# Patient Record
Sex: FEMALE | Race: WHITE | NOT HISPANIC OR LATINO | Employment: OTHER | URBAN - NONMETROPOLITAN AREA
[De-identification: names, ages, dates, MRNs, and addresses within clinical notes are randomized per-mention and may not be internally consistent; named-entity substitution may affect disease eponyms.]

---

## 2017-05-19 RX ORDER — WARFARIN SODIUM 5 MG/1
5 TABLET ORAL
COMMUNITY
End: 2019-02-07

## 2017-05-19 RX ORDER — DIGOXIN 125 MCG
125 TABLET ORAL
COMMUNITY

## 2017-05-19 RX ORDER — POTASSIUM CHLORIDE 1.5 G/1.77G
20 POWDER, FOR SOLUTION ORAL DAILY
COMMUNITY

## 2017-05-19 RX ORDER — METOPROLOL TARTRATE 50 MG/1
50 TABLET, FILM COATED ORAL 2 TIMES DAILY
COMMUNITY
End: 2017-06-26

## 2017-05-19 RX ORDER — FUROSEMIDE 40 MG/1
40 TABLET ORAL DAILY
COMMUNITY

## 2017-05-19 RX ORDER — ESCITALOPRAM OXALATE 10 MG/1
5 TABLET ORAL DAILY
COMMUNITY

## 2017-05-19 RX ORDER — SIMVASTATIN 20 MG
20 TABLET ORAL NIGHTLY
COMMUNITY
End: 2017-06-26

## 2017-05-19 RX ORDER — PREDNISONE 10 MG/1
10 TABLET ORAL DAILY
COMMUNITY
End: 2017-06-26

## 2017-05-19 RX ORDER — OMEPRAZOLE 40 MG/1
40 CAPSULE, DELAYED RELEASE ORAL DAILY
COMMUNITY
End: 2017-06-26

## 2017-05-19 RX ORDER — LEVOTHYROXINE SODIUM 0.05 MG/1
75 TABLET ORAL DAILY
COMMUNITY

## 2017-06-07 ENCOUNTER — HOSPITAL ENCOUNTER (EMERGENCY)
Facility: HOSPITAL | Age: 82
Discharge: HOME | End: 2017-06-07
Attending: EMERGENCY MEDICINE | Admitting: EMERGENCY MEDICINE

## 2017-06-07 ENCOUNTER — APPOINTMENT (OUTPATIENT)
Dept: GENERAL RADIOLOGY | Facility: HOSPITAL | Age: 82
End: 2017-06-07

## 2017-06-07 VITALS
HEART RATE: 71 BPM | WEIGHT: 130 LBS | RESPIRATION RATE: 22 BRPM | SYSTOLIC BLOOD PRESSURE: 136 MMHG | OXYGEN SATURATION: 96 % | TEMPERATURE: 97.4 F | DIASTOLIC BLOOD PRESSURE: 92 MMHG | BODY MASS INDEX: 22.2 KG/M2 | HEIGHT: 64 IN

## 2017-06-07 DIAGNOSIS — N17.9 ACUTE KIDNEY INJURY (NONTRAUMATIC) (HCC): ICD-10-CM

## 2017-06-07 DIAGNOSIS — R06.02 SHORTNESS OF BREATH: ICD-10-CM

## 2017-06-07 DIAGNOSIS — R42 DIZZINESS: ICD-10-CM

## 2017-06-07 DIAGNOSIS — E86.9 VOLUME DEPLETION: Primary | ICD-10-CM

## 2017-06-07 LAB
ALBUMIN SERPL-MCNC: 4.3 G/DL (ref 3.5–5)
ALBUMIN/GLOB SERPL: 1.3 G/DL (ref 1.1–2.5)
ALP SERPL-CCNC: 67 U/L (ref 24–120)
ALT SERPL W P-5'-P-CCNC: 25 U/L (ref 0–54)
ANION GAP SERPL CALCULATED.3IONS-SCNC: 12 MMOL/L (ref 4–13)
AST SERPL-CCNC: 40 U/L (ref 7–45)
BASOPHILS # BLD AUTO: 0.01 10*3/MM3 (ref 0–0.2)
BASOPHILS NFR BLD AUTO: 0.2 % (ref 0–2)
BILIRUB SERPL-MCNC: 0.7 MG/DL (ref 0.1–1)
BILIRUB UR QL STRIP: NEGATIVE
BUN BLD-MCNC: 21 MG/DL (ref 5–21)
BUN/CREAT SERPL: 14.2 (ref 7–25)
CALCIUM SPEC-SCNC: 10 MG/DL (ref 8.4–10.4)
CHLORIDE SERPL-SCNC: 96 MMOL/L (ref 98–110)
CLARITY UR: CLEAR
CO2 SERPL-SCNC: 27 MMOL/L (ref 24–31)
COLOR UR: YELLOW
CREAT BLD-MCNC: 1.48 MG/DL (ref 0.5–1.4)
D-LACTATE SERPL-SCNC: 1.5 MMOL/L (ref 0.5–2)
DEPRECATED RDW RBC AUTO: 46.2 FL (ref 40–54)
DIGOXIN SERPL-MCNC: 1.4 NG/ML (ref 0.8–2)
EOSINOPHIL # BLD AUTO: 0.01 10*3/MM3 (ref 0–0.7)
EOSINOPHIL NFR BLD AUTO: 0.2 % (ref 0–4)
ERYTHROCYTE [DISTWIDTH] IN BLOOD BY AUTOMATED COUNT: 14.3 % (ref 12–15)
GFR SERPL CREATININE-BSD FRML MDRD: 33 ML/MIN/1.73
GLOBULIN UR ELPH-MCNC: 3.2 GM/DL
GLUCOSE BLD-MCNC: 120 MG/DL (ref 70–100)
GLUCOSE UR STRIP-MCNC: NEGATIVE MG/DL
HCT VFR BLD AUTO: 45.5 % (ref 37–47)
HGB BLD-MCNC: 15.3 G/DL (ref 12–16)
HGB UR QL STRIP.AUTO: NEGATIVE
IMM GRANULOCYTES # BLD: 0.03 10*3/MM3 (ref 0–0.03)
IMM GRANULOCYTES NFR BLD: 0.5 % (ref 0–5)
INR PPP: 1.9 (ref 0.91–1.09)
KETONES UR QL STRIP: NEGATIVE
LEUKOCYTE ESTERASE UR QL STRIP.AUTO: NEGATIVE
LYMPHOCYTES # BLD AUTO: 0.86 10*3/MM3 (ref 0.72–4.86)
LYMPHOCYTES NFR BLD AUTO: 15.2 % (ref 15–45)
MCH RBC QN AUTO: 29.6 PG (ref 28–32)
MCHC RBC AUTO-ENTMCNC: 33.6 G/DL (ref 33–36)
MCV RBC AUTO: 88 FL (ref 82–98)
MONOCYTES # BLD AUTO: 0.88 10*3/MM3 (ref 0.19–1.3)
MONOCYTES NFR BLD AUTO: 15.6 % (ref 4–12)
NEUTROPHILS # BLD AUTO: 3.85 10*3/MM3 (ref 1.87–8.4)
NEUTROPHILS NFR BLD AUTO: 68.3 % (ref 39–78)
NITRITE UR QL STRIP: NEGATIVE
NT-PROBNP SERPL-MCNC: 2280 PG/ML (ref 0–1800)
PH UR STRIP.AUTO: <=5 [PH] (ref 5–8)
PLATELET # BLD AUTO: 174 10*3/MM3 (ref 130–400)
PMV BLD AUTO: 10.6 FL (ref 6–12)
POTASSIUM BLD-SCNC: 4.6 MMOL/L (ref 3.5–5.3)
PROT SERPL-MCNC: 7.5 G/DL (ref 6.3–8.7)
PROT UR QL STRIP: NEGATIVE
PROTHROMBIN TIME: 22.5 SECONDS (ref 11.9–14.6)
RBC # BLD AUTO: 5.17 10*6/MM3 (ref 4.2–5.4)
SODIUM BLD-SCNC: 135 MMOL/L (ref 135–145)
SP GR UR STRIP: 1.01 (ref 1–1.03)
TROPONIN I SERPL-MCNC: 0.03 NG/ML (ref 0–0.07)
UROBILINOGEN UR QL STRIP: NORMAL
WBC NRBC COR # BLD: 5.64 10*3/MM3 (ref 4.8–10.8)

## 2017-06-07 PROCEDURE — 80053 COMPREHEN METABOLIC PANEL: CPT | Performed by: EMERGENCY MEDICINE

## 2017-06-07 PROCEDURE — 93010 ELECTROCARDIOGRAM REPORT: CPT | Performed by: INTERNAL MEDICINE

## 2017-06-07 PROCEDURE — 83605 ASSAY OF LACTIC ACID: CPT | Performed by: EMERGENCY MEDICINE

## 2017-06-07 PROCEDURE — 84484 ASSAY OF TROPONIN QUANT: CPT

## 2017-06-07 PROCEDURE — 93005 ELECTROCARDIOGRAM TRACING: CPT

## 2017-06-07 PROCEDURE — 83880 ASSAY OF NATRIURETIC PEPTIDE: CPT | Performed by: EMERGENCY MEDICINE

## 2017-06-07 PROCEDURE — 85025 COMPLETE CBC W/AUTO DIFF WBC: CPT | Performed by: EMERGENCY MEDICINE

## 2017-06-07 PROCEDURE — 87040 BLOOD CULTURE FOR BACTERIA: CPT | Performed by: EMERGENCY MEDICINE

## 2017-06-07 PROCEDURE — 36415 COLL VENOUS BLD VENIPUNCTURE: CPT

## 2017-06-07 PROCEDURE — 71010 HC CHEST PA OR AP: CPT

## 2017-06-07 PROCEDURE — 99284 EMERGENCY DEPT VISIT MOD MDM: CPT

## 2017-06-07 PROCEDURE — 85610 PROTHROMBIN TIME: CPT | Performed by: EMERGENCY MEDICINE

## 2017-06-07 PROCEDURE — 80162 ASSAY OF DIGOXIN TOTAL: CPT | Performed by: EMERGENCY MEDICINE

## 2017-06-07 PROCEDURE — 81003 URINALYSIS AUTO W/O SCOPE: CPT | Performed by: EMERGENCY MEDICINE

## 2017-06-07 RX ORDER — METHYLPREDNISOLONE 4 MG/1
TABLET ORAL
Qty: 21 TABLET | Refills: 0 | Status: SHIPPED | OUTPATIENT
Start: 2017-06-07 | End: 2017-06-26

## 2017-06-07 RX ORDER — ALBUTEROL SULFATE 90 UG/1
2 AEROSOL, METERED RESPIRATORY (INHALATION) EVERY 4 HOURS PRN
Qty: 1 INHALER | Refills: 0 | Status: SHIPPED | OUTPATIENT
Start: 2017-06-07 | End: 2017-06-26

## 2017-06-07 RX ORDER — AZITHROMYCIN 250 MG/1
250 TABLET, FILM COATED ORAL DAILY
Qty: 6 TABLET | Refills: 0 | Status: SHIPPED | OUTPATIENT
Start: 2017-06-07 | End: 2017-06-26 | Stop reason: ALTCHOICE

## 2017-06-07 NOTE — ED PROVIDER NOTES
"Subjective   HPI Comments: Patient complains of \"fatigue\" since yesterday.  She seems to be getting worse with a generalized weakness and some shortness of breath.  She just generally feels tired and no energy and weak.  She also has had some dizziness whenever she moves around very much and family wanted to get her checked out.    Patient is a 86 y.o. female presenting with weakness.   History provided by:  Patient and relative   used: No    Weakness - Generalized   Severity:  Moderate  Onset quality:  Gradual  Duration:  2 days  Timing:  Constant  Progression:  Worsening  Chronicity:  New  Context: not alcohol use, not allergies, not change in medication, not decreased sleep, not dehydration, not drug use, not increased activity, not pinched nerve, not recent infection, not stress and not urinary tract infection    Relieved by:  Nothing  Worsened by:  Nothing  Ineffective treatments:  None tried  Associated symptoms: difficulty walking, dizziness and shortness of breath    Associated symptoms: no abdominal pain, no anorexia, no aphasia, no arthralgias, no ataxia, no chest pain, no cough, no diarrhea, no drooling, no dysphagia, no dysuria, no numbness in extremities, no falls, no fever, no foul-smelling urine, no frequency, no headaches, no hematochezia, no lethargy, no loss of consciousness, no melena, no myalgias, no nausea, no near-syncope, no seizures, no sensory-motor deficit, no stroke symptoms, no syncope, no urgency, no vision change and no vomiting    Risk factors: congestive heart failure    Risk factors: no anemia, no coronary artery disease, no diabetes, no excessive menstruation, no family hx of stroke, no heart disease, no neurologic disease, no new medications and no recent stressors        Review of Systems   Constitutional: Negative.  Negative for fever.   HENT: Negative.  Negative for drooling.    Respiratory: Positive for shortness of breath. Negative for cough.  "   Cardiovascular: Negative.  Negative for chest pain, syncope and near-syncope.   Gastrointestinal: Negative.  Negative for abdominal pain, anorexia, diarrhea, dysphagia, hematochezia, melena, nausea and vomiting.   Genitourinary: Negative.  Negative for dysuria, frequency and urgency.   Musculoskeletal: Negative.  Negative for arthralgias, falls and myalgias.   Neurological: Positive for dizziness. Negative for seizures, loss of consciousness and headaches.   Hematological: Negative.    Psychiatric/Behavioral: Negative.    All other systems reviewed and are negative.      Past Medical History:   Diagnosis Date   • Atherosclerosis of native coronary artery of native heart without angina pectoris    • Atrial fibrillation by electrocardiogram     neg stress 4/10   • Atrial fibrillation by electrocardiogram    • Fatigue    • Hyperlipidemia, mixed    • Hypertension, benign    • Mitral valve disorder     moderate MR per 8/12 echo - worsening c/w 2010 - prob component of her dyspnea   • Sick sinus syndrome    • Status post placement of cardiac pacemaker        No Known Allergies    Past Surgical History:   Procedure Laterality Date   • CORONARY ANGIOPLASTY  09/2009    stent LAD       Family History   Problem Relation Age of Onset   • Coronary artery disease Mother        Social History     Social History   • Marital status:      Spouse name: N/A   • Number of children: N/A   • Years of education: N/A     Social History Main Topics   • Smoking status: Former Smoker     Quit date: 1992   • Smokeless tobacco: None   • Alcohol use No   • Drug use: None   • Sexual activity: Not Asked     Other Topics Concern   • None     Social History Narrative       Prior to Admission medications    Medication Sig Start Date End Date Taking? Authorizing Provider   Calcium Carbonate (CALCIUM 600 PO) Take  by mouth.    Historical Provider, MD   digoxin (LANOXIN) 125 MCG tablet Take 125 mcg by mouth Daily.    Historical Provider, MD    escitalopram (LEXAPRO) 10 MG tablet Take 10 mg by mouth Daily.    Historical Provider, MD   furosemide (LASIX) 40 MG tablet Take 40 mg by mouth 2 (Two) Times a Day.    Historical Provider, MD   levothyroxine (SYNTHROID, LEVOTHROID) 50 MCG tablet Take 50 mcg by mouth Daily.    Historical Provider, MD   metoprolol tartrate (LOPRESSOR) 50 MG tablet Take 50 mg by mouth 2 (Two) Times a Day.    Historical Provider, MD   omeprazole (priLOSEC) 40 MG capsule Take 40 mg by mouth Daily.    Historical Provider, MD   potassium chloride (KLOR-CON) 20 MEQ packet Take 20 mEq by mouth 2 (Two) Times a Day.    Historical Provider, MD   predniSONE (DELTASONE) 10 MG tablet Take 10 mg by mouth Daily.    Historical Provider, MD   simvastatin (ZOCOR) 20 MG tablet Take 20 mg by mouth Every Night.    Historical Provider, MD   warfarin (COUMADIN) 5 MG tablet Take 5 mg by mouth Daily.    Historical Provider, MD       Medications - No data to display    Vitals:    06/07/17 2120   BP: 136/92   Pulse: 71   Resp: 22   Temp:    SpO2: 96%         Objective   Physical Exam   Constitutional: She is oriented to person, place, and time. She appears well-developed and well-nourished.   HENT:   Head: Normocephalic and atraumatic.   Mouth/Throat: Oropharynx is clear and moist.   Eyes: EOM are normal. Pupils are equal, round, and reactive to light.   Neck: Normal range of motion. Neck supple.   Cardiovascular: Normal rate and regular rhythm.    Pulmonary/Chest: She has no wheezes.   Patient seems mildly tachypneic but nonlabored.   Abdominal: Soft. Bowel sounds are normal.   Musculoskeletal: Normal range of motion. She exhibits edema.   Patient has 1+ edema in her lower legs.   Neurological: She is alert and oriented to person, place, and time.   Skin: Skin is warm and dry.   Psychiatric: She has a normal mood and affect. Her behavior is normal.   Nursing note and vitals reviewed.      Procedures         Lab Results (last 24 hours)     Procedure Component  Value Units Date/Time    CBC & Differential [[id number]] Collected:  06/07/17 1811    Specimen:  Blood Updated:  06/07/17 1823    Narrative:       The following orders were created for panel order CBC & Differential.  Procedure                               Abnormality         Status                     ---------                               -----------         ------                     CBC Auto Differential[[id number]]        Abnormal            Final result                 Please view results for these tests on the individual orders.    Protime-INR [[id number]]  (Abnormal) Collected:  06/07/17 1811    Specimen:  Blood Updated:  06/07/17 1839     Protime 22.5 (H) Seconds      INR 1.90 (H)    BNP [[id number]]  (Abnormal) Collected:  06/07/17 1811    Specimen:  Blood Updated:  06/07/17 1840     proBNP 2280.0 (H) pg/mL     Digoxin Level [[id number]]  (Normal) Collected:  06/07/17 1811    Specimen:  Blood Updated:  06/07/17 1834     Digoxin 1.40 ng/mL     CBC Auto Differential [[id number]]  (Abnormal) Collected:  06/07/17 1811    Specimen:  Blood Updated:  06/07/17 1823     WBC 5.64 10*3/mm3      RBC 5.17 10*6/mm3      Hemoglobin 15.3 g/dL      Hematocrit 45.5 %      MCV 88.0 fL      MCH 29.6 pg      MCHC 33.6 g/dL      RDW 14.3 %      RDW-SD 46.2 fl      MPV 10.6 fL      Platelets 174 10*3/mm3      Neutrophil % 68.3 %      Lymphocyte % 15.2 %      Monocyte % 15.6 (H) %      Eosinophil % 0.2 %      Basophil % 0.2 %      Immature Grans % 0.5 %      Neutrophils, Absolute 3.85 10*3/mm3      Lymphocytes, Absolute 0.86 10*3/mm3      Monocytes, Absolute 0.88 10*3/mm3      Eosinophils, Absolute 0.01 10*3/mm3      Basophils, Absolute 0.01 10*3/mm3      Immature Grans, Absolute 0.03 10*3/mm3     Blood Culture [[id number]] Collected:  06/07/17 1815    Specimen:  Blood from Arm, Left Updated:  06/07/17 1834    Lactic Acid, Plasma [[id number]]  (Normal) Collected:  06/07/17 1815    Specimen:  Blood from Arm, Left Updated:  06/07/17 1846      Lactate 1.5 mmol/L     Blood Culture [[id number]] Collected:  06/07/17 1820    Specimen:  Blood from Arm, Left Updated:  06/07/17 1833    POC Troponin, Rapid [[id number]]  (Normal) Collected:  06/07/17 1823    Specimen:  Blood Updated:  06/07/17 1835     Troponin I 0.03 ng/mL       Serial Number: [id number]    : [id number]       Comprehensive Metabolic Panel [[id number]]  (Abnormal) Collected:  06/07/17 1952    Specimen:  Blood from Arm, Left Updated:  06/07/17 2010     Glucose 120 (H) mg/dL      BUN 21 mg/dL      Creatinine 1.48 (H) mg/dL      Sodium 135 mmol/L      Potassium 4.6 mmol/L      Chloride 96 (L) mmol/L      CO2 27.0 mmol/L      Calcium 10.0 mg/dL      Total Protein 7.5 g/dL      Albumin 4.30 g/dL      ALT (SGPT) 25 U/L      AST (SGOT) 40 U/L      Alkaline Phosphatase 67 U/L      Total Bilirubin 0.7 mg/dL      eGFR Non African Amer 33 (L) mL/min/1.73      Globulin 3.2 gm/dL      A/G Ratio 1.3 g/dL      BUN/Creatinine Ratio 14.2     Anion Gap 12.0 mmol/L     Narrative:       The MDRD GFR formula is only valid for adults with stable renal function between ages 18 and 70.    Urinalysis With / Culture If Indicated [[id number]]  (Normal) Collected:  06/07/17 2034    Specimen:  Urine from Urine, Catheter Updated:  06/07/17 2043     Color, UA Yellow     Appearance, UA Clear     pH, UA <=5.0     Specific Gravity, UA 1.011     Glucose, UA Negative     Ketones, UA Negative     Bilirubin, UA Negative     Blood, UA Negative     Protein, UA Negative     Leuk Esterase, UA Negative     Nitrite, UA Negative     Urobilinogen, UA 0.2 E.U./dL    Narrative:       Urine microscopic not indicated.          XR Chest 1 View   Final Result   1. Large hiatal hernia.   2. No acute disease.   This report was finalized on 06/07/2017 18:35 by Dr. Joby Maria MD.          ED Course  ED Course   Comment By Time   Left with Dr. Cody at shift change. Ike Barron Jr., MD 06/07 1814   Discussed all findings with patient and  patient's family.  Patient will be discharged home she is in improved condition at this time denies dizziness at this time.  I discussed with the family and the patient the importance of keeping up fluid intake given the fact that her creatinine has increased by an appreciable percentage.  Finally I have prescribed steroids Zithromax and albuterol inhaler for his likely an early bronchitis.  Patient will follow-up with her regular doctor tomorrow and return here if symptoms worsen or change. Christina Cody DO 06/07 5751          [facility]    Final diagnoses:   Volume depletion   Acute kidney injury (nontraumatic)   Dizziness   Shortness of breath          Ike Barron Jr., MD  06/08/17 1443

## 2017-06-08 NOTE — DISCHARGE INSTRUCTIONS

## 2017-06-09 ENCOUNTER — TELEPHONE (OUTPATIENT)
Dept: INTERNAL MEDICINE | Age: 82
End: 2017-06-09

## 2017-06-12 ENCOUNTER — OFFICE VISIT (OUTPATIENT)
Dept: INTERNAL MEDICINE | Age: 82
End: 2017-06-12
Payer: MEDICARE

## 2017-06-12 VITALS
DIASTOLIC BLOOD PRESSURE: 64 MMHG | SYSTOLIC BLOOD PRESSURE: 100 MMHG | BODY MASS INDEX: 19.63 KG/M2 | WEIGHT: 115 LBS | OXYGEN SATURATION: 97 % | HEIGHT: 64 IN | HEART RATE: 70 BPM

## 2017-06-12 DIAGNOSIS — I34.0 MITRAL VALVE INSUFFICIENCY, UNSPECIFIED ETIOLOGY: ICD-10-CM

## 2017-06-12 DIAGNOSIS — R53.1 WEAKNESS GENERALIZED: ICD-10-CM

## 2017-06-12 DIAGNOSIS — I50.22 SYSTOLIC CHF, CHRONIC (HCC): ICD-10-CM

## 2017-06-12 DIAGNOSIS — I10 ESSENTIAL HYPERTENSION: ICD-10-CM

## 2017-06-12 DIAGNOSIS — E86.9 VOLUME DEPLETION: Primary | ICD-10-CM

## 2017-06-12 DIAGNOSIS — N28.9 ACUTE RENAL INSUFFICIENCY: ICD-10-CM

## 2017-06-12 DIAGNOSIS — I48.0 PAROXYSMAL ATRIAL FIBRILLATION (HCC): ICD-10-CM

## 2017-06-12 LAB
BACTERIA SPEC AEROBE CULT: NORMAL
BACTERIA SPEC AEROBE CULT: NORMAL

## 2017-06-12 PROCEDURE — G8427 DOCREV CUR MEDS BY ELIG CLIN: HCPCS | Performed by: INTERNAL MEDICINE

## 2017-06-12 PROCEDURE — G8598 ASA/ANTIPLAT THER USED: HCPCS | Performed by: INTERNAL MEDICINE

## 2017-06-12 PROCEDURE — 1036F TOBACCO NON-USER: CPT | Performed by: INTERNAL MEDICINE

## 2017-06-12 PROCEDURE — 4040F PNEUMOC VAC/ADMIN/RCVD: CPT | Performed by: INTERNAL MEDICINE

## 2017-06-12 PROCEDURE — 99214 OFFICE O/P EST MOD 30 MIN: CPT | Performed by: INTERNAL MEDICINE

## 2017-06-12 PROCEDURE — 1090F PRES/ABSN URINE INCON ASSESS: CPT | Performed by: INTERNAL MEDICINE

## 2017-06-12 PROCEDURE — 1123F ACP DISCUSS/DSCN MKR DOCD: CPT | Performed by: INTERNAL MEDICINE

## 2017-06-12 PROCEDURE — G8420 CALC BMI NORM PARAMETERS: HCPCS | Performed by: INTERNAL MEDICINE

## 2017-06-12 RX ORDER — ESCITALOPRAM OXALATE 5 MG/1
5 TABLET ORAL DAILY
COMMUNITY
End: 2018-06-28 | Stop reason: SDUPTHER

## 2017-06-12 RX ORDER — OMEPRAZOLE 20 MG/1
40 CAPSULE, DELAYED RELEASE ORAL DAILY
COMMUNITY
End: 2017-11-09 | Stop reason: SDUPTHER

## 2017-06-12 RX ORDER — CALCIUM CARBONATE 500(1250)
500 TABLET ORAL 2 TIMES DAILY
COMMUNITY

## 2017-06-12 RX ORDER — DIGOXIN 125 MCG
125 TABLET ORAL EVERY OTHER DAY
COMMUNITY

## 2017-06-12 RX ORDER — LEVOTHYROXINE SODIUM 0.07 MG/1
75 TABLET ORAL DAILY
COMMUNITY
End: 2018-05-29 | Stop reason: SDUPTHER

## 2017-06-12 RX ORDER — SIMVASTATIN 10 MG
10 TABLET ORAL NIGHTLY
COMMUNITY
End: 2017-08-04 | Stop reason: SDUPTHER

## 2017-06-12 RX ORDER — CARVEDILOL 12.5 MG/1
12.5 TABLET ORAL 2 TIMES DAILY WITH MEALS
COMMUNITY
End: 2018-04-19 | Stop reason: SDUPTHER

## 2017-06-12 ASSESSMENT — ENCOUNTER SYMPTOMS
DIARRHEA: 0
COUGH: 0
SHORTNESS OF BREATH: 0
ABDOMINAL PAIN: 0
EYE REDNESS: 0
WHEEZING: 0
SINUS PRESSURE: 0
CHEST TIGHTNESS: 0
CONSTIPATION: 0
RHINORRHEA: 0
VOMITING: 0
NAUSEA: 0
SORE THROAT: 0

## 2017-06-13 ENCOUNTER — TELEPHONE (OUTPATIENT)
Dept: INTERNAL MEDICINE | Age: 82
End: 2017-06-13

## 2017-06-14 ENCOUNTER — TELEPHONE (OUTPATIENT)
Dept: INTERNAL MEDICINE | Age: 82
End: 2017-06-14

## 2017-06-15 ENCOUNTER — LAB REQUISITION (OUTPATIENT)
Dept: LAB | Facility: HOSPITAL | Age: 82
End: 2017-06-15

## 2017-06-15 DIAGNOSIS — Z79.01 LONG TERM (CURRENT) USE OF ANTICOAGULANTS: ICD-10-CM

## 2017-06-15 DIAGNOSIS — Z00.00 ENCOUNTER FOR GENERAL ADULT MEDICAL EXAMINATION WITHOUT ABNORMAL FINDINGS: ICD-10-CM

## 2017-06-15 DIAGNOSIS — I48.91 ATRIAL FIBRILLATION, UNSPECIFIED TYPE (HCC): Primary | ICD-10-CM

## 2017-06-15 LAB
INR PPP: 1.5 (ref 0.8–1.2)
PROTHROMBIN TIME: 17.8 SECONDS (ref 12.8–15.2)

## 2017-06-15 PROCEDURE — 85610 PROTHROMBIN TIME: CPT | Performed by: INTERNAL MEDICINE

## 2017-06-19 ENCOUNTER — TELEPHONE (OUTPATIENT)
Dept: INTERNAL MEDICINE | Age: 82
End: 2017-06-19

## 2017-06-19 RX ORDER — POTASSIUM CHLORIDE 20 MEQ/1
20 TABLET, EXTENDED RELEASE ORAL DAILY
Qty: 30 TABLET | Refills: 5 | Status: SHIPPED | OUTPATIENT
Start: 2017-06-19 | End: 2017-06-21 | Stop reason: SDUPTHER

## 2017-06-19 RX ORDER — FUROSEMIDE 40 MG/1
40 TABLET ORAL DAILY
COMMUNITY
End: 2017-08-07 | Stop reason: SDUPTHER

## 2017-06-19 RX ORDER — POTASSIUM CHLORIDE 20 MEQ/1
20 TABLET, EXTENDED RELEASE ORAL DAILY
COMMUNITY
End: 2017-06-19 | Stop reason: SDUPTHER

## 2017-06-20 ENCOUNTER — TELEPHONE (OUTPATIENT)
Dept: INTERNAL MEDICINE | Age: 82
End: 2017-06-20

## 2017-06-21 RX ORDER — POTASSIUM CHLORIDE 20 MEQ/1
20 TABLET, EXTENDED RELEASE ORAL DAILY
Qty: 30 TABLET | Refills: 5 | Status: SHIPPED | OUTPATIENT
Start: 2017-06-21 | End: 2019-01-11 | Stop reason: SDUPTHER

## 2017-06-22 ENCOUNTER — LAB REQUISITION (OUTPATIENT)
Dept: LAB | Facility: HOSPITAL | Age: 82
End: 2017-06-22

## 2017-06-22 DIAGNOSIS — Z00.00 ENCOUNTER FOR GENERAL ADULT MEDICAL EXAMINATION WITHOUT ABNORMAL FINDINGS: ICD-10-CM

## 2017-06-22 DIAGNOSIS — I48.91 ATRIAL FIBRILLATION, UNSPECIFIED TYPE (HCC): ICD-10-CM

## 2017-06-22 DIAGNOSIS — Z79.01 LONG TERM (CURRENT) USE OF ANTICOAGULANTS: Primary | ICD-10-CM

## 2017-06-22 LAB
ALBUMIN SERPL-MCNC: 3.4 G/DL (ref 3.5–5)
ALBUMIN/GLOB SERPL: 1.3 G/DL (ref 1.1–2.5)
ALP SERPL-CCNC: 59 U/L (ref 24–120)
ALT SERPL W P-5'-P-CCNC: 34 U/L (ref 0–54)
ANION GAP SERPL CALCULATED.3IONS-SCNC: 9 MMOL/L (ref 4–13)
AST SERPL-CCNC: 25 U/L (ref 7–45)
BASOPHILS # BLD AUTO: 0.02 10*3/MM3 (ref 0–0.2)
BASOPHILS NFR BLD AUTO: 0.2 % (ref 0–2)
BILIRUB SERPL-MCNC: 0.6 MG/DL (ref 0.1–1)
BUN BLD-MCNC: 17 MG/DL (ref 5–21)
BUN/CREAT SERPL: 13.7 (ref 7–25)
CALCIUM SPEC-SCNC: 9.1 MG/DL (ref 8.4–10.4)
CHLORIDE SERPL-SCNC: 105 MMOL/L (ref 98–110)
CO2 SERPL-SCNC: 27 MMOL/L (ref 24–31)
CREAT BLD-MCNC: 1.24 MG/DL (ref 0.5–1.4)
DEPRECATED RDW RBC AUTO: 48.2 FL (ref 40–54)
DIGOXIN SERPL-MCNC: 1.2 NG/ML (ref 0.8–2)
EOSINOPHIL # BLD AUTO: 0.1 10*3/MM3 (ref 0–0.7)
EOSINOPHIL NFR BLD AUTO: 1.2 % (ref 0–4)
ERYTHROCYTE [DISTWIDTH] IN BLOOD BY AUTOMATED COUNT: 14.8 % (ref 12–15)
GFR SERPL CREATININE-BSD FRML MDRD: 41 ML/MIN/1.73
GLOBULIN UR ELPH-MCNC: 2.7 GM/DL
GLUCOSE BLD-MCNC: 123 MG/DL (ref 70–100)
HCT VFR BLD AUTO: 36.9 % (ref 37–47)
HGB BLD-MCNC: 11.9 G/DL (ref 12–16)
IMM GRANULOCYTES # BLD: 0.02 10*3/MM3 (ref 0–0.03)
IMM GRANULOCYTES NFR BLD: 0.2 % (ref 0–5)
LYMPHOCYTES # BLD AUTO: 1.57 10*3/MM3 (ref 0.72–4.86)
LYMPHOCYTES NFR BLD AUTO: 19.1 % (ref 15–45)
MCH RBC QN AUTO: 29.1 PG (ref 28–32)
MCHC RBC AUTO-ENTMCNC: 32.2 G/DL (ref 33–36)
MCV RBC AUTO: 90.2 FL (ref 82–98)
MONOCYTES # BLD AUTO: 0.74 10*3/MM3 (ref 0.19–1.3)
MONOCYTES NFR BLD AUTO: 9 % (ref 4–12)
NEUTROPHILS # BLD AUTO: 5.79 10*3/MM3 (ref 1.87–8.4)
NEUTROPHILS NFR BLD AUTO: 70.3 % (ref 39–78)
PLATELET # BLD AUTO: 242 10*3/MM3 (ref 130–400)
PMV BLD AUTO: 10.1 FL (ref 6–12)
POTASSIUM BLD-SCNC: 3.8 MMOL/L (ref 3.5–5.3)
PROT SERPL-MCNC: 6.1 G/DL (ref 6.3–8.7)
RBC # BLD AUTO: 4.09 10*6/MM3 (ref 4.2–5.4)
SODIUM BLD-SCNC: 141 MMOL/L (ref 135–145)
WBC NRBC COR # BLD: 8.24 10*3/MM3 (ref 4.8–10.8)

## 2017-06-22 PROCEDURE — 85025 COMPLETE CBC W/AUTO DIFF WBC: CPT | Performed by: INTERNAL MEDICINE

## 2017-06-22 PROCEDURE — 80162 ASSAY OF DIGOXIN TOTAL: CPT | Performed by: INTERNAL MEDICINE

## 2017-06-22 PROCEDURE — 80053 COMPREHEN METABOLIC PANEL: CPT | Performed by: INTERNAL MEDICINE

## 2017-06-26 ENCOUNTER — CLINICAL SUPPORT (OUTPATIENT)
Dept: CARDIOLOGY | Facility: CLINIC | Age: 82
End: 2017-06-26

## 2017-06-26 ENCOUNTER — OFFICE VISIT (OUTPATIENT)
Dept: CARDIOLOGY | Facility: CLINIC | Age: 82
End: 2017-06-26

## 2017-06-26 VITALS
OXYGEN SATURATION: 99 % | DIASTOLIC BLOOD PRESSURE: 80 MMHG | WEIGHT: 127 LBS | SYSTOLIC BLOOD PRESSURE: 122 MMHG | HEART RATE: 73 BPM | BODY MASS INDEX: 23.37 KG/M2 | HEIGHT: 62 IN

## 2017-06-26 DIAGNOSIS — I48.19 PERSISTENT ATRIAL FIBRILLATION (HCC): ICD-10-CM

## 2017-06-26 DIAGNOSIS — I49.5 SICK SINUS SYNDROME (HCC): ICD-10-CM

## 2017-06-26 DIAGNOSIS — I48.19 PERSISTENT ATRIAL FIBRILLATION (HCC): Primary | ICD-10-CM

## 2017-06-26 DIAGNOSIS — I51.9 SYSTOLIC DYSFUNCTION, LEFT VENTRICLE: ICD-10-CM

## 2017-06-26 DIAGNOSIS — Z95.0 PACEMAKER: ICD-10-CM

## 2017-06-26 DIAGNOSIS — E78.2 MIXED HYPERLIPIDEMIA: ICD-10-CM

## 2017-06-26 DIAGNOSIS — Z95.0 CARDIAC PACEMAKER IN SITU: Primary | ICD-10-CM

## 2017-06-26 DIAGNOSIS — I34.0 MITRAL VALVE INSUFFICIENCY, UNSPECIFIED ETIOLOGY: ICD-10-CM

## 2017-06-26 PROBLEM — E03.9 HYPOTHYROID: Status: ACTIVE | Noted: 2017-06-26

## 2017-06-26 PROCEDURE — 93288 INTERROG EVL PM/LDLS PM IP: CPT | Performed by: INTERNAL MEDICINE

## 2017-06-26 PROCEDURE — 93000 ELECTROCARDIOGRAM COMPLETE: CPT | Performed by: INTERNAL MEDICINE

## 2017-06-26 PROCEDURE — 99204 OFFICE O/P NEW MOD 45 MIN: CPT | Performed by: INTERNAL MEDICINE

## 2017-06-26 RX ORDER — ASPIRIN 81 MG/1
81 TABLET ORAL DAILY
COMMUNITY

## 2017-06-26 RX ORDER — METOPROLOL SUCCINATE 100 MG/1
100 TABLET, EXTENDED RELEASE ORAL DAILY
Qty: 90 TABLET | Refills: 3 | Status: SHIPPED | OUTPATIENT
Start: 2017-06-26 | End: 2018-08-02 | Stop reason: SDUPTHER

## 2017-06-26 RX ORDER — METOPROLOL SUCCINATE 100 MG/1
100 TABLET, EXTENDED RELEASE ORAL DAILY
Qty: 90 TABLET | Refills: 3 | Status: SHIPPED | OUTPATIENT
Start: 2017-06-26 | End: 2017-06-26 | Stop reason: SDUPTHER

## 2017-06-26 RX ORDER — SIMVASTATIN 10 MG
10 TABLET ORAL NIGHTLY
COMMUNITY

## 2017-06-26 RX ORDER — OMEPRAZOLE 40 MG/1
40 CAPSULE, DELAYED RELEASE ORAL DAILY
COMMUNITY

## 2017-06-26 RX ORDER — CARVEDILOL 12.5 MG/1
12.5 TABLET ORAL 2 TIMES DAILY WITH MEALS
COMMUNITY
End: 2018-10-15 | Stop reason: ALTCHOICE

## 2017-06-26 NOTE — PROGRESS NOTES
I have reviewed the notes, assessments, and/or procedures performed by Daly Gaitan, I concur with her/his documentation of Tala Bethea.

## 2017-06-26 NOTE — PROGRESS NOTES
Dual Chamber Pacemaker Evaluation Report  In office    June 26, 2017    Primary Cardiologist: Benita  : Medtronic Model: EnRhythm  Implant date: May 3, 2010    Reason for evaluation: routine  Indication for pacemaker: sick sinus syndrome and a-fib    Measurements  Atrial sensing - P wave: 2.7 mV  Atrial threshold: n/r  Atrial lead impedance: 336 ohms  Ventricular sensing - R wave: 8.8 mV  Ventricular threshold: 1 V @ 0.4 ms  Ventricular lead impedance:   448 ohms     Diagnostic Data  Atrial paced: 25.2 %  Ventricular paced: 99.5 %  Other: 74.4% a-fib burden- longest 8 months, no ventricular high rates noted   meds include coumadin  Battery status: intensify follow up   2.83V      Final Parameters  Mode:  DDDR  Lower rate: 70 bpm   Upper rate: 130 bpm  AV Delay: paced- 180 ms  Sensed-150 ms  Atrial - Amplitude: 2 V   Pulse width: 0.4 ms   Sensitivity: 0.3 mV     Ventricular - Amplitude: 2 V  Pulse width: 0.4 ms  Sensitivity: 0.9 mV    Changes made: none  Conclusions: normal pacemaker function and stable pacing and sensing thresholds    Follow up: 1.5 months

## 2017-06-26 NOTE — PROGRESS NOTES
Reason for Visit: cardiovascular follow up.    HPI:  Tala Bethea is a 86 y.o. female is being seen for consultation today at the request of Dr Gaston.  She is a former patient of Dr. Mauro but has not been seen since 2013.  For the past two weeks her daughter notes she has not been doing well.  According to her daughter she has been sleeping a lot, tired, short of breath, and dizzy.  She has home health that comes intermittently.  When asked directly she initially denies these claims but then confirms them.  She is more dizzy in the morning.      Previous Cardiac Testing and Procedures:  - Lexiscan SPECT (05/26/2011) negative nuclear stress, EF 60%  - Echo (03/25/2015) mild LV dilatation, mild LVH, EF 40-45%, abnormal diastolic dysfunction, moderate left and right atrial dilatation, mild RV dilatation with normal systolic function, moderate to severe mitral regurgitation, moderate tricuspid regurgitation, RVSP 60-65 mmHg    Patient Active Problem List   Diagnosis   • Persistent atrial fibrillation   • Mixed hyperlipidemia   • Mitral valve insufficiency   • Pacemaker   • Sick sinus syndrome   • Hypothyroid       Social History   Substance Use Topics   • Smoking status: Former Smoker     Quit date: 1992   • Smokeless tobacco: Never Used   • Alcohol use No       Family History   Problem Relation Age of Onset   • Coronary artery disease Mother        The following portions of the patient's history were reviewed and updated as appropriate: allergies, current medications, past family history, past medical history, past social history, past surgical history and problem list.      Current Outpatient Prescriptions:   •  Calcium Carbonate (CALCIUM 600 PO), Take  by mouth., Disp: , Rfl:   •  digoxin (LANOXIN) 125 MCG tablet, Take 125 mcg by mouth Daily., Disp: , Rfl:   •  escitalopram (LEXAPRO) 10 MG tablet, Take 10 mg by mouth Daily., Disp: , Rfl:   •  furosemide (LASIX) 40 MG tablet, Take 40 mg by mouth 2 (Two) Times a  "Day., Disp: , Rfl:   •  levothyroxine (SYNTHROID, LEVOTHROID) 50 MCG tablet, Take 50 mcg by mouth Daily., Disp: , Rfl:   •  metoprolol tartrate (LOPRESSOR) 50 MG tablet, Take 50 mg by mouth 2 (Two) Times a Day., Disp: , Rfl:   •  potassium chloride (KLOR-CON) 20 MEQ packet, Take 20 mEq by mouth 2 (Two) Times a Day., Disp: , Rfl:   •  warfarin (COUMADIN) 5 MG tablet, Take 5 mg by mouth Daily., Disp: , Rfl:     Review of Systems   Constitution: Positive for weakness and malaise/fatigue. Negative for chills, fever and weight loss.   HENT: Negative for headaches and sore throat.    Eyes: Negative for blurred vision and visual disturbance.   Cardiovascular: Positive for dyspnea on exertion. Negative for chest pain, leg swelling, palpitations, paroxysmal nocturnal dyspnea and syncope.   Respiratory: Positive for shortness of breath. Negative for cough.    Endocrine: Negative for cold intolerance and polyuria.   Skin: Negative for itching and rash.   Musculoskeletal: Negative for joint swelling and myalgias.   Gastrointestinal: Negative for abdominal pain, diarrhea, heartburn and vomiting.   Genitourinary: Negative for dysuria and hematuria.   Neurological: Positive for dizziness. Negative for numbness.   Psychiatric/Behavioral: Negative for depression. The patient is not nervous/anxious.    Allergic/Immunologic: Negative for hives.       Objective   /80 (BP Location: Left arm, Patient Position: Sitting, Cuff Size: Adult)  Pulse 73  Ht 62\" (157.5 cm)  Wt 127 lb (57.6 kg)  SpO2 99%  BMI 23.23 kg/m2  Physical Exam   Constitutional: She is oriented to person, place, and time. She appears well-developed and well-nourished.   HENT:   Head: Normocephalic and atraumatic.   Eyes: Conjunctivae and EOM are normal. Pupils are equal, round, and reactive to light.   Neck: Normal range of motion. Neck supple. No JVD present. No thyromegaly present.   Cardiovascular: Normal rate, regular rhythm and normal heart sounds.    No " murmur heard.  Pulmonary/Chest: Effort normal and breath sounds normal. She has no wheezes. She has no rales.   Abdominal: Soft. Bowel sounds are normal. She exhibits no distension. There is no tenderness.   Musculoskeletal: Normal range of motion. She exhibits no edema.   Neurological: She is alert and oriented to person, place, and time. Coordination normal.   Skin: Skin is warm and dry. No rash noted.   Psychiatric: She has a normal mood and affect. Her behavior is normal.       ECG 12 Lead  Date/Time: 2017 1:57 PM  Performed by: LALO BABB  Authorized by: LALO BABB   Comparison: compared with previous ECG from 9/3/2013  Similar to previous ECG  Rhythm: paced  Pacin% capture                ICD-10-CM ICD-9-CM   1. Persistent atrial fibrillation I48.1 427.31   2. Sick sinus syndrome I49.5 427.81   3. Pacemaker Z95.0 V45.01   4. Mitral valve insufficiency, unspecified etiology I34.0 424.0   5. Mixed hyperlipidemia E78.2 272.2         Assessment/Plan:  1.  Persistent atrial fibrillation: Noted to be in atrial fibrillation approximately 70% of the time on pacemaker interrogation today.  Rate controlled with metoprolol and digoxin, on anticoagulation with warfarin.    2.  Sick sinus syndrome: Pacemaker in place.    3.  Pacemaker: Device nearing BRITTNEY.  Begin close monitoring.    4.  Mitral regurgitation: Moderate to severe based on last echocardiogram from .  Will repeat echo.    5.  LV systolic dysfunction: EF 40-45% on last echo from .  Will change to metoprolol succinate.  We will consider starting low-dose lisinopril in the future depending on repeat echo; however, will avoid currently given her dizziness.    6.  Mixed hyperlipidemia: Previously was managed on simvastatin.  Recently discontinued by Dr Gaston due to well-controlled lipid panel.

## 2017-06-27 ENCOUNTER — TELEPHONE (OUTPATIENT)
Dept: INTERNAL MEDICINE | Age: 82
End: 2017-06-27

## 2017-06-27 ENCOUNTER — LAB REQUISITION (OUTPATIENT)
Dept: LAB | Facility: HOSPITAL | Age: 82
End: 2017-06-27

## 2017-06-27 DIAGNOSIS — Z00.00 ENCOUNTER FOR GENERAL ADULT MEDICAL EXAMINATION WITHOUT ABNORMAL FINDINGS: ICD-10-CM

## 2017-06-27 LAB
INR PPP: 2.6 (ref 0.8–1.2)
PROTHROMBIN TIME: 30.7 SECONDS (ref 12.8–15.2)

## 2017-06-27 PROCEDURE — 85610 PROTHROMBIN TIME: CPT | Performed by: INTERNAL MEDICINE

## 2017-06-27 NOTE — LETTER
[address] [address]  Phone: [phone]  Fax: [phone]    Yoko Rai MD        June 27, 2017     [address]      Dear Tramaine Golden: Your lab results were stable. If you have any questions or concerns, please don't hesitate to call.     Sincerely,        Yoko Rai MD

## 2017-06-29 ENCOUNTER — TELEPHONE (OUTPATIENT)
Dept: INTERNAL MEDICINE | Age: 82
End: 2017-06-29

## 2017-06-29 ENCOUNTER — LAB REQUISITION (OUTPATIENT)
Dept: LAB | Facility: HOSPITAL | Age: 82
End: 2017-06-29

## 2017-06-29 DIAGNOSIS — Z00.00 ENCOUNTER FOR GENERAL ADULT MEDICAL EXAMINATION WITHOUT ABNORMAL FINDINGS: ICD-10-CM

## 2017-06-29 LAB
ALBUMIN SERPL-MCNC: 3.8 G/DL (ref 3.5–5)
ALBUMIN/GLOB SERPL: 1.4 G/DL (ref 1.1–2.5)
ALP SERPL-CCNC: 67 U/L (ref 24–120)
ALT SERPL W P-5'-P-CCNC: 38 U/L (ref 0–54)
ANION GAP SERPL CALCULATED.3IONS-SCNC: 10 MMOL/L (ref 4–13)
AST SERPL-CCNC: 27 U/L (ref 7–45)
BASOPHILS # BLD AUTO: 0.02 10*3/MM3 (ref 0–0.2)
BASOPHILS NFR BLD AUTO: 0.3 % (ref 0–2)
BILIRUB SERPL-MCNC: 0.7 MG/DL (ref 0.1–1)
BUN BLD-MCNC: 17 MG/DL (ref 5–21)
BUN/CREAT SERPL: 14.3 (ref 7–25)
CALCIUM SPEC-SCNC: 9.4 MG/DL (ref 8.4–10.4)
CHLORIDE SERPL-SCNC: 105 MMOL/L (ref 98–110)
CO2 SERPL-SCNC: 24 MMOL/L (ref 24–31)
CREAT BLD-MCNC: 1.19 MG/DL (ref 0.5–1.4)
DEPRECATED RDW RBC AUTO: 49.6 FL (ref 40–54)
DIGOXIN SERPL-MCNC: 0.5 NG/ML (ref 0.8–2)
EOSINOPHIL # BLD AUTO: 0.11 10*3/MM3 (ref 0–0.7)
EOSINOPHIL NFR BLD AUTO: 1.6 % (ref 0–4)
ERYTHROCYTE [DISTWIDTH] IN BLOOD BY AUTOMATED COUNT: 14.8 % (ref 12–15)
GFR SERPL CREATININE-BSD FRML MDRD: 43 ML/MIN/1.73
GLOBULIN UR ELPH-MCNC: 2.8 GM/DL
GLUCOSE BLD-MCNC: 131 MG/DL (ref 70–100)
HCT VFR BLD AUTO: 37.3 % (ref 37–47)
HGB BLD-MCNC: 11.8 G/DL (ref 12–16)
IMM GRANULOCYTES # BLD: 0.01 10*3/MM3 (ref 0–0.03)
IMM GRANULOCYTES NFR BLD: 0.1 % (ref 0–5)
INR PPP: 2.18 (ref 0.91–1.09)
LYMPHOCYTES # BLD AUTO: 1.48 10*3/MM3 (ref 0.72–4.86)
LYMPHOCYTES NFR BLD AUTO: 22.1 % (ref 15–45)
MCH RBC QN AUTO: 28.9 PG (ref 28–32)
MCHC RBC AUTO-ENTMCNC: 31.6 G/DL (ref 33–36)
MCV RBC AUTO: 91.2 FL (ref 82–98)
MONOCYTES # BLD AUTO: 0.54 10*3/MM3 (ref 0.19–1.3)
MONOCYTES NFR BLD AUTO: 8.1 % (ref 4–12)
NEUTROPHILS # BLD AUTO: 4.53 10*3/MM3 (ref 1.87–8.4)
NEUTROPHILS NFR BLD AUTO: 67.8 % (ref 39–78)
PLATELET # BLD AUTO: 241 10*3/MM3 (ref 130–400)
PMV BLD AUTO: 10.2 FL (ref 6–12)
POTASSIUM BLD-SCNC: 4.4 MMOL/L (ref 3.5–5.3)
PROT SERPL-MCNC: 6.6 G/DL (ref 6.3–8.7)
PROTHROMBIN TIME: 25.1 SECONDS (ref 11.9–14.6)
RBC # BLD AUTO: 4.09 10*6/MM3 (ref 4.2–5.4)
SODIUM BLD-SCNC: 139 MMOL/L (ref 135–145)
WBC NRBC COR # BLD: 6.69 10*3/MM3 (ref 4.8–10.8)

## 2017-06-29 PROCEDURE — 85025 COMPLETE CBC W/AUTO DIFF WBC: CPT | Performed by: INTERNAL MEDICINE

## 2017-06-29 PROCEDURE — 80053 COMPREHEN METABOLIC PANEL: CPT | Performed by: INTERNAL MEDICINE

## 2017-06-29 PROCEDURE — 80162 ASSAY OF DIGOXIN TOTAL: CPT | Performed by: INTERNAL MEDICINE

## 2017-06-29 PROCEDURE — 85610 PROTHROMBIN TIME: CPT | Performed by: INTERNAL MEDICINE

## 2017-06-30 ENCOUNTER — HOSPITAL ENCOUNTER (OUTPATIENT)
Dept: CARDIOLOGY | Facility: HOSPITAL | Age: 82
Discharge: HOME | End: 2017-06-30
Attending: INTERNAL MEDICINE | Admitting: INTERNAL MEDICINE

## 2017-06-30 VITALS — DIASTOLIC BLOOD PRESSURE: 78 MMHG | SYSTOLIC BLOOD PRESSURE: 137 MMHG

## 2017-06-30 PROCEDURE — 93306 TTE W/DOPPLER COMPLETE: CPT | Performed by: INTERNAL MEDICINE

## 2017-06-30 PROCEDURE — 93306 TTE W/DOPPLER COMPLETE: CPT

## 2017-07-03 ENCOUNTER — LAB REQUISITION (OUTPATIENT)
Dept: LAB | Facility: HOSPITAL | Age: 82
End: 2017-07-03

## 2017-07-03 DIAGNOSIS — Z00.00 ENCOUNTER FOR GENERAL ADULT MEDICAL EXAMINATION WITHOUT ABNORMAL FINDINGS: ICD-10-CM

## 2017-07-03 LAB
ANION GAP SERPL CALCULATED.3IONS-SCNC: 10 MMOL/L (ref 4–13)
BASOPHILS # BLD AUTO: 0.02 10*3/MM3 (ref 0–0.2)
BASOPHILS NFR BLD AUTO: 0.3 % (ref 0–2)
BH CV ECHO MEAS - AO MAX PG (FULL): 8.2 MMHG
BH CV ECHO MEAS - AO MAX PG: 9.7 MMHG
BH CV ECHO MEAS - AO MEAN PG (FULL): 6 MMHG
BH CV ECHO MEAS - AO MEAN PG: 7 MMHG
BH CV ECHO MEAS - AO ROOT AREA (BSA CORRECTED): 2.1
BH CV ECHO MEAS - AO ROOT AREA: 8 CM^2
BH CV ECHO MEAS - AO ROOT DIAM: 3.2 CM
BH CV ECHO MEAS - AO V2 MAX: 156 CM/SEC
BH CV ECHO MEAS - AO V2 MEAN: 128 CM/SEC
BH CV ECHO MEAS - AO V2 VTI: 37.2 CM
BH CV ECHO MEAS - AVA(I,A): 0.93 CM^2
BH CV ECHO MEAS - AVA(I,D): 0.93 CM^2
BH CV ECHO MEAS - AVA(V,A): 1.1 CM^2
BH CV ECHO MEAS - AVA(V,D): 1.1 CM^2
BH CV ECHO MEAS - BSA(HAYCOCK): 1.6 M^2
BH CV ECHO MEAS - BSA: 1.6 M^2
BH CV ECHO MEAS - BZI_BMI: 24 KILOGRAMS/M^2
BH CV ECHO MEAS - BZI_METRIC_HEIGHT: 154.9 CM
BH CV ECHO MEAS - BZI_METRIC_WEIGHT: 57.6 KG
BH CV ECHO MEAS - CONTRAST EF 4CH: 60.8 ML/M^2
BH CV ECHO MEAS - EDV(CUBED): 79.5 ML
BH CV ECHO MEAS - EDV(MOD-SP4): 40.3 ML
BH CV ECHO MEAS - EDV(TEICH): 83.1 ML
BH CV ECHO MEAS - EF(CUBED): 69.3 %
BH CV ECHO MEAS - EF(MOD-SP4): 60.8 %
BH CV ECHO MEAS - EF(TEICH): 61.2 %
BH CV ECHO MEAS - ESV(CUBED): 24.4 ML
BH CV ECHO MEAS - ESV(MOD-SP4): 15.8 ML
BH CV ECHO MEAS - ESV(TEICH): 32.2 ML
BH CV ECHO MEAS - FS: 32.6 %
BH CV ECHO MEAS - IVS/LVPW: 0.87
BH CV ECHO MEAS - IVSD: 1.3 CM
BH CV ECHO MEAS - LA DIMENSION: 5 CM
BH CV ECHO MEAS - LA/AO: 1.6
BH CV ECHO MEAS - LV DIASTOLIC VOL/BSA (35-75): 25.9 ML/M^2
BH CV ECHO MEAS - LV MASS(C)D: 232.2 GRAMS
BH CV ECHO MEAS - LV MASS(C)DI: 149.1 GRAMS/M^2
BH CV ECHO MEAS - LV MAX PG: 1.5 MMHG
BH CV ECHO MEAS - LV MEAN PG: 1 MMHG
BH CV ECHO MEAS - LV SYSTOLIC VOL/BSA (12-30): 10.1 ML/M^2
BH CV ECHO MEAS - LV V1 MAX: 62.1 CM/SEC
BH CV ECHO MEAS - LV V1 MEAN: 41.9 CM/SEC
BH CV ECHO MEAS - LV V1 VTI: 12.2 CM
BH CV ECHO MEAS - LVIDD: 4.3 CM
BH CV ECHO MEAS - LVIDS: 2.9 CM
BH CV ECHO MEAS - LVLD AP4: 4.4 CM
BH CV ECHO MEAS - LVLS AP4: 3.9 CM
BH CV ECHO MEAS - LVOT AREA (M): 2.8 CM^2
BH CV ECHO MEAS - LVOT AREA: 2.8 CM^2
BH CV ECHO MEAS - LVOT DIAM: 1.9 CM
BH CV ECHO MEAS - LVPWD: 1.5 CM
BH CV ECHO MEAS - MR ALIAS VEL: 30.8 CM/SEC
BH CV ECHO MEAS - MR ERO: 0.19 CM^2
BH CV ECHO MEAS - MR FLOW RATE: 94.8 CM^3/SEC
BH CV ECHO MEAS - MR MAX PG: 100.8 MMHG
BH CV ECHO MEAS - MR MAX VEL: 502 CM/SEC
BH CV ECHO MEAS - MR MEAN PG: 63 MMHG
BH CV ECHO MEAS - MR MEAN VEL: 371 CM/SEC
BH CV ECHO MEAS - MR PISA RADIUS: 0.7 CM
BH CV ECHO MEAS - MR PISA: 3.1 CM^2
BH CV ECHO MEAS - MR VOLUME: 38.9 ML
BH CV ECHO MEAS - MR VTI: 206 CM
BH CV ECHO MEAS - MV A MAX VEL: 35.9 CM/SEC
BH CV ECHO MEAS - MV DEC TIME: 0.15 SEC
BH CV ECHO MEAS - MV E MAX VEL: 96 CM/SEC
BH CV ECHO MEAS - MV E/A: 2.7
BH CV ECHO MEAS - RAP SYSTOLE: 10 MMHG
BH CV ECHO MEAS - RVSP: 47.9 MMHG
BH CV ECHO MEAS - SI(AO): 192.1 ML/M^2
BH CV ECHO MEAS - SI(CUBED): 35.4 ML/M^2
BH CV ECHO MEAS - SI(LVOT): 22.2 ML/M^2
BH CV ECHO MEAS - SI(MOD-SP4): 15.7 ML/M^2
BH CV ECHO MEAS - SI(TEICH): 32.7 ML/M^2
BH CV ECHO MEAS - SV(AO): 299.2 ML
BH CV ECHO MEAS - SV(CUBED): 55.1 ML
BH CV ECHO MEAS - SV(LVOT): 34.6 ML
BH CV ECHO MEAS - SV(MOD-SP4): 24.5 ML
BH CV ECHO MEAS - SV(TEICH): 50.9 ML
BH CV ECHO MEAS - TR MAX VEL: 308 CM/SEC
BUN BLD-MCNC: 18 MG/DL (ref 5–21)
BUN/CREAT SERPL: 13.6 (ref 7–25)
CALCIUM SPEC-SCNC: 9.3 MG/DL (ref 8.4–10.4)
CHLORIDE SERPL-SCNC: 99 MMOL/L (ref 98–110)
CO2 SERPL-SCNC: 32 MMOL/L (ref 24–31)
CREAT BLD-MCNC: 1.32 MG/DL (ref 0.5–1.4)
DEPRECATED RDW RBC AUTO: 48.5 FL (ref 40–54)
E/E' RATIO: 11.2
EOSINOPHIL # BLD AUTO: 0.12 10*3/MM3 (ref 0–0.7)
EOSINOPHIL NFR BLD AUTO: 1.5 % (ref 0–4)
ERYTHROCYTE [DISTWIDTH] IN BLOOD BY AUTOMATED COUNT: 14.6 % (ref 12–15)
GFR SERPL CREATININE-BSD FRML MDRD: 38 ML/MIN/1.73
GLUCOSE BLD-MCNC: 110 MG/DL (ref 70–100)
HCT VFR BLD AUTO: 38.5 % (ref 37–47)
HGB BLD-MCNC: 12.2 G/DL (ref 12–16)
IMM GRANULOCYTES # BLD: 0.02 10*3/MM3 (ref 0–0.03)
IMM GRANULOCYTES NFR BLD: 0.3 % (ref 0–5)
LEFT ATRIUM VOLUME INDEX: 67.9 ML/M2
LEFT ATRIUM VOLUME: 106 CM3
LYMPHOCYTES # BLD AUTO: 1.82 10*3/MM3 (ref 0.72–4.86)
LYMPHOCYTES NFR BLD AUTO: 23.4 % (ref 15–45)
MCH RBC QN AUTO: 28.8 PG (ref 28–32)
MCHC RBC AUTO-ENTMCNC: 31.7 G/DL (ref 33–36)
MCV RBC AUTO: 90.8 FL (ref 82–98)
MONOCYTES # BLD AUTO: 0.74 10*3/MM3 (ref 0.19–1.3)
MONOCYTES NFR BLD AUTO: 9.5 % (ref 4–12)
NEUTROPHILS # BLD AUTO: 5.07 10*3/MM3 (ref 1.87–8.4)
NEUTROPHILS NFR BLD AUTO: 65 % (ref 39–78)
PLATELET # BLD AUTO: 243 10*3/MM3 (ref 130–400)
PMV BLD AUTO: 10.1 FL (ref 6–12)
POTASSIUM BLD-SCNC: 3.9 MMOL/L (ref 3.5–5.3)
RBC # BLD AUTO: 4.24 10*6/MM3 (ref 4.2–5.4)
SODIUM BLD-SCNC: 141 MMOL/L (ref 135–145)
WBC NRBC COR # BLD: 7.79 10*3/MM3 (ref 4.8–10.8)

## 2017-07-03 PROCEDURE — 85025 COMPLETE CBC W/AUTO DIFF WBC: CPT | Performed by: INTERNAL MEDICINE

## 2017-07-03 PROCEDURE — 80048 BASIC METABOLIC PNL TOTAL CA: CPT | Performed by: INTERNAL MEDICINE

## 2017-07-06 ENCOUNTER — LAB REQUISITION (OUTPATIENT)
Dept: LAB | Facility: HOSPITAL | Age: 82
End: 2017-07-06

## 2017-07-06 DIAGNOSIS — Z00.00 ENCOUNTER FOR GENERAL ADULT MEDICAL EXAMINATION WITHOUT ABNORMAL FINDINGS: ICD-10-CM

## 2017-07-06 LAB
ALBUMIN SERPL-MCNC: 3.9 G/DL (ref 3.5–5)
ALBUMIN/GLOB SERPL: 1.3 G/DL (ref 1.1–2.5)
ALP SERPL-CCNC: 72 U/L (ref 24–120)
ALT SERPL W P-5'-P-CCNC: 35 U/L (ref 0–54)
ANION GAP SERPL CALCULATED.3IONS-SCNC: 10 MMOL/L (ref 4–13)
AST SERPL-CCNC: 28 U/L (ref 7–45)
BASOPHILS # BLD AUTO: 0.02 10*3/MM3 (ref 0–0.2)
BASOPHILS NFR BLD AUTO: 0.2 % (ref 0–2)
BILIRUB SERPL-MCNC: 0.6 MG/DL (ref 0.1–1)
BUN BLD-MCNC: 23 MG/DL (ref 5–21)
BUN/CREAT SERPL: 17.4 (ref 7–25)
CALCIUM SPEC-SCNC: 9.6 MG/DL (ref 8.4–10.4)
CHLORIDE SERPL-SCNC: 100 MMOL/L (ref 98–110)
CO2 SERPL-SCNC: 30 MMOL/L (ref 24–31)
CREAT BLD-MCNC: 1.32 MG/DL (ref 0.5–1.4)
DEPRECATED RDW RBC AUTO: 47.3 FL (ref 40–54)
DIGOXIN SERPL-MCNC: 0.5 NG/ML (ref 0.8–2)
EOSINOPHIL # BLD AUTO: 0.18 10*3/MM3 (ref 0–0.7)
EOSINOPHIL NFR BLD AUTO: 2.1 % (ref 0–4)
ERYTHROCYTE [DISTWIDTH] IN BLOOD BY AUTOMATED COUNT: 14.3 % (ref 12–15)
GFR SERPL CREATININE-BSD FRML MDRD: 38 ML/MIN/1.73
GLOBULIN UR ELPH-MCNC: 2.9 GM/DL
GLUCOSE BLD-MCNC: 106 MG/DL (ref 70–100)
HCT VFR BLD AUTO: 39.8 % (ref 37–47)
HGB BLD-MCNC: 12.7 G/DL (ref 12–16)
IMM GRANULOCYTES # BLD: 0.02 10*3/MM3 (ref 0–0.03)
IMM GRANULOCYTES NFR BLD: 0.2 % (ref 0–5)
INR PPP: 1.48 (ref 0.91–1.09)
LYMPHOCYTES # BLD AUTO: 2.04 10*3/MM3 (ref 0.72–4.86)
LYMPHOCYTES NFR BLD AUTO: 23.8 % (ref 15–45)
MCH RBC QN AUTO: 28.9 PG (ref 28–32)
MCHC RBC AUTO-ENTMCNC: 31.9 G/DL (ref 33–36)
MCV RBC AUTO: 90.5 FL (ref 82–98)
MONOCYTES # BLD AUTO: 0.79 10*3/MM3 (ref 0.19–1.3)
MONOCYTES NFR BLD AUTO: 9.2 % (ref 4–12)
NEUTROPHILS # BLD AUTO: 5.52 10*3/MM3 (ref 1.87–8.4)
NEUTROPHILS NFR BLD AUTO: 64.5 % (ref 39–78)
PLATELET # BLD AUTO: 220 10*3/MM3 (ref 130–400)
PMV BLD AUTO: 10 FL (ref 6–12)
POTASSIUM BLD-SCNC: 3.6 MMOL/L (ref 3.5–5.3)
PROT SERPL-MCNC: 6.8 G/DL (ref 6.3–8.7)
PROTHROMBIN TIME: 18.4 SECONDS (ref 11.9–14.6)
RBC # BLD AUTO: 4.4 10*6/MM3 (ref 4.2–5.4)
SODIUM BLD-SCNC: 140 MMOL/L (ref 135–145)
WBC NRBC COR # BLD: 8.57 10*3/MM3 (ref 4.8–10.8)

## 2017-07-06 PROCEDURE — 85610 PROTHROMBIN TIME: CPT | Performed by: INTERNAL MEDICINE

## 2017-07-06 PROCEDURE — 80162 ASSAY OF DIGOXIN TOTAL: CPT | Performed by: INTERNAL MEDICINE

## 2017-07-06 PROCEDURE — 80053 COMPREHEN METABOLIC PANEL: CPT | Performed by: INTERNAL MEDICINE

## 2017-07-06 PROCEDURE — 85025 COMPLETE CBC W/AUTO DIFF WBC: CPT | Performed by: INTERNAL MEDICINE

## 2017-07-13 ENCOUNTER — TELEPHONE (OUTPATIENT)
Dept: INTERNAL MEDICINE | Age: 82
End: 2017-07-13

## 2017-07-14 ENCOUNTER — LAB REQUISITION (OUTPATIENT)
Dept: LAB | Facility: HOSPITAL | Age: 82
End: 2017-07-14

## 2017-07-14 DIAGNOSIS — Z00.00 ENCOUNTER FOR GENERAL ADULT MEDICAL EXAMINATION WITHOUT ABNORMAL FINDINGS: ICD-10-CM

## 2017-07-14 LAB
ALBUMIN SERPL-MCNC: 3.7 G/DL (ref 3.5–5)
ALBUMIN/GLOB SERPL: 1.4 G/DL (ref 1.1–2.5)
ALP SERPL-CCNC: 58 U/L (ref 24–120)
ALT SERPL W P-5'-P-CCNC: 38 U/L (ref 0–54)
ANION GAP SERPL CALCULATED.3IONS-SCNC: 10 MMOL/L (ref 4–13)
AST SERPL-CCNC: 24 U/L (ref 7–45)
BASOPHILS # BLD AUTO: 0.02 10*3/MM3 (ref 0–0.2)
BASOPHILS NFR BLD AUTO: 0.2 % (ref 0–2)
BILIRUB SERPL-MCNC: 1.5 MG/DL (ref 0.1–1)
BUN BLD-MCNC: 18 MG/DL (ref 5–21)
BUN/CREAT SERPL: 15.8 (ref 7–25)
CALCIUM SPEC-SCNC: 9.2 MG/DL (ref 8.4–10.4)
CHLORIDE SERPL-SCNC: 101 MMOL/L (ref 98–110)
CO2 SERPL-SCNC: 29 MMOL/L (ref 24–31)
CREAT BLD-MCNC: 1.14 MG/DL (ref 0.5–1.4)
DEPRECATED RDW RBC AUTO: 46.3 FL (ref 40–54)
DIGOXIN SERPL-MCNC: 0.6 NG/ML (ref 0.8–2)
EOSINOPHIL # BLD AUTO: 0.1 10*3/MM3 (ref 0–0.7)
EOSINOPHIL NFR BLD AUTO: 0.9 % (ref 0–4)
ERYTHROCYTE [DISTWIDTH] IN BLOOD BY AUTOMATED COUNT: 14.3 % (ref 12–15)
GFR SERPL CREATININE-BSD FRML MDRD: 45 ML/MIN/1.73
GLOBULIN UR ELPH-MCNC: 2.6 GM/DL
GLUCOSE BLD-MCNC: 95 MG/DL (ref 70–100)
HCT VFR BLD AUTO: 36.8 % (ref 37–47)
HGB BLD-MCNC: 11.9 G/DL (ref 12–16)
IMM GRANULOCYTES # BLD: 0.04 10*3/MM3 (ref 0–0.03)
IMM GRANULOCYTES NFR BLD: 0.4 % (ref 0–5)
INR PPP: 1.6 (ref 0.91–1.09)
LYMPHOCYTES # BLD AUTO: 1.92 10*3/MM3 (ref 0.72–4.86)
LYMPHOCYTES NFR BLD AUTO: 17 % (ref 15–45)
MCH RBC QN AUTO: 28.8 PG (ref 28–32)
MCHC RBC AUTO-ENTMCNC: 32.3 G/DL (ref 33–36)
MCV RBC AUTO: 89.1 FL (ref 82–98)
MONOCYTES # BLD AUTO: 1.05 10*3/MM3 (ref 0.19–1.3)
MONOCYTES NFR BLD AUTO: 9.3 % (ref 4–12)
NEUTROPHILS # BLD AUTO: 8.15 10*3/MM3 (ref 1.87–8.4)
NEUTROPHILS NFR BLD AUTO: 72.2 % (ref 39–78)
PLATELET # BLD AUTO: 202 10*3/MM3 (ref 130–400)
PMV BLD AUTO: 10.7 FL (ref 6–12)
POTASSIUM BLD-SCNC: 3.5 MMOL/L (ref 3.5–5.3)
PROT SERPL-MCNC: 6.3 G/DL (ref 6.3–8.7)
PROTHROMBIN TIME: 19.6 SECONDS (ref 11.9–14.6)
RBC # BLD AUTO: 4.13 10*6/MM3 (ref 4.2–5.4)
SODIUM BLD-SCNC: 140 MMOL/L (ref 135–145)
WBC NRBC COR # BLD: 11.28 10*3/MM3 (ref 4.8–10.8)

## 2017-07-14 PROCEDURE — 85025 COMPLETE CBC W/AUTO DIFF WBC: CPT | Performed by: INTERNAL MEDICINE

## 2017-07-14 PROCEDURE — 80162 ASSAY OF DIGOXIN TOTAL: CPT | Performed by: INTERNAL MEDICINE

## 2017-07-14 PROCEDURE — 85610 PROTHROMBIN TIME: CPT | Performed by: INTERNAL MEDICINE

## 2017-07-14 PROCEDURE — 80053 COMPREHEN METABOLIC PANEL: CPT | Performed by: INTERNAL MEDICINE

## 2017-07-21 ENCOUNTER — TELEPHONE (OUTPATIENT)
Dept: INTERNAL MEDICINE | Age: 82
End: 2017-07-21

## 2017-07-21 ENCOUNTER — LAB REQUISITION (OUTPATIENT)
Dept: LAB | Facility: HOSPITAL | Age: 82
End: 2017-07-21

## 2017-07-21 DIAGNOSIS — Z00.00 ENCOUNTER FOR GENERAL ADULT MEDICAL EXAMINATION WITHOUT ABNORMAL FINDINGS: ICD-10-CM

## 2017-07-21 LAB
INR PPP: 2.2 (ref 0.8–1.2)
PROTHROMBIN TIME: 26.6 SECONDS (ref 12.8–15.2)

## 2017-07-21 PROCEDURE — 85610 PROTHROMBIN TIME: CPT | Performed by: INTERNAL MEDICINE

## 2017-07-25 RX ORDER — FUROSEMIDE 20 MG/1
20 TABLET ORAL 2 TIMES DAILY
Qty: 180 TABLET | Refills: 3 | Status: SHIPPED | OUTPATIENT
Start: 2017-07-25 | End: 2017-08-07 | Stop reason: SDUPTHER

## 2017-07-28 ENCOUNTER — TELEPHONE (OUTPATIENT)
Dept: INTERNAL MEDICINE | Age: 82
End: 2017-07-28

## 2017-07-28 PROCEDURE — 85610 PROTHROMBIN TIME: CPT | Performed by: INTERNAL MEDICINE

## 2017-07-31 ENCOUNTER — LAB REQUISITION (OUTPATIENT)
Dept: LAB | Facility: HOSPITAL | Age: 82
End: 2017-07-31

## 2017-07-31 DIAGNOSIS — Z00.00 ENCOUNTER FOR GENERAL ADULT MEDICAL EXAMINATION WITHOUT ABNORMAL FINDINGS: ICD-10-CM

## 2017-07-31 LAB
INR PPP: 2.7 (ref 0.8–1.2)
PROTHROMBIN TIME: 32.8 SECONDS (ref 12.8–15.2)

## 2017-08-02 RX ORDER — SPIRONOLACTONE 25 MG/1
25 TABLET ORAL DAILY
COMMUNITY
End: 2018-06-28 | Stop reason: ALTCHOICE

## 2017-08-03 ENCOUNTER — LAB REQUISITION (OUTPATIENT)
Dept: LAB | Facility: HOSPITAL | Age: 82
End: 2017-08-03

## 2017-08-03 DIAGNOSIS — Z00.00 ENCOUNTER FOR GENERAL ADULT MEDICAL EXAMINATION WITHOUT ABNORMAL FINDINGS: ICD-10-CM

## 2017-08-03 LAB
INR PPP: 1.89 (ref 0.91–1.09)
PROTHROMBIN TIME: 22.4 SECONDS (ref 11.9–14.6)

## 2017-08-03 PROCEDURE — 85610 PROTHROMBIN TIME: CPT | Performed by: INTERNAL MEDICINE

## 2017-08-07 ENCOUNTER — OFFICE VISIT (OUTPATIENT)
Dept: INTERNAL MEDICINE | Age: 82
End: 2017-08-07
Payer: MEDICARE

## 2017-08-07 VITALS
HEIGHT: 63 IN | DIASTOLIC BLOOD PRESSURE: 60 MMHG | WEIGHT: 130 LBS | HEART RATE: 62 BPM | SYSTOLIC BLOOD PRESSURE: 90 MMHG | BODY MASS INDEX: 23.04 KG/M2

## 2017-08-07 DIAGNOSIS — I50.42 CHRONIC COMBINED SYSTOLIC AND DIASTOLIC CONGESTIVE HEART FAILURE (HCC): ICD-10-CM

## 2017-08-07 DIAGNOSIS — I50.42 CHRONIC COMBINED SYSTOLIC AND DIASTOLIC CONGESTIVE HEART FAILURE (HCC): Primary | ICD-10-CM

## 2017-08-07 DIAGNOSIS — I48.91 ATRIAL FIBRILLATION, UNSPECIFIED TYPE (HCC): ICD-10-CM

## 2017-08-07 DIAGNOSIS — I10 ESSENTIAL HYPERTENSION: ICD-10-CM

## 2017-08-07 DIAGNOSIS — R60.0 LOCALIZED EDEMA: ICD-10-CM

## 2017-08-07 DIAGNOSIS — Z79.01 LONG TERM (CURRENT) USE OF ANTICOAGULANTS: ICD-10-CM

## 2017-08-07 DIAGNOSIS — I48.91 ATRIAL FIBRILLATION, UNSPECIFIED TYPE (HCC): Primary | ICD-10-CM

## 2017-08-07 LAB
ALBUMIN SERPL-MCNC: 3.9 G/DL (ref 3.5–5.2)
ALP BLD-CCNC: 61 U/L (ref 35–104)
ALT SERPL-CCNC: 8 U/L (ref 5–33)
ANION GAP SERPL CALCULATED.3IONS-SCNC: 13 MMOL/L (ref 7–19)
AST SERPL-CCNC: 17 U/L (ref 5–32)
BILIRUB SERPL-MCNC: 0.7 MG/DL (ref 0.2–1.2)
BUN BLDV-MCNC: 18 MG/DL (ref 8–23)
CALCIUM SERPL-MCNC: 9.4 MG/DL (ref 8.8–10.2)
CHLORIDE BLD-SCNC: 102 MMOL/L (ref 98–111)
CO2: 28 MMOL/L (ref 22–29)
CREAT SERPL-MCNC: 1.2 MG/DL (ref 0.5–0.9)
GFR NON-AFRICAN AMERICAN: 43
GLUCOSE BLD-MCNC: 138 MG/DL (ref 74–109)
HCT VFR BLD CALC: 36.9 % (ref 37–47)
HEMOGLOBIN: 11.5 G/DL (ref 12–16)
INR BLD: 1.89 (ref 0.88–1.18)
MCH RBC QN AUTO: 29 PG (ref 27–31)
MCHC RBC AUTO-ENTMCNC: 31.2 G/DL (ref 33–37)
MCV RBC AUTO: 92.9 FL (ref 81–99)
PDW BLD-RTO: 14.2 % (ref 11.5–14.5)
PLATELET # BLD: 197 K/UL (ref 130–400)
PMV BLD AUTO: 9.8 FL (ref 9.4–12.3)
POTASSIUM SERPL-SCNC: 3.5 MMOL/L (ref 3.5–5)
PRO-BNP: 2489 PG/ML (ref 0–1800)
PROTHROMBIN TIME: 21.8 SEC (ref 12–14.6)
RBC # BLD: 3.97 M/UL (ref 4.2–5.4)
SODIUM BLD-SCNC: 143 MMOL/L (ref 136–145)
TOTAL PROTEIN: 6.7 G/DL (ref 6.6–8.7)
TSH SERPL DL<=0.05 MIU/L-ACNC: 0.26 UIU/ML (ref 0.27–4.2)
WBC # BLD: 7.6 K/UL (ref 4.8–10.8)

## 2017-08-07 PROCEDURE — 1090F PRES/ABSN URINE INCON ASSESS: CPT | Performed by: INTERNAL MEDICINE

## 2017-08-07 PROCEDURE — 1036F TOBACCO NON-USER: CPT | Performed by: INTERNAL MEDICINE

## 2017-08-07 PROCEDURE — 1123F ACP DISCUSS/DSCN MKR DOCD: CPT | Performed by: INTERNAL MEDICINE

## 2017-08-07 PROCEDURE — 4040F PNEUMOC VAC/ADMIN/RCVD: CPT | Performed by: INTERNAL MEDICINE

## 2017-08-07 PROCEDURE — G8598 ASA/ANTIPLAT THER USED: HCPCS | Performed by: INTERNAL MEDICINE

## 2017-08-07 PROCEDURE — 99214 OFFICE O/P EST MOD 30 MIN: CPT | Performed by: INTERNAL MEDICINE

## 2017-08-07 PROCEDURE — G8427 DOCREV CUR MEDS BY ELIG CLIN: HCPCS | Performed by: INTERNAL MEDICINE

## 2017-08-07 PROCEDURE — G8420 CALC BMI NORM PARAMETERS: HCPCS | Performed by: INTERNAL MEDICINE

## 2017-08-07 RX ORDER — FUROSEMIDE 40 MG/1
60 TABLET ORAL DAILY
Qty: 135 TABLET | Refills: 3 | Status: SHIPPED | OUTPATIENT
Start: 2017-08-07 | End: 2017-10-27 | Stop reason: SDUPTHER

## 2017-08-07 RX ORDER — FUROSEMIDE 40 MG/1
TABLET ORAL
Qty: 60 TABLET | Refills: 5
Start: 2017-08-07 | End: 2017-11-30

## 2017-08-10 ASSESSMENT — ENCOUNTER SYMPTOMS
SHORTNESS OF BREATH: 0
ABDOMINAL PAIN: 0
EYE REDNESS: 0
COUGH: 0

## 2017-08-14 ENCOUNTER — TELEPHONE (OUTPATIENT)
Dept: INTERNAL MEDICINE | Age: 82
End: 2017-08-14

## 2017-08-14 ENCOUNTER — LAB REQUISITION (OUTPATIENT)
Dept: LAB | Facility: HOSPITAL | Age: 82
End: 2017-08-14

## 2017-08-14 DIAGNOSIS — Z00.00 ENCOUNTER FOR GENERAL ADULT MEDICAL EXAMINATION WITHOUT ABNORMAL FINDINGS: ICD-10-CM

## 2017-08-14 LAB
INR PPP: 1.9 (ref 0.8–1.2)
PROTHROMBIN TIME: 22.8 SECONDS (ref 12.8–15.2)

## 2017-08-14 PROCEDURE — 85610 PROTHROMBIN TIME: CPT | Performed by: INTERNAL MEDICINE

## 2017-08-24 RX ORDER — ESCITALOPRAM OXALATE 10 MG/1
TABLET ORAL
Qty: 135 TABLET | Refills: 3 | Status: SHIPPED | OUTPATIENT
Start: 2017-08-24 | End: 2018-11-17 | Stop reason: SDUPTHER

## 2017-09-05 ENCOUNTER — TELEPHONE (OUTPATIENT)
Dept: INTERNAL MEDICINE | Age: 82
End: 2017-09-05

## 2017-09-05 ENCOUNTER — LAB REQUISITION (OUTPATIENT)
Dept: LAB | Facility: HOSPITAL | Age: 82
End: 2017-09-05

## 2017-09-05 DIAGNOSIS — Z00.00 ENCOUNTER FOR GENERAL ADULT MEDICAL EXAMINATION WITHOUT ABNORMAL FINDINGS: ICD-10-CM

## 2017-09-05 LAB
ALBUMIN SERPL-MCNC: 4 G/DL (ref 3.5–5)
ALBUMIN/GLOB SERPL: 1.5 G/DL (ref 1.1–2.5)
ALP SERPL-CCNC: 61 U/L (ref 24–120)
ALT SERPL W P-5'-P-CCNC: 25 U/L (ref 0–54)
ANION GAP SERPL CALCULATED.3IONS-SCNC: 11 MMOL/L (ref 4–13)
AST SERPL-CCNC: 29 U/L (ref 7–45)
BASOPHILS # BLD AUTO: 0.01 10*3/MM3 (ref 0–0.2)
BASOPHILS NFR BLD AUTO: 0.1 % (ref 0–2)
BILIRUB SERPL-MCNC: 0.7 MG/DL (ref 0.1–1)
BUN BLD-MCNC: 17 MG/DL (ref 5–21)
BUN/CREAT SERPL: 14.9 (ref 7–25)
CALCIUM SPEC-SCNC: 9.4 MG/DL (ref 8.4–10.4)
CHLORIDE SERPL-SCNC: 103 MMOL/L (ref 98–110)
CO2 SERPL-SCNC: 28 MMOL/L (ref 24–31)
CREAT BLD-MCNC: 1.14 MG/DL (ref 0.5–1.4)
DEPRECATED RDW RBC AUTO: 48.6 FL (ref 40–54)
EOSINOPHIL # BLD AUTO: 0.12 10*3/MM3 (ref 0–0.7)
EOSINOPHIL NFR BLD AUTO: 1.7 % (ref 0–4)
ERYTHROCYTE [DISTWIDTH] IN BLOOD BY AUTOMATED COUNT: 14.8 % (ref 12–15)
GFR SERPL CREATININE-BSD FRML MDRD: 45 ML/MIN/1.73
GLOBULIN UR ELPH-MCNC: 2.7 GM/DL
GLUCOSE BLD-MCNC: 95 MG/DL (ref 70–100)
HCT VFR BLD AUTO: 38.9 % (ref 37–47)
HGB BLD-MCNC: 12.3 G/DL (ref 12–16)
IMM GRANULOCYTES # BLD: 0.02 10*3/MM3 (ref 0–0.03)
IMM GRANULOCYTES NFR BLD: 0.3 % (ref 0–5)
INR PPP: 1.7 (ref 0.91–1.09)
LYMPHOCYTES # BLD AUTO: 1.39 10*3/MM3 (ref 0.72–4.86)
LYMPHOCYTES NFR BLD AUTO: 19.7 % (ref 15–45)
MCH RBC QN AUTO: 28.5 PG (ref 28–32)
MCHC RBC AUTO-ENTMCNC: 31.6 G/DL (ref 33–36)
MCV RBC AUTO: 90 FL (ref 82–98)
MONOCYTES # BLD AUTO: 0.67 10*3/MM3 (ref 0.19–1.3)
MONOCYTES NFR BLD AUTO: 9.5 % (ref 4–12)
NEUTROPHILS # BLD AUTO: 4.85 10*3/MM3 (ref 1.87–8.4)
NEUTROPHILS NFR BLD AUTO: 68.7 % (ref 39–78)
PLATELET # BLD AUTO: 225 10*3/MM3 (ref 130–400)
PMV BLD AUTO: 10.8 FL (ref 6–12)
POTASSIUM BLD-SCNC: 3.4 MMOL/L (ref 3.5–5.3)
PROT SERPL-MCNC: 6.7 G/DL (ref 6.3–8.7)
PROTHROMBIN TIME: 20.6 SECONDS (ref 11.9–14.6)
RBC # BLD AUTO: 4.32 10*6/MM3 (ref 4.2–5.4)
SODIUM BLD-SCNC: 142 MMOL/L (ref 135–145)
TSH SERPL DL<=0.05 MIU/L-ACNC: 0.3 MIU/ML (ref 0.47–4.68)
WBC NRBC COR # BLD: 7.06 10*3/MM3 (ref 4.8–10.8)

## 2017-09-05 PROCEDURE — 85025 COMPLETE CBC W/AUTO DIFF WBC: CPT | Performed by: INTERNAL MEDICINE

## 2017-09-05 PROCEDURE — 84443 ASSAY THYROID STIM HORMONE: CPT | Performed by: INTERNAL MEDICINE

## 2017-09-05 PROCEDURE — 80053 COMPREHEN METABOLIC PANEL: CPT | Performed by: INTERNAL MEDICINE

## 2017-09-05 PROCEDURE — 85610 PROTHROMBIN TIME: CPT | Performed by: INTERNAL MEDICINE

## 2017-09-18 ENCOUNTER — TELEPHONE (OUTPATIENT)
Dept: INTERNAL MEDICINE | Age: 82
End: 2017-09-18

## 2017-09-18 ENCOUNTER — LAB REQUISITION (OUTPATIENT)
Dept: LAB | Facility: HOSPITAL | Age: 82
End: 2017-09-18

## 2017-09-18 DIAGNOSIS — Z00.00 ENCOUNTER FOR GENERAL ADULT MEDICAL EXAMINATION WITHOUT ABNORMAL FINDINGS: ICD-10-CM

## 2017-09-18 LAB
INR PPP: 1.3 (ref 0.8–1.2)
PROTHROMBIN TIME: 15.8 SECONDS (ref 12.8–15.2)

## 2017-09-18 PROCEDURE — 85610 PROTHROMBIN TIME: CPT | Performed by: INTERNAL MEDICINE

## 2017-09-18 NOTE — TELEPHONE ENCOUNTER
Her numbers fluctuate a lot- take 5mg today and then go back to 2.5mg a day and repeat in coumadin clinic in 3-4 weeks

## 2017-10-02 RX ORDER — WARFARIN SODIUM 5 MG/1
TABLET ORAL
Qty: 90 TABLET | Refills: 3 | Status: SHIPPED | OUTPATIENT
Start: 2017-10-02 | End: 2018-12-23 | Stop reason: SDUPTHER

## 2017-10-05 ENCOUNTER — OFFICE VISIT (OUTPATIENT)
Dept: INTERNAL MEDICINE | Age: 82
End: 2017-10-05
Payer: MEDICARE

## 2017-10-05 ENCOUNTER — HOSPITAL ENCOUNTER (OUTPATIENT)
Dept: GENERAL RADIOLOGY | Age: 82
Discharge: HOME | End: 2017-10-05
Payer: MEDICARE

## 2017-10-05 VITALS
DIASTOLIC BLOOD PRESSURE: 84 MMHG | OXYGEN SATURATION: 95 % | HEIGHT: 62 IN | BODY MASS INDEX: 23.46 KG/M2 | TEMPERATURE: 98.2 F | HEART RATE: 70 BPM | WEIGHT: 127.5 LBS | SYSTOLIC BLOOD PRESSURE: 120 MMHG

## 2017-10-05 DIAGNOSIS — D64.9 ANEMIA, UNSPECIFIED TYPE: ICD-10-CM

## 2017-10-05 DIAGNOSIS — L98.9 BENIGN SKIN LESION: ICD-10-CM

## 2017-10-05 DIAGNOSIS — K44.9 HIATAL HERNIA: ICD-10-CM

## 2017-10-05 DIAGNOSIS — M25.571 ACUTE RIGHT ANKLE PAIN: Primary | ICD-10-CM

## 2017-10-05 DIAGNOSIS — D12.6 ADENOMATOUS POLYP OF COLON, UNSPECIFIED PART OF COLON: ICD-10-CM

## 2017-10-05 DIAGNOSIS — K64.9 HEMORRHOIDS, UNSPECIFIED HEMORRHOID TYPE: ICD-10-CM

## 2017-10-05 DIAGNOSIS — M25.571 ACUTE RIGHT ANKLE PAIN: ICD-10-CM

## 2017-10-05 DIAGNOSIS — M15.9 OSTEOARTHROSIS, GENERALIZED, MULTIPLE JOINTS: ICD-10-CM

## 2017-10-05 DIAGNOSIS — F32.A DEPRESSION, UNSPECIFIED DEPRESSION TYPE: ICD-10-CM

## 2017-10-05 DIAGNOSIS — I51.89 DIASTOLIC DYSFUNCTION: ICD-10-CM

## 2017-10-05 DIAGNOSIS — R60.9 EDEMA, UNSPECIFIED TYPE: ICD-10-CM

## 2017-10-05 DIAGNOSIS — R04.2 HEMOPTYSIS: ICD-10-CM

## 2017-10-05 DIAGNOSIS — R26.89 ANTALGIC GAIT: ICD-10-CM

## 2017-10-05 DIAGNOSIS — H69.80 EUSTACHIAN TUBE DYSFUNCTION, UNSPECIFIED LATERALITY: ICD-10-CM

## 2017-10-05 DIAGNOSIS — R53.83 FATIGUE, UNSPECIFIED TYPE: ICD-10-CM

## 2017-10-05 DIAGNOSIS — D47.3 ESSENTIAL THROMBOCYTOSIS (HCC): ICD-10-CM

## 2017-10-05 DIAGNOSIS — H61.23 IMPACTED CERUMEN OF BOTH EARS: ICD-10-CM

## 2017-10-05 DIAGNOSIS — Z23 NEED FOR TETANUS BOOSTER: ICD-10-CM

## 2017-10-05 DIAGNOSIS — R73.01 IFG (IMPAIRED FASTING GLUCOSE): ICD-10-CM

## 2017-10-05 DIAGNOSIS — I95.9 HYPOTENSION, UNSPECIFIED HYPOTENSION TYPE: ICD-10-CM

## 2017-10-05 DIAGNOSIS — M51.36 DISC DEGENERATION, LUMBAR: ICD-10-CM

## 2017-10-05 DIAGNOSIS — M19.90 ARTHRITIS: ICD-10-CM

## 2017-10-05 DIAGNOSIS — F41.9 ANXIETY: ICD-10-CM

## 2017-10-05 DIAGNOSIS — R73.9 HYPERGLYCEMIA: ICD-10-CM

## 2017-10-05 PROBLEM — E03.9 HYPOACTIVE THYROID: Status: ACTIVE | Noted: 2017-06-26

## 2017-10-05 PROBLEM — E11.9 TYPE 2 DIABETES MELLITUS (HCC): Status: ACTIVE | Noted: 2017-10-05

## 2017-10-05 PROBLEM — Z95.0 PRESENCE OF CARDIAC PACEMAKER: Status: ACTIVE | Noted: 2017-06-26

## 2017-10-05 PROBLEM — I34.0 MITRAL VALVE INSUFFICIENCY: Status: ACTIVE | Noted: 2017-06-26

## 2017-10-05 PROBLEM — E78.2 COMBINED FAT AND CARBOHYDRATE INDUCED HYPERLIPEMIA: Status: ACTIVE | Noted: 2017-06-26

## 2017-10-05 PROBLEM — I49.5 SICK SINUS SYNDROME (HCC): Status: ACTIVE | Noted: 2017-06-26

## 2017-10-05 PROCEDURE — 1036F TOBACCO NON-USER: CPT | Performed by: PHYSICIAN ASSISTANT

## 2017-10-05 PROCEDURE — G8484 FLU IMMUNIZE NO ADMIN: HCPCS | Performed by: PHYSICIAN ASSISTANT

## 2017-10-05 PROCEDURE — G8420 CALC BMI NORM PARAMETERS: HCPCS | Performed by: PHYSICIAN ASSISTANT

## 2017-10-05 PROCEDURE — 1090F PRES/ABSN URINE INCON ASSESS: CPT | Performed by: PHYSICIAN ASSISTANT

## 2017-10-05 PROCEDURE — G8427 DOCREV CUR MEDS BY ELIG CLIN: HCPCS | Performed by: PHYSICIAN ASSISTANT

## 2017-10-05 PROCEDURE — 4040F PNEUMOC VAC/ADMIN/RCVD: CPT | Performed by: PHYSICIAN ASSISTANT

## 2017-10-05 PROCEDURE — 99213 OFFICE O/P EST LOW 20 MIN: CPT | Performed by: PHYSICIAN ASSISTANT

## 2017-10-05 PROCEDURE — 73610 X-RAY EXAM OF ANKLE: CPT

## 2017-10-05 PROCEDURE — G8598 ASA/ANTIPLAT THER USED: HCPCS | Performed by: PHYSICIAN ASSISTANT

## 2017-10-05 PROCEDURE — 1123F ACP DISCUSS/DSCN MKR DOCD: CPT | Performed by: PHYSICIAN ASSISTANT

## 2017-10-05 ASSESSMENT — ENCOUNTER SYMPTOMS
PHOTOPHOBIA: 0
DIARRHEA: 0
COLOR CHANGE: 0
EYE REDNESS: 0
COUGH: 0
SINUS PRESSURE: 0
SORE THROAT: 0
RHINORRHEA: 0
WHEEZING: 0
CONSTIPATION: 0
SHORTNESS OF BREATH: 0
VOMITING: 0
CHEST TIGHTNESS: 0
ABDOMINAL PAIN: 0
EYE PAIN: 0
NAUSEA: 0

## 2017-10-05 NOTE — MR AVS SNAPSHOT
After Visit Summary             Juan Moreno   10/5/2017 1:15 PM   Office Visit    Description:  Female : 1930   Provider:  GOLDEN Dowling   Department:  [facility]              Your Follow-Up and Future Appointments         Below is a list of your follow-up and future appointments. This may not be a complete list as you may have made appointments directly with providers that we are not aware of or your providers may have made some for you. Please call your providers to confirm appointments. It is important to keep your appointments. Please bring your current insurance card, photo ID, co-pay, and all medication bottles to your appointment. If self-pay, payment is expected at the time of service. Your To-Do List     Future Appointments Provider Department Dept Phone    2017 1:15 PM Dawn Morgan MD [facility] [phone]    Please arrive 15 minutes prior to appointment time, bring insurance card and photo ID.      Future Orders Complete By Expires    XR ANKLE RIGHT (MIN 3 VIEWS) [[id number]]  10/5/2017 10/5/2018         Information from Your Visit        Department     Name Address Phone Fax    [facility] [address] [place] [address] [phone]      You Were Seen for:         Comments    Acute right ankle pain   [[id number]]         Vital Signs     Blood Pressure Pulse Temperature Height Weight Oxygen Saturation    120/84 70 98.2 °F (36.8 °C) 5' 2\" (1.575 m) 127 lb 8 oz (57.8 kg) 95%    Body Mass Index Smoking Status                23.32 kg/m2 Former Smoker             Medications and Orders      Your Current Medications Are              warfarin (COUMADIN) 5 MG tablet TAKE 1 TABLET ALTERNATE WITH ONE-HALF (1/2) TABLET EVERY OTHER DAY    escitalopram (LEXAPRO) 10 MG tablet TAKE ONE-HALF (1/2) TABLET DAILY    furosemide (LASIX) 40 MG tablet Take 1.5 tablets by mouth daily furosemide (LASIX) 40 MG tablet One and a 1/2 tablets q am    simvastatin (ZOCOR) 10 MG tablet TAKE 1 TABLET DAILY AT BEDTIME    spironolactone (ALDACTONE) 25 MG tablet Take 25 mg by mouth daily    potassium chloride (KLOR-CON M) 20 MEQ extended release tablet Take 1 tablet by mouth daily    aspirin 81 MG tablet Take 81 mg by mouth daily    calcium carbonate (OSCAL) 500 MG TABS tablet Take 500 mg by mouth 2 times daily    carvedilol (COREG) 12.5 MG tablet Take 12.5 mg by mouth 2 times daily (with meals)    digoxin (LANOXIN) 125 MCG tablet Take 125 mcg by mouth every other day     escitalopram (LEXAPRO) 5 MG tablet Take 5 mg by mouth daily    omeprazole (PRILOSEC) 20 MG delayed release capsule Take 40 mg by mouth daily    levothyroxine (SYNTHROID) 75 MCG tablet Take 75 mcg by mouth Daily Indications: except none on sunday     nitroGLYCERIN (NITROSTAT) 0.4 MG SL tablet Place 0.4 mg under the tongue as needed.         Allergies           No Known Allergies         Additional Information        Basic Information     Date Of Birth Sex Race Ethnicity Preferred Language    12/28/1930 Female White Non-/Non  English      Problem List as of 10/5/2017  Date Reviewed: 10/5/2017                Adenomatous polyp of colon    Anemia    Anxiety    Arthritis    Benign skin lesion    Depression    Diastolic dysfunction    Disc degeneration, lumbar    Edema    Essential thrombocytosis (HCC)    Eustachian tube dysfunction    Fatigue    Osteoarthrosis, generalized, multiple joints    Hemoptysis    Hemorrhoids    Hiatal hernia    Hyperglycemia    Hypotension    Impacted cerumen of both ears    IFG (impaired fasting glucose)    Type 2 diabetes mellitus (HCC)    Localized edema    Chronic combined systolic and diastolic congestive heart failure (HCC)    Hypoactive thyroid    Mitral valve insufficiency    Combined fat and carbohydrate induced hyperlipemia    Presence of cardiac pacemaker    Sick sinus syndrome ([facility] [address] If you have questions, please contact the physician practice where you receive care. Remember, MyChart is NOT to be used for urgent needs. For medical emergencies, dial 911. For questions regarding your [employer] account call [phone]. If you have a clinical question, please call your doctor's office.

## 2017-10-05 NOTE — PROGRESS NOTES
tube dysfunction 10/05/2017    Fatigue 10/05/2017    Osteoarthrosis, generalized, multiple joints 10/05/2017    Hemoptysis 10/05/2017    Hemorrhoids 10/05/2017    Hiatal hernia 10/05/2017    Hyperglycemia 10/05/2017    Hypotension 10/05/2017    Impacted cerumen of both ears 10/05/2017    IFG (impaired fasting glucose) 10/05/2017    Type 2 diabetes mellitus ([facility] [address] 10/05/2017     Resolved Ambulatory Problems     Diagnosis Date Noted    No Resolved Ambulatory Problems     Past Medical History:   Diagnosis Date    Atrial fibrillation ([address]     CAD in native artery     Carcinoma of tonsil (HCC)     Chronic combined systolic and diastolic congestive heart failure ([facility] [address] 8/7/2017    GERD (gastroesophageal reflux disease)     History of colonic polyps     Hypercholesterolemia     Hyperlipidemia     Hypertension        Past Medical History:   Diagnosis Date    Atrial fibrillation ([address]     CAD in native artery     s/p stents    Carcinoma of tonsil (HCC)     Chronic combined systolic and diastolic congestive heart failure ([facility] [address] 8/7/2017    GERD (gastroesophageal reflux disease)     History of colonic polyps     Hypercholesterolemia     Hyperlipidemia     Hypertension        Prior to Visit Medications    Medication Sig Taking?  Authorizing Provider   warfarin (COUMADIN) 5 MG tablet TAKE 1 TABLET ALTERNATE WITH ONE-HALF (1/2) TABLET EVERY OTHER DAY Yes Dawn Morgan MD   escitalopram (LEXAPRO) 10 MG tablet TAKE ONE-HALF (1/2) TABLET DAILY Yes Dawn Morgan MD   furosemide (LASIX) 40 MG tablet Take 1.5 tablets by mouth daily Yes Dawn Morgan MD   furosemide (LASIX) 40 MG tablet One and a 1/2 tablets q am Yes Dawn Morgan MD   simvastatin (ZOCOR) 10 MG tablet TAKE 1 TABLET DAILY AT BEDTIME Yes Dawn Morgan MD   spironolactone (ALDACTONE) 25 MG tablet Take 25 mg by mouth daily Yes Historical Provider, MD   potassium chloride (KLOR-CON M) 20 MEQ extended release tablet Take 1 tablet by mouth daily Yes release capsule Take 40 mg by mouth daily      levothyroxine (SYNTHROID) 75 MCG tablet Take 75 mcg by mouth Daily Indications: except none on sunday       nitroGLYCERIN (NITROSTAT) 0.4 MG SL tablet Place 0.4 mg under the tongue as needed. No current facility-administered medications for this visit. /84  Pulse 70  Temp 98.2 °F (36.8 °C)  Ht 5' 2\" (1.575 m)  Wt 127 lb 8 oz (57.8 kg)  SpO2 95%  BMI 23.32 kg/m2    PHYSICAL EXAM  Physical Exam   Constitutional: Vital signs are normal. She appears well-developed and well-nourished. HENT:   Head: Normocephalic and atraumatic. Right Ear: External ear normal.   Left Ear: External ear normal.   Nose: Nose normal.   Eyes: Pupils are equal, round, and reactive to light. Right eye exhibits no discharge. Left eye exhibits no discharge. Neck: Normal range of motion. No tracheal deviation present. Cardiovascular: Normal rate, regular rhythm and normal heart sounds. Exam reveals no gallop and no friction rub. No murmur heard. Pulmonary/Chest: Effort normal and breath sounds normal. No respiratory distress. She has no wheezes. She has no rales. She exhibits no tenderness. Abdominal: Soft. There is no tenderness. There is no rebound and no guarding. Musculoskeletal: Normal range of motion. She exhibits no deformity. Right ankle: She exhibits swelling. She exhibits normal range of motion, no ecchymosis and no deformity. Tenderness (anterior ankle and around to the Achilles tendon. No discoloration or bruising noted. No fever around the area. ). Lymphadenopathy:     She has no cervical adenopathy. Neurological: She is alert. Skin: Skin is warm, dry and intact. No lesion and no rash noted. No erythema. Psychiatric: She has a normal mood and affect. Her behavior is normal. Judgment and thought content normal. Her mood appears not anxious. She does not exhibit a depressed mood. Nursing note and vitals reviewed.     Narrative EXAMINATION: XR ANKLE RIGHT STANDARD 10/5/2017 2:33 PM   HISTORY: Acute right ankle pain. Report: 3 views of the right ankle were obtained. There are no   comparison studies. Bony alignment is normal, no fractures identified. The joint spaces   are preserved. There is extensive circumferential soft tissue   swelling.       Impression   No fracture or acute osseous and abnormality. Extensive   circumferential soft tissue swelling is present. Signed by Dr Minor Murillo. Ancasurya on 10/5/2017 2:34 PM               ASSESSMENT      ICD-10-CM ICD-9-CM    1. Acute right ankle pain M25.571 719.47 XR ANKLE RIGHT (MIN 3 VIEWS)     338.19    2. Antalgic gait R26.89 781.2    3. Need for tetanus booster Z23 V03.7 Tdap (age 10y-63y) IM (ADACEL)       PLAN  Discussed with patient and family that we can do an x-ray on the ankle to evaluate for any bony abnormality. Do not see any erythema or bruising to indicate injury or blood clot . Discussed rest, ice, elevation, compression. Patient's on chronic Coumadin so she shouldn't be taken NSAID. Patient can take high-dose Tylenol as needed for pain. Patient follow-up as needed for increased pain, swelling, fever, deformity, locking, popping, giving out, not able to walk or as needed if not improving. Patient states does not want shingles shot. Orders Placed This Encounter   Procedures    XR ANKLE RIGHT (MIN 3 VIEWS)    Tdap (age 10y-63y) IM (ADACEL)        Return if symptoms worsen or fail to improve. All questions answered. Patient voices understanding and agrees to plans along with risks and benefits of plan. Patient is instructed to continue prior medications, diet, and exercise plans as instructed. Patient agrees to follow up as instructions, sooner if needed, or to go to ER if condition becomes emergent.               Additional Instructions: As always, patient is advised to bring in medication bottles in order to correctly reconcile with our current list.      Fede Riley/transcription disclaimer: Much of this encounter note is electronic transcription/translation of spoken language to printed text. The electronic translation of spoken language may be erroneous, or at times, nonsensical words or phrases may be inadvertently transcribed.  Although I have reviewed the note for such errors, some may still exist.

## 2017-10-27 DIAGNOSIS — I50.42 CHRONIC COMBINED SYSTOLIC AND DIASTOLIC CONGESTIVE HEART FAILURE (HCC): ICD-10-CM

## 2017-10-27 RX ORDER — FUROSEMIDE 40 MG/1
60 TABLET ORAL DAILY
Qty: 135 TABLET | Refills: 3 | Status: SHIPPED | OUTPATIENT
Start: 2017-10-27 | End: 2018-06-28 | Stop reason: SDUPTHER

## 2017-11-09 RX ORDER — OMEPRAZOLE 20 MG/1
40 CAPSULE, DELAYED RELEASE ORAL DAILY
Qty: 180 CAPSULE | Refills: 3 | Status: SHIPPED | OUTPATIENT
Start: 2017-11-09 | End: 2018-10-17 | Stop reason: SDUPTHER

## 2017-11-30 ENCOUNTER — OFFICE VISIT (OUTPATIENT)
Dept: INTERNAL MEDICINE | Age: 82
End: 2017-11-30
Payer: MEDICARE

## 2017-11-30 ENCOUNTER — HOSPITAL ENCOUNTER (OUTPATIENT)
Dept: GENERAL RADIOLOGY | Age: 82
Discharge: HOME | End: 2017-11-30
Payer: MEDICARE

## 2017-11-30 VITALS
HEART RATE: 70 BPM | BODY MASS INDEX: 23.78 KG/M2 | WEIGHT: 130 LBS | OXYGEN SATURATION: 93 % | DIASTOLIC BLOOD PRESSURE: 64 MMHG | SYSTOLIC BLOOD PRESSURE: 110 MMHG

## 2017-11-30 DIAGNOSIS — J20.9 BRONCHITIS WITH BRONCHOSPASM: ICD-10-CM

## 2017-11-30 DIAGNOSIS — I50.42 CHRONIC COMBINED SYSTOLIC AND DIASTOLIC CONGESTIVE HEART FAILURE (HCC): ICD-10-CM

## 2017-11-30 DIAGNOSIS — R73.01 IMPAIRED FASTING GLUCOSE: ICD-10-CM

## 2017-11-30 DIAGNOSIS — I10 ESSENTIAL HYPERTENSION: ICD-10-CM

## 2017-11-30 DIAGNOSIS — J20.9 BRONCHITIS WITH BRONCHOSPASM: Primary | ICD-10-CM

## 2017-11-30 DIAGNOSIS — E03.9 ACQUIRED HYPOTHYROIDISM: ICD-10-CM

## 2017-11-30 PROBLEM — J06.9 URI (UPPER RESPIRATORY INFECTION): Status: ACTIVE | Noted: 2017-11-30

## 2017-11-30 PROBLEM — E11.9 TYPE 2 DIABETES MELLITUS (HCC): Status: RESOLVED | Noted: 2017-10-05 | Resolved: 2017-11-30

## 2017-11-30 PROCEDURE — G8427 DOCREV CUR MEDS BY ELIG CLIN: HCPCS | Performed by: INTERNAL MEDICINE

## 2017-11-30 PROCEDURE — G8598 ASA/ANTIPLAT THER USED: HCPCS | Performed by: INTERNAL MEDICINE

## 2017-11-30 PROCEDURE — 4040F PNEUMOC VAC/ADMIN/RCVD: CPT | Performed by: INTERNAL MEDICINE

## 2017-11-30 PROCEDURE — G8420 CALC BMI NORM PARAMETERS: HCPCS | Performed by: INTERNAL MEDICINE

## 2017-11-30 PROCEDURE — 71020 XR CHEST STANDARD TWO VW: CPT

## 2017-11-30 PROCEDURE — 99214 OFFICE O/P EST MOD 30 MIN: CPT | Performed by: INTERNAL MEDICINE

## 2017-11-30 PROCEDURE — 1036F TOBACCO NON-USER: CPT | Performed by: INTERNAL MEDICINE

## 2017-11-30 PROCEDURE — G8484 FLU IMMUNIZE NO ADMIN: HCPCS | Performed by: INTERNAL MEDICINE

## 2017-11-30 PROCEDURE — 1090F PRES/ABSN URINE INCON ASSESS: CPT | Performed by: INTERNAL MEDICINE

## 2017-11-30 PROCEDURE — 1123F ACP DISCUSS/DSCN MKR DOCD: CPT | Performed by: INTERNAL MEDICINE

## 2017-11-30 PROCEDURE — 96372 THER/PROPH/DIAG INJ SC/IM: CPT | Performed by: INTERNAL MEDICINE

## 2017-11-30 RX ORDER — METHYLPREDNISOLONE ACETATE 40 MG/ML
40 INJECTION, SUSPENSION INTRA-ARTICULAR; INTRALESIONAL; INTRAMUSCULAR; SOFT TISSUE ONCE
Status: COMPLETED | OUTPATIENT
Start: 2017-11-30 | End: 2017-11-30

## 2017-11-30 RX ORDER — CEFDINIR 300 MG/1
300 CAPSULE ORAL 2 TIMES DAILY
Qty: 20 CAPSULE | Refills: 0 | Status: SHIPPED | OUTPATIENT
Start: 2017-11-30 | End: 2017-12-10

## 2017-11-30 RX ADMIN — METHYLPREDNISOLONE ACETATE 40 MG: 40 INJECTION, SUSPENSION INTRA-ARTICULAR; INTRALESIONAL; INTRAMUSCULAR; SOFT TISSUE at 12:51

## 2017-11-30 NOTE — PROGRESS NOTES
Chief Complaint   Patient presents with    Wheezing    Cough       HPI: Patient is here today for a problem visit wheezing coughing not feeling well no fever but she says she has yellow sputum she feels like it's an infection no edema no orthopnea she has a lot of coughing. Patient seems weak and fragile on exam.    Past Medical History:   Diagnosis Date    Atrial fibrillation ([facility] [address]     CAD in native artery     s/p stents    Carcinoma of tonsil (HCC)     Chronic combined systolic and diastolic congestive heart failure ([facility] [address] 8/7/2017    GERD (gastroesophageal reflux disease)     History of colonic polyps     Hypercholesterolemia     Hyperlipidemia     Hypertension        Past Surgical History:   Procedure Laterality Date    CHOLECYSTECTOMY      TONSILLECTOMY         Family History   Problem Relation Age of Onset    Coronary Art Dis Mother     Hypertension Mother     Cancer Sister     Hypertension Other        Social History     Social History    Marital status:      Spouse name: N/A    Number of children: N/A    Years of education: N/A     Occupational History    Not on file.      Social History Main Topics    Smoking status: Former Smoker    Smokeless tobacco: Never Used    Alcohol use No    Drug use: Unknown    Sexual activity: Not on file     Other Topics Concern    Not on file     Social History Narrative    No narrative on file       No Known Allergies    Current Outpatient Prescriptions   Medication Sig Dispense Refill    cefdinir (OMNICEF) 300 MG capsule Take 1 capsule by mouth 2 times daily for 10 days 20 capsule 0    omeprazole (PRILOSEC) 20 MG delayed release capsule Take 2 capsules by mouth daily 180 capsule 3    furosemide (LASIX) 40 MG tablet Take 1.5 tablets by mouth daily 135 tablet 3    warfarin (COUMADIN) 5 MG tablet TAKE 1 TABLET ALTERNATE WITH ONE-HALF (1/2) TABLET EVERY OTHER DAY 90 tablet 3    escitalopram (LEXAPRO) 10 MG tablet TAKE ONE-HALF (1/2) TABLET DAILY 135 tablet 3    simvastatin (ZOCOR) 10 MG tablet TAKE 1 TABLET DAILY AT BEDTIME 90 tablet 3    spironolactone (ALDACTONE) 25 MG tablet Take 25 mg by mouth daily      potassium chloride (KLOR-CON M) 20 MEQ extended release tablet Take 1 tablet by mouth daily 30 tablet 5    aspirin 81 MG tablet Take 81 mg by mouth daily      calcium carbonate (OSCAL) 500 MG TABS tablet Take 500 mg by mouth 2 times daily      carvedilol (COREG) 12.5 MG tablet Take 12.5 mg by mouth 2 times daily (with meals)      digoxin (LANOXIN) 125 MCG tablet Take 125 mcg by mouth every other day       escitalopram (LEXAPRO) 5 MG tablet Take 5 mg by mouth daily      levothyroxine (SYNTHROID) 75 MCG tablet Take 75 mcg by mouth Daily Indications: except none on sunday       nitroGLYCERIN (NITROSTAT) 0.4 MG SL tablet Place 0.4 mg under the tongue as needed. Current Facility-Administered Medications   Medication Dose Route Frequency Provider Last Rate Last Dose    methylPREDNISolone acetate (DEPO-MEDROL) injection 40 mg  40 mg Intramuscular Once Samuel Davila MD           Review of Systems no chest pressure or palpitations. No nausea vomiting or diarrhea. Really denies much issue with arthralgias. No pitting edema. No headaches or sore throat. A little bit of sinus congestion and runny nose. /64   Pulse 70   Wt 130 lb (59 kg)   SpO2 93%   BMI 23.78 kg/m²     Physical Exam sclera anicteric TMs are dull patient is weak little more trouble getting on the exam table than usual appears fragile frail heart S1-S2 lungs are clear but she coughs with some upper airway sounds. Upper bronchospasm but clear throughout her lungs. Extremities with nonpitting edema mood is okay very tired. Thing during the exam some. ASSESSMENT/ PLAN:  1.  Bronchitis with bronchospasm  cefdinir (OMNICEF) 300 MG capsule    methylPREDNISolone acetate (DEPO-MEDROL) injection 40 mg  Patient is fragile history of volume overload issues with chronic heart failure also. We are going to treat this is infection but they're aware to let us know if any change in symptoms were going to order home health to help out with things keep an eye on her work with her physical therapy monitor her INR and monitor her cardiopulmonary status. Check labs while she is here today as this may change her plan of care. If she worsens they know to go to the emergency room. Or to call. CBC    Comprehensive Metabolic Panel   2. Essential hypertension  Stable    3. Acquired hypothyroidism  TSH without Reflex   4. Impaired fasting glucose  Hemoglobin A1C   5. Chronic combined systolic and diastolic congestive heart failure (HCC)  Brain Natriuretic Peptide  Check a BNP with her history but she seems if anything a little dry on exam. She does not appear volume overloaded and there are no rails. Patient has a fragile status they know that if she worsens to let us know.

## 2017-12-05 ENCOUNTER — TELEPHONE (OUTPATIENT)
Dept: INTERNAL MEDICINE | Age: 82
End: 2017-12-05

## 2017-12-05 NOTE — TELEPHONE ENCOUNTER
Notified pt. Of take 5mg daily except 1/2 tab on Sunday.   Will call [facility] tomorrow

## 2017-12-11 ENCOUNTER — TELEPHONE (OUTPATIENT)
Dept: INTERNAL MEDICINE | Age: 82
End: 2017-12-11

## 2017-12-11 ENCOUNTER — LAB REQUISITION (OUTPATIENT)
Dept: LAB | Facility: HOSPITAL | Age: 82
End: 2017-12-11

## 2017-12-11 DIAGNOSIS — Z00.00 ENCOUNTER FOR GENERAL ADULT MEDICAL EXAMINATION WITHOUT ABNORMAL FINDINGS: ICD-10-CM

## 2017-12-11 LAB
INR PPP: 2.6 (ref 0.8–1.2)
PROTHROMBIN TIME: 31.1 SECONDS (ref 12.8–15.2)

## 2017-12-11 PROCEDURE — 85610 PROTHROMBIN TIME: CPT | Performed by: INTERNAL MEDICINE

## 2017-12-18 ENCOUNTER — LAB REQUISITION (OUTPATIENT)
Dept: LAB | Facility: HOSPITAL | Age: 82
End: 2017-12-18

## 2017-12-18 ENCOUNTER — TELEPHONE (OUTPATIENT)
Dept: INTERNAL MEDICINE | Age: 82
End: 2017-12-18

## 2017-12-18 DIAGNOSIS — Z00.00 ENCOUNTER FOR GENERAL ADULT MEDICAL EXAMINATION WITHOUT ABNORMAL FINDINGS: ICD-10-CM

## 2017-12-18 LAB
INR PPP: 3.68 (ref 0.91–1.09)
PROTHROMBIN TIME: 38 SECONDS (ref 11.9–14.6)

## 2017-12-18 PROCEDURE — 85610 PROTHROMBIN TIME: CPT | Performed by: INTERNAL MEDICINE

## 2017-12-20 ENCOUNTER — TELEPHONE (OUTPATIENT)
Dept: INTERNAL MEDICINE | Age: 82
End: 2017-12-20

## 2017-12-20 ENCOUNTER — LAB REQUISITION (OUTPATIENT)
Dept: LAB | Facility: HOSPITAL | Age: 82
End: 2017-12-20

## 2017-12-20 DIAGNOSIS — Z00.00 ENCOUNTER FOR GENERAL ADULT MEDICAL EXAMINATION WITHOUT ABNORMAL FINDINGS: ICD-10-CM

## 2017-12-20 LAB
INR PPP: 2.9 (ref 0.8–1.2)
PROTHROMBIN TIME: 34.7 SECONDS (ref 12.8–15.2)

## 2017-12-20 PROCEDURE — 85610 PROTHROMBIN TIME: CPT | Performed by: INTERNAL MEDICINE

## 2017-12-20 NOTE — TELEPHONE ENCOUNTER
Coumadin was held x 2 days. INR 2.9 today - advised to go to 2.5mg on wed, Sunday and 5mg other 5 days and will recheck again next wed. Physical therapy has discharged her and HomeHealth may discharge her in next week or so if stable.

## 2017-12-27 ENCOUNTER — LAB REQUISITION (OUTPATIENT)
Dept: LAB | Facility: HOSPITAL | Age: 82
End: 2017-12-27

## 2017-12-27 ENCOUNTER — TELEPHONE (OUTPATIENT)
Dept: INTERNAL MEDICINE | Age: 82
End: 2017-12-27

## 2017-12-27 DIAGNOSIS — Z00.00 ENCOUNTER FOR GENERAL ADULT MEDICAL EXAMINATION WITHOUT ABNORMAL FINDINGS: ICD-10-CM

## 2017-12-27 LAB
INR PPP: 3.6 (ref 0.8–1.2)
PROTHROMBIN TIME: 43.3 SECONDS (ref 12.8–15.2)

## 2017-12-27 PROCEDURE — 85610 PROTHROMBIN TIME: CPT | Performed by: INTERNAL MEDICINE

## 2018-01-04 ENCOUNTER — LAB REQUISITION (OUTPATIENT)
Dept: LAB | Facility: HOSPITAL | Age: 83
End: 2018-01-04

## 2018-01-04 ENCOUNTER — TELEPHONE (OUTPATIENT)
Dept: INTERNAL MEDICINE | Age: 83
End: 2018-01-04

## 2018-01-04 DIAGNOSIS — Z00.00 ENCOUNTER FOR GENERAL ADULT MEDICAL EXAMINATION WITHOUT ABNORMAL FINDINGS: ICD-10-CM

## 2018-01-04 PROCEDURE — 85610 PROTHROMBIN TIME: CPT | Performed by: INTERNAL MEDICINE

## 2018-01-05 LAB
INR PPP: 3.2 (ref 0.8–1.2)
PROTHROMBIN TIME: 38 SECONDS (ref 12.8–15.2)

## 2018-01-11 ENCOUNTER — LAB REQUISITION (OUTPATIENT)
Dept: LAB | Facility: HOSPITAL | Age: 83
End: 2018-01-11

## 2018-01-11 ENCOUNTER — TELEPHONE (OUTPATIENT)
Dept: INTERNAL MEDICINE | Age: 83
End: 2018-01-11

## 2018-01-11 DIAGNOSIS — Z00.00 ENCOUNTER FOR GENERAL ADULT MEDICAL EXAMINATION WITHOUT ABNORMAL FINDINGS: ICD-10-CM

## 2018-01-11 LAB
INR PPP: 2.6 (ref 0.8–1.2)
PROTHROMBIN TIME: 31.7 SECONDS (ref 12.8–15.2)

## 2018-01-11 PROCEDURE — 85610 PROTHROMBIN TIME: CPT | Performed by: INTERNAL MEDICINE

## 2018-02-08 ENCOUNTER — NURSE ONLY (OUTPATIENT)
Dept: INTERNAL MEDICINE | Age: 83
End: 2018-02-08
Payer: MEDICARE

## 2018-02-08 ENCOUNTER — ANTI-COAG VISIT (OUTPATIENT)
Dept: INTERNAL MEDICINE | Age: 83
End: 2018-02-08

## 2018-02-08 DIAGNOSIS — Z79.01 LONG TERM CURRENT USE OF ANTICOAGULANT THERAPY: Primary | ICD-10-CM

## 2018-02-08 LAB
INTERNATIONAL NORMALIZATION RATIO, POC: 3.4
PROTHROMBIN TIME, POC: NORMAL

## 2018-02-08 PROCEDURE — 85610 PROTHROMBIN TIME: CPT | Performed by: INTERNAL MEDICINE

## 2018-02-08 NOTE — PROGRESS NOTES
Ms. Álvaro Padilla was here today. INR today: 3.4       INR Goal: 2.0-3.0    Dosing Plan  As of 2/8/2018    TTR:   --   Full instructions:   2/8: Hold; Otherwise 5 mg on Tue, Thu, Sat; 2.5 mg all other days                 PLAN: HOLD TONIGHT, THEN RESUME SAME DOSE    NEXT COUMADIN CLINIC APT IS: 02/15/18 @ 11:15AM        [facility] INTERNAL MEDICINE COUMADIN CLINIC  [phone]    [place] EFFECTS -- The major complication associated with warfarin is bleeding due to excessive anticoagulation. Excessive bleeding, or hemorrhage, can occur from any area of the body, and patients on warfarin should report any falls or accidents, as well as signs or symptoms of bleeding or unusual bruising. Signs of unusual bleeding include bleeding from the gums, blood in the urine, bloody or dark stool, a nosebleed, or vomiting blood. Because the risk of bleeding increases as the INR rises, the INR is closely monitored and adjustments are made as needed to maintain the INR within the target range. Monique Huge also cause skin necrosis or gangrene, which can cause dark red or black areas on the skin. This is a rare complication that may occur during the first several days of warfarin therapy. When to seek help -- If there are obvious or subtle signs of bleeding, including the following, patients should call their healthcare provider immediately.   · Persistent nausea, stomach upset, or vomiting blood or other material that looks like coffee grounds   · Headaches, dizziness, or weakness   · Nosebleeds   · Dark red or brown urine   · Blood in the bowel movement or dark-colored stool   · Pain, discomfort, or swelling, especially after an injury   · After a serious fall or head injury, even if there are no other symptoms  The patient should also call if any of the following occurs:  · Bleeding from the gums after brushing the teeth   · Swelling or pain at an injection site   · Excessive menstrual bleeding or bleeding between unfamiliar areas outside. Warfarin and food -- Some foods and supplements can interfere with warfarin's effectiveness. After being stabilized on a particular warfarin dose, consult a healthcare provider before making major dietary changes (eg, starting a diet to lose weight, starting a nutritional supplement or vitamin). · Vitamin K -- Eating an increased amount of foods rich in vitamin K can lower the prothrombin time and INR, making warfarin less effective, and potentially increasing the risk of blood clots. Patients who take warfarin should aim to eat a relatively similar amount of vitamin K each week. Some foods have a high level of vitamin K, including: kale, broccoli, spinach, meli or turnip greens, lettuce, [place] sprouts, and cabbage (table 1). It is not necessary to avoid these foods. However, the patient should eat a relatively similar amount on a regular basis rather than eating a large serving occasionally. · Cranberry juice -- There have been mixed reports on the effect of cranberry juice in people who use warfarin to prevent blood clots. Some experts have reported that drinking cranberry juice while on warfarin can cause significant over-anticoagulation and bleeding [1]. However, a small study found that drinking one eight ounce serving of cranberry juice per day for seven days had no effect on the INR of seven men taking warfarin for atrial fibrillation [2]. It is possible that larger amounts could have a more significant effect. The best advice is probably to avoid consuming large amounts of cranberry juice, and to speak with a healthcare provider regarding any concerns about a possible interaction. · Alcohol -- Chronic abuse of alcohol affects the body's ability to handle warfarin. Patients on warfarin therapy should avoid drinking alcohol on a daily basis. Alcohol should be limited to no more than one to two servings of alcohol occasionally.  In addition, drinking excessive amounts of

## 2018-02-15 ENCOUNTER — ANTI-COAG VISIT (OUTPATIENT)
Dept: INTERNAL MEDICINE | Age: 83
End: 2018-02-15

## 2018-02-15 ENCOUNTER — NURSE ONLY (OUTPATIENT)
Dept: INTERNAL MEDICINE | Age: 83
End: 2018-02-15
Payer: MEDICARE

## 2018-02-15 DIAGNOSIS — Z79.01 LONG TERM CURRENT USE OF ANTICOAGULANT THERAPY: Primary | ICD-10-CM

## 2018-02-15 LAB
INTERNATIONAL NORMALIZATION RATIO, POC: 3.5
PROTHROMBIN TIME, POC: NORMAL

## 2018-02-15 PROCEDURE — 85610 PROTHROMBIN TIME: CPT | Performed by: INTERNAL MEDICINE

## 2018-02-15 NOTE — PROGRESS NOTES
530   Spinach, frozen (cooked or boiled, drained) 1/2 cup 514   Spinach, raw 1 cup 150   Marcela greens, frozen (cooked, drained) 1/2 cup 530   Turnip greens, frozen (cooked, drained) 1/2 cup 425   [place] sprouts, frozen (cooked, drained) 1/2 cup 110   Moderate level vitamin K foods   Asparagus, frozen (cooked, drained) 1/2 cup  4 nevarez 72  48   Asparagus, fresh (cooked, drained) 4 nevarez 30   Broccoli, frozen (cooked, drained) 1/2 cup 60   Broccoli, fresh (cooked, drained) 1 spear 52   Broccoli, raw 1/2 cup 40   Lettuce (butterhead, [place], rhona) 1/2 head 80   Lettuce (iceberg, crisphead) 1/2 head 65   Lettuce (dunia, cos) 1 cup 57   Lettuce (green leaf) 1 cup 97   Okra, fresh (cooked, drained) 1/2 cup 32   Okra, frozen (cooked, drained) 1/2 cup 44   Cabbage (cooked, drained) 1/2 cup 73   Cabbage, raw 1/2 cup 21   Cabbage, zane (raw) 1/2 cup 24   Cabbage, Chinese (cooked, drained) 1/2 cup 28   Coleslaw (fast food-type) 3/4 cup 56   Sauerkraut, canned 1/2 cup 41   Peas, frozen, with pod (cooked, drained) 1/2 cup 24   Peas, fresh, with pod (cooked, drained) 1/2 cup 20   Peas, green, frozen (cooked, drained) 1/2 cup 18   Celery, raw 1/2 cup 17   Beans, green or yellow, fresh (cooked, drained) 1/2 cup 10   Oil, canola 1 tablespoon 17   Oil, olive 1 tablespoon 8   Oil, other (including peanut, sesame, safflower, corn, sunflower, soybean) 1 tablespoon 3 or less   Green tea, brewed in hot water 3.5 ounces 0.3   Data from: [employer]. USDA Nutrient Database for Standard Reference. Nutrient Data Laboratory Home Page, [employer]CardRecommendations.ca.

## 2018-02-22 ENCOUNTER — NURSE ONLY (OUTPATIENT)
Dept: INTERNAL MEDICINE | Age: 83
End: 2018-02-22
Payer: MEDICARE

## 2018-02-22 ENCOUNTER — ANTI-COAG VISIT (OUTPATIENT)
Dept: INTERNAL MEDICINE | Age: 83
End: 2018-02-22

## 2018-02-22 DIAGNOSIS — I48.91 ATRIAL FIBRILLATION, UNSPECIFIED TYPE (HCC): ICD-10-CM

## 2018-02-22 DIAGNOSIS — Z79.01 LONG TERM CURRENT USE OF ANTICOAGULANT THERAPY: Primary | ICD-10-CM

## 2018-02-22 DIAGNOSIS — Z79.01 LONG TERM CURRENT USE OF ANTICOAGULANT THERAPY: ICD-10-CM

## 2018-02-22 LAB
INR BLD: 3.13 (ref 0.88–1.18)
PROTHROMBIN TIME: 32.4 SEC (ref 12–14.6)

## 2018-02-22 PROCEDURE — 85610 PROTHROMBIN TIME: CPT | Performed by: INTERNAL MEDICINE

## 2018-02-22 NOTE — PROGRESS NOTES
Ms. Kenya Gonsalez was here today in the lab for an INR check. INR today: 3.13      INR Goal: 2.0-3.0    Dosing Plan  As of 2/22/2018    TTR:   0.0 % (4 d)   Full instructions:   2/22: Hold; Otherwise 5 mg on Thu, Sat; 2.5 mg all other days                 PLAN: She has been consistently thin. She denies antibiotic use. HOLD TONIGHT then resume same dose. NEXT COUMADIN CLINIC APT IS: 03/28/18 @ 11:30pm        [facility] INTERNAL MEDICINE COUMADIN CLINIC  [phone]    [facility] SIDE EFFECTS -- The major complication associated with warfarin is bleeding due to excessive anticoagulation. Excessive bleeding, or hemorrhage, can occur from any area of the body, and patients on warfarin should report any falls or accidents, as well as signs or symptoms of bleeding or unusual bruising. Signs of unusual bleeding include bleeding from the gums, blood in the urine, bloody or dark stool, a nosebleed, or vomiting blood. Because the risk of bleeding increases as the INR rises, the INR is closely monitored and adjustments are made as needed to maintain the INR within the target range. Valaria Christopher also cause skin necrosis or gangrene, which can cause dark red or black areas on the skin. This is a rare complication that may occur during the first several days of warfarin therapy. When to seek help -- If there are obvious or subtle signs of bleeding, including the following, patients should call their healthcare provider immediately.   · Persistent nausea, stomach upset, or vomiting blood or other material that looks like coffee grounds   · Headaches, dizziness, or weakness   · Nosebleeds   · Dark red or brown urine   · Blood in the bowel movement or dark-colored stool   · Pain, discomfort, or swelling, especially after an injury   · After a serious fall or head injury, even if there are no other symptoms  The patient should also call if any of the following occurs:  · Bleeding from the gums after brushing the teeth

## 2018-02-23 ENCOUNTER — TELEPHONE (OUTPATIENT)
Dept: INTERNAL MEDICINE | Age: 83
End: 2018-02-23

## 2018-02-23 NOTE — TELEPHONE ENCOUNTER
Change her to 2.5mg every day - and keep coumadin clinic on 2/28 as scheduled and mail an appointment for an ov in next 2 months or so

## 2018-02-28 ENCOUNTER — NURSE ONLY (OUTPATIENT)
Dept: INTERNAL MEDICINE | Age: 83
End: 2018-02-28
Payer: MEDICARE

## 2018-02-28 ENCOUNTER — ANTI-COAG VISIT (OUTPATIENT)
Dept: INTERNAL MEDICINE | Age: 83
End: 2018-02-28

## 2018-02-28 DIAGNOSIS — Z79.01 LONG TERM CURRENT USE OF ANTICOAGULANT THERAPY: ICD-10-CM

## 2018-02-28 DIAGNOSIS — I48.19 PERSISTENT ATRIAL FIBRILLATION (HCC): Primary | ICD-10-CM

## 2018-02-28 LAB
INTERNATIONAL NORMALIZATION RATIO, POC: 1.9
PROTHROMBIN TIME, POC: NORMAL

## 2018-02-28 PROCEDURE — 85610 PROTHROMBIN TIME: CPT | Performed by: INTERNAL MEDICINE

## 2018-03-14 ENCOUNTER — NURSE ONLY (OUTPATIENT)
Dept: INTERNAL MEDICINE | Age: 83
End: 2018-03-14
Payer: MEDICARE

## 2018-03-14 ENCOUNTER — ANTI-COAG VISIT (OUTPATIENT)
Dept: INTERNAL MEDICINE | Age: 83
End: 2018-03-14

## 2018-03-14 DIAGNOSIS — Z79.01 LONG TERM CURRENT USE OF ANTICOAGULANT THERAPY: Primary | ICD-10-CM

## 2018-03-14 LAB
INTERNATIONAL NORMALIZATION RATIO, POC: 2.8
INTERNATIONAL NORMALIZATION RATIO, POC: 2.8
PROTHROMBIN TIME, POC: NORMAL
PROTHROMBIN TIME, POC: NORMAL

## 2018-03-14 PROCEDURE — 85610 PROTHROMBIN TIME: CPT | Performed by: INTERNAL MEDICINE

## 2018-03-14 NOTE — PROGRESS NOTES
(cooked or boiled, drained) 1/2 cup 530   Spinach, frozen (cooked or boiled, drained) 1/2 cup 514   Spinach, raw 1 cup 150   Marcela greens, frozen (cooked, drained) 1/2 cup 530   Turnip greens, frozen (cooked, drained) 1/2 cup 425   [place] sprouts, frozen (cooked, drained) 1/2 cup 110   Moderate level vitamin K foods   Asparagus, frozen (cooked, drained) 1/2 cup  4 nevarez 72  48   Asparagus, fresh (cooked, drained) 4 nevarez 30   Broccoli, frozen (cooked, drained) 1/2 cup 60   Broccoli, fresh (cooked, drained) 1 spear 52   Broccoli, raw 1/2 cup 40   Lettuce (butterhead, [place], rhona) 1/2 head 80   Lettuce (iceberg, crisphead) 1/2 head 65   Lettuce (dunia, cos) 1 cup 57   Lettuce (green leaf) 1 cup 97   Okra, fresh (cooked, drained) 1/2 cup 32   Okra, frozen (cooked, drained) 1/2 cup 44   Cabbage (cooked, drained) 1/2 cup 73   Cabbage, raw 1/2 cup 21   Cabbage, zane (raw) 1/2 cup 24   Cabbage, Chinese (cooked, drained) 1/2 cup 28   Coleslaw (fast food-type) 3/4 cup 56   Sauerkraut, canned 1/2 cup 41   Peas, frozen, with pod (cooked, drained) 1/2 cup 24   Peas, fresh, with pod (cooked, drained) 1/2 cup 20   Peas, green, frozen (cooked, drained) 1/2 cup 18   Celery, raw 1/2 cup 17   Beans, green or yellow, fresh (cooked, drained) 1/2 cup 10   Oil, canola 1 tablespoon 17   Oil, olive 1 tablespoon 8   Oil, other (including peanut, sesame, safflower, corn, sunflower, soybean) 1 tablespoon 3 or less   Green tea, brewed in hot water 3.5 ounces 0.3   Data from: [employer]. USDA Nutrient Database for Standard Reference. Nutrient Data Laboratory Home Page, [employer]CardRecommendations.ca.

## 2018-03-26 ENCOUNTER — TELEPHONE (OUTPATIENT)
Dept: INTERNAL MEDICINE | Age: 83
End: 2018-03-26

## 2018-03-26 NOTE — TELEPHONE ENCOUNTER
INR was 2.9 on 3/23/18,  Advised to cont. 5 mg daily and 2.5 mg on thurs, sat. And recheck next week.

## 2018-04-12 PROBLEM — J06.9 URI (UPPER RESPIRATORY INFECTION): Status: RESOLVED | Noted: 2017-11-30 | Resolved: 2018-04-12

## 2018-04-19 RX ORDER — CARVEDILOL 12.5 MG/1
12.5 TABLET ORAL 2 TIMES DAILY WITH MEALS
Qty: 180 TABLET | Refills: 3 | Status: SHIPPED | OUTPATIENT
Start: 2018-04-19 | End: 2018-06-28

## 2018-05-04 ENCOUNTER — CLINICAL SUPPORT NO REQUIREMENTS (OUTPATIENT)
Dept: CARDIOLOGY | Facility: CLINIC | Age: 83
End: 2018-05-04

## 2018-05-04 ENCOUNTER — PREP FOR SURGERY (OUTPATIENT)
Dept: OTHER | Facility: HOSPITAL | Age: 83
End: 2018-05-04

## 2018-05-04 DIAGNOSIS — Z45.018 PACEMAKER END OF LIFE: ICD-10-CM

## 2018-05-04 DIAGNOSIS — Z95.0 PACEMAKER: Primary | ICD-10-CM

## 2018-05-04 DIAGNOSIS — I49.5 SSS (SICK SINUS SYNDROME) (HCC): Primary | ICD-10-CM

## 2018-05-04 DIAGNOSIS — I49.5 SICK SINUS SYNDROME (HCC): ICD-10-CM

## 2018-05-04 PROCEDURE — 93288 INTERROG EVL PM/LDLS PM IP: CPT | Performed by: INTERNAL MEDICINE

## 2018-05-04 NOTE — PROGRESS NOTES
I have reviewed the notes, assessments, and/or procedures performed by Selina Odonnell, I concur with her/his documentation of Tala Bethea.    Pacemaker generator has reached end-of-life.  Order placed for generator change.

## 2018-05-04 NOTE — PROGRESS NOTES
Dual Chamber Pacemaker Evaluation Report  In Office    May 4, 2018    Primary Cardiologist: Benita  : Medtronic Model: EnRhythm  Implant date: 05/03/2010    Reason for evaluation: battery check  Indication for pacemaker: sick sinus syndrome and a-fib    Measurements  Atrial sensing - P wave: 2.3 mV  Atrial threshold: N/R  Atrial lead impedance: 352 ohms  Ventricular sensing - R wave: N/R  Ventricular threshold: N/R  Ventricular lead impedance:   416 ohms     Diagnostic Data  Atrial paced: 32.1 %  Ventricular paced: 97.7 %  Other: 52.2% AF burden- Longest 16 days- Meds include Coumadin  Battery status: EOL 2.76 (BRITTNEY= 2.81 on 12/08/2017) Still pacing on current EMG      Final Parameters  Mode:  VVI-- Device change to VVI at BRITTNEY  Lower rate: 65 bpm   Upper rate: N/R  AV Delay:   Atrial -   Sensitivity: 0.3 mV     Ventricular - Amplitude: 2 V  Pulse width: 0.4 ms  Sensitivity: 0.9 mV    Changes made: None  Conclusions: Battery EOL- BRITTNEY triggered 12/08/2017    Follow up: Will follow up post GEN change

## 2018-05-09 ENCOUNTER — TELEPHONE (OUTPATIENT)
Dept: INTERNAL MEDICINE | Age: 83
End: 2018-05-09

## 2018-05-11 ENCOUNTER — ANTI-COAG VISIT (OUTPATIENT)
Dept: PRIMARY CARE CLINIC | Age: 83
End: 2018-05-11

## 2018-05-15 ENCOUNTER — HOSPITAL ENCOUNTER (OUTPATIENT)
Facility: HOSPITAL | Age: 83
Discharge: HOME | End: 2018-05-15
Attending: INTERNAL MEDICINE | Admitting: INTERNAL MEDICINE

## 2018-05-15 VITALS
HEART RATE: 78 BPM | RESPIRATION RATE: 18 BRPM | OXYGEN SATURATION: 98 % | DIASTOLIC BLOOD PRESSURE: 98 MMHG | TEMPERATURE: 99 F | WEIGHT: 125 LBS | HEIGHT: 61 IN | BODY MASS INDEX: 23.6 KG/M2 | SYSTOLIC BLOOD PRESSURE: 135 MMHG

## 2018-05-15 DIAGNOSIS — Z45.018 PACEMAKER END OF LIFE: ICD-10-CM

## 2018-05-15 DIAGNOSIS — I48.19 PERSISTENT ATRIAL FIBRILLATION (HCC): Primary | ICD-10-CM

## 2018-05-15 DIAGNOSIS — I49.5 SSS (SICK SINUS SYNDROME) (HCC): ICD-10-CM

## 2018-05-15 LAB
ALBUMIN SERPL-MCNC: 4.2 G/DL (ref 3.5–5)
ALBUMIN/GLOB SERPL: 1.4 G/DL (ref 1.1–2.5)
ALP SERPL-CCNC: 79 U/L (ref 24–120)
ALT SERPL W P-5'-P-CCNC: 20 U/L (ref 0–54)
ANION GAP SERPL CALCULATED.3IONS-SCNC: 11 MMOL/L (ref 4–13)
AST SERPL-CCNC: 26 U/L (ref 7–45)
BASOPHILS # BLD AUTO: 0.05 10*3/MM3 (ref 0–0.2)
BASOPHILS NFR BLD AUTO: 0.6 % (ref 0–2)
BILIRUB SERPL-MCNC: 0.6 MG/DL (ref 0.1–1)
BUN BLD-MCNC: 18 MG/DL (ref 5–21)
BUN/CREAT SERPL: 14.6 (ref 7–25)
CALCIUM SPEC-SCNC: 9.6 MG/DL (ref 8.4–10.4)
CHLORIDE SERPL-SCNC: 102 MMOL/L (ref 98–110)
CO2 SERPL-SCNC: 31 MMOL/L (ref 24–31)
CREAT BLD-MCNC: 1.23 MG/DL (ref 0.5–1.4)
DEPRECATED RDW RBC AUTO: 46.4 FL (ref 40–54)
EOSINOPHIL # BLD AUTO: 0.15 10*3/MM3 (ref 0–0.7)
EOSINOPHIL NFR BLD AUTO: 1.9 % (ref 0–4)
ERYTHROCYTE [DISTWIDTH] IN BLOOD BY AUTOMATED COUNT: 14.8 % (ref 12–15)
GFR SERPL CREATININE-BSD FRML MDRD: 41 ML/MIN/1.73
GLOBULIN UR ELPH-MCNC: 2.9 GM/DL
GLUCOSE BLD-MCNC: 111 MG/DL (ref 70–100)
HCT VFR BLD AUTO: 40.9 % (ref 37–47)
HGB BLD-MCNC: 13.2 G/DL (ref 12–16)
IMM GRANULOCYTES # BLD: 0.03 10*3/MM3 (ref 0–0.03)
IMM GRANULOCYTES NFR BLD: 0.4 % (ref 0–5)
LYMPHOCYTES # BLD AUTO: 1.81 10*3/MM3 (ref 0.72–4.86)
LYMPHOCYTES NFR BLD AUTO: 22.8 % (ref 15–45)
MCH RBC QN AUTO: 27.7 PG (ref 28–32)
MCHC RBC AUTO-ENTMCNC: 32.3 G/DL (ref 33–36)
MCV RBC AUTO: 85.7 FL (ref 82–98)
MONOCYTES # BLD AUTO: 0.74 10*3/MM3 (ref 0.19–1.3)
MONOCYTES NFR BLD AUTO: 9.3 % (ref 4–12)
NEUTROPHILS # BLD AUTO: 5.15 10*3/MM3 (ref 1.87–8.4)
NEUTROPHILS NFR BLD AUTO: 65 % (ref 39–78)
NRBC BLD MANUAL-RTO: 0 /100 WBC (ref 0–0)
PLATELET # BLD AUTO: 215 10*3/MM3 (ref 130–400)
PMV BLD AUTO: 9.7 FL (ref 6–12)
POTASSIUM BLD-SCNC: 3.5 MMOL/L (ref 3.5–5.3)
PROT SERPL-MCNC: 7.1 G/DL (ref 6.3–8.7)
RBC # BLD AUTO: 4.77 10*6/MM3 (ref 4.2–5.4)
SODIUM BLD-SCNC: 144 MMOL/L (ref 135–145)
WBC NRBC COR # BLD: 7.93 10*3/MM3 (ref 4.8–10.8)

## 2018-05-15 PROCEDURE — A9270 NON-COVERED ITEM OR SERVICE: HCPCS | Performed by: INTERNAL MEDICINE

## 2018-05-15 PROCEDURE — 80053 COMPREHEN METABOLIC PANEL: CPT | Performed by: INTERNAL MEDICINE

## 2018-05-15 PROCEDURE — 85025 COMPLETE CBC W/AUTO DIFF WBC: CPT | Performed by: INTERNAL MEDICINE

## 2018-05-15 PROCEDURE — 33228 REMV&REPLC PM GEN DUAL LEAD: CPT | Performed by: INTERNAL MEDICINE

## 2018-05-15 PROCEDURE — S0260 H&P FOR SURGERY: HCPCS | Performed by: INTERNAL MEDICINE

## 2018-05-15 PROCEDURE — 33208 INSRT HEART PM ATRIAL & VENT: CPT | Performed by: INTERNAL MEDICINE

## 2018-05-15 PROCEDURE — C1785 PMKR, DUAL, RATE-RESP: HCPCS | Performed by: INTERNAL MEDICINE

## 2018-05-15 PROCEDURE — 63710000001 MUPIROCIN 2 % OINTMENT 1 G SYRINGE: Performed by: INTERNAL MEDICINE

## 2018-05-15 PROCEDURE — 99152 MOD SED SAME PHYS/QHP 5/>YRS: CPT | Performed by: INTERNAL MEDICINE

## 2018-05-15 PROCEDURE — 25010000002 MIDAZOLAM PER 1 MG: Performed by: INTERNAL MEDICINE

## 2018-05-15 PROCEDURE — 25010000002 FENTANYL CITRATE (PF) 100 MCG/2ML SOLUTION: Performed by: INTERNAL MEDICINE

## 2018-05-15 DEVICE — GEN PM ADVISA SURESCAN DR MRI: Type: IMPLANTABLE DEVICE | Status: FUNCTIONAL

## 2018-05-15 RX ORDER — FENTANYL CITRATE 50 UG/ML
INJECTION, SOLUTION INTRAMUSCULAR; INTRAVENOUS AS NEEDED
Status: DISCONTINUED | OUTPATIENT
Start: 2018-05-15 | End: 2018-05-15 | Stop reason: HOSPADM

## 2018-05-15 RX ORDER — SODIUM CHLORIDE 9 MG/ML
125 INJECTION, SOLUTION INTRAVENOUS CONTINUOUS
Status: DISCONTINUED | OUTPATIENT
Start: 2018-05-15 | End: 2018-05-15 | Stop reason: HOSPADM

## 2018-05-15 RX ORDER — SODIUM CHLORIDE 0.9 % (FLUSH) 0.9 %
1-10 SYRINGE (ML) INJECTION AS NEEDED
Status: DISCONTINUED | OUTPATIENT
Start: 2018-05-15 | End: 2018-05-15 | Stop reason: HOSPADM

## 2018-05-15 RX ORDER — LIDOCAINE HYDROCHLORIDE 20 MG/ML
INJECTION, SOLUTION INFILTRATION; PERINEURAL AS NEEDED
Status: DISCONTINUED | OUTPATIENT
Start: 2018-05-15 | End: 2018-05-15 | Stop reason: HOSPADM

## 2018-05-15 RX ORDER — MIDAZOLAM HYDROCHLORIDE 1 MG/ML
INJECTION INTRAMUSCULAR; INTRAVENOUS AS NEEDED
Status: DISCONTINUED | OUTPATIENT
Start: 2018-05-15 | End: 2018-05-15 | Stop reason: HOSPADM

## 2018-05-15 RX ORDER — NEOMYCIN AND POLYMYXIN B SULFATES 40; 200000 MG/ML; [USP'U]/ML
SOLUTION IRRIGATION AS NEEDED
Status: DISCONTINUED | OUTPATIENT
Start: 2018-05-15 | End: 2018-05-15 | Stop reason: HOSPADM

## 2018-05-15 RX ADMIN — Medication 2 G: at 08:18

## 2018-05-15 RX ADMIN — SODIUM CHLORIDE 125 ML/HR: 9 INJECTION, SOLUTION INTRAVENOUS at 07:50

## 2018-05-15 RX ADMIN — Medication: at 07:55

## 2018-05-15 NOTE — H&P
Subjective   Tala Bethea is a 87 y.o. female.    Chief Complaint: pacemaker generator BRITTNEY    HPI: Ms Bethea is an 87 year old female with significant past medical history of atrial fibrillation, hyperlipidemia, mitral regurgitation, SSS with pacemaker in place, and hypothyroidism.  Her pacemaker has reached BRITTNEY and she is here for pacemaker generator change.        Patient Active Problem List   Diagnosis   • Persistent atrial fibrillation   • Mixed hyperlipidemia   • Mitral valve insufficiency   • Pacemaker   • Sick sinus syndrome   • Hypothyroid       Past Medical History:   Diagnosis Date   • Arrhythmia    • Atherosclerosis of native coronary artery of native heart without angina pectoris    • Atrial fibrillation by electrocardiogram     neg stress 4/10   • Atrial fibrillation by electrocardiogram    • Fatigue    • Hyperlipidemia, mixed    • Hypertension, benign    • Mitral valve disorder     moderate MR per 8/12 echo - worsening c/w 2010 - prob component of her dyspnea   • Sick sinus syndrome    • Status post placement of cardiac pacemaker      Past Surgical History:   Procedure Laterality Date   • CORONARY ANGIOPLASTY  09/2009    stent LAD   • INSERT / REPLACE / REMOVE PACEMAKER         The following portions of the patient's history were reviewed and updated as appropriate: allergies, current medications, past family history, past medical history, past social history, past surgical history and problem list.    Social History     Social History   • Marital status:      Social History Main Topics   • Smoking status: Former Smoker     Quit date: 1992   • Smokeless tobacco: Never Used   • Alcohol use No   • Drug use: No   • Sexual activity: No     Other Topics Concern   • Not on file       Family History   Problem Relation Age of Onset   • Coronary artery disease Mother        Review of Systems: A 12 system review of systems was completed and is negative except stated in HPI.     Objective   /97 (BP  "Location: Right arm, Patient Position: Sitting)   Pulse 66   Temp 99 °F (37.2 °C) (Temporal Artery )   Resp 10   Ht 154.9 cm (61\")   Wt 56.7 kg (125 lb)   SpO2 99%   BMI 23.62 kg/m²     Physical Exam:  Physical Exam   Constitutional: She is oriented to person, place, and time. She appears well-developed and well-nourished.   HENT:   Head: Normocephalic and atraumatic.   Cardiovascular: Normal rate, regular rhythm and normal heart sounds.    No murmur heard.  Pulmonary/Chest: Effort normal and breath sounds normal.       Musculoskeletal: She exhibits no edema.   Neurological: She is alert and oriented to person, place, and time.   Skin: Skin is warm and dry.   Psychiatric: She has a normal mood and affect.       No results found for: CKTOTAL, CKMB, CKMBINDEX, TROPONINI, TROPONINT  Lab Results   Component Value Date    GLUCOSE 111 (H) 05/15/2018    CALCIUM 9.6 05/15/2018     05/15/2018    K 3.5 05/15/2018    CO2 31.0 05/15/2018     05/15/2018    BUN 18 05/15/2018    CREATININE 1.23 05/15/2018    EGFRIFNONA 41 (L) 05/15/2018    BCR 14.6 05/15/2018    ANIONGAP 11.0 05/15/2018     Lab Results   Component Value Date    WBC 7.93 05/15/2018    HGB 13.2 05/15/2018    HCT 40.9 05/15/2018    MCV 85.7 05/15/2018     05/15/2018         Assessment:  1. Sick Sinus syndrome  2. Pacemaker generator BRITTNEY  3. Atrial fibrillation  4. Mitral regurgitation  5. Tricuspid regurgitation  6. Hyperlipidemia    Plan:  - pacemaker generator change today  "

## 2018-05-16 ENCOUNTER — ANTI-COAG VISIT (OUTPATIENT)
Dept: INTERNAL MEDICINE | Age: 83
End: 2018-05-16

## 2018-05-16 ENCOUNTER — TELEPHONE (OUTPATIENT)
Dept: INTERNAL MEDICINE | Age: 83
End: 2018-05-16

## 2018-05-16 LAB — INR BLD: 1.3

## 2018-05-23 ENCOUNTER — ANTI-COAG VISIT (OUTPATIENT)
Dept: INTERNAL MEDICINE | Age: 83
End: 2018-05-23

## 2018-05-23 LAB — INR BLD: 1.9

## 2018-05-29 RX ORDER — LEVOTHYROXINE SODIUM 0.07 MG/1
TABLET ORAL
Qty: 90 TABLET | Refills: 3 | Status: SHIPPED | OUTPATIENT
Start: 2018-05-29

## 2018-05-30 ENCOUNTER — ANTI-COAG VISIT (OUTPATIENT)
Dept: INTERNAL MEDICINE | Age: 83
End: 2018-05-30

## 2018-05-30 LAB — INR BLD: 2.2

## 2018-06-06 LAB — INR BLD: 2.3

## 2018-06-07 ENCOUNTER — ANTI-COAG VISIT (OUTPATIENT)
Dept: INTERNAL MEDICINE | Age: 83
End: 2018-06-07

## 2018-06-13 ENCOUNTER — ANTI-COAG VISIT (OUTPATIENT)
Dept: INTERNAL MEDICINE | Age: 83
End: 2018-06-13

## 2018-06-13 LAB — INR BLD: 2

## 2018-06-20 LAB — INR BLD: 2.5

## 2018-06-21 ENCOUNTER — ANTI-COAG VISIT (OUTPATIENT)
Dept: INTERNAL MEDICINE | Age: 83
End: 2018-06-21

## 2018-06-27 LAB — INR BLD: 2.7

## 2018-06-28 ENCOUNTER — TELEPHONE (OUTPATIENT)
Dept: CARDIOLOGY | Facility: CLINIC | Age: 83
End: 2018-06-28

## 2018-06-28 ENCOUNTER — OFFICE VISIT (OUTPATIENT)
Dept: INTERNAL MEDICINE | Age: 83
End: 2018-06-28
Payer: MEDICARE

## 2018-06-28 ENCOUNTER — ANTI-COAG VISIT (OUTPATIENT)
Dept: INTERNAL MEDICINE | Age: 83
End: 2018-06-28

## 2018-06-28 ENCOUNTER — CARE COORDINATION (OUTPATIENT)
Dept: CARE COORDINATION | Age: 83
End: 2018-06-28

## 2018-06-28 VITALS
DIASTOLIC BLOOD PRESSURE: 80 MMHG | SYSTOLIC BLOOD PRESSURE: 120 MMHG | WEIGHT: 123 LBS | HEART RATE: 72 BPM | BODY MASS INDEX: 22.5 KG/M2

## 2018-06-28 DIAGNOSIS — I10 ESSENTIAL HYPERTENSION: ICD-10-CM

## 2018-06-28 DIAGNOSIS — I50.42 CHRONIC COMBINED SYSTOLIC AND DIASTOLIC CONGESTIVE HEART FAILURE (HCC): ICD-10-CM

## 2018-06-28 DIAGNOSIS — E78.00 HYPERCHOLESTEROLEMIA: ICD-10-CM

## 2018-06-28 DIAGNOSIS — E03.9 ACQUIRED HYPOTHYROIDISM: ICD-10-CM

## 2018-06-28 DIAGNOSIS — I49.5 SICK SINUS SYNDROME (HCC): ICD-10-CM

## 2018-06-28 DIAGNOSIS — I48.19 PERSISTENT ATRIAL FIBRILLATION (HCC): Primary | ICD-10-CM

## 2018-06-28 LAB
ALBUMIN SERPL-MCNC: 4.1 G/DL (ref 3.5–5.2)
ALP BLD-CCNC: 82 U/L (ref 35–104)
ALT SERPL-CCNC: 9 U/L (ref 5–33)
ANION GAP SERPL CALCULATED.3IONS-SCNC: 14 MMOL/L (ref 7–19)
AST SERPL-CCNC: 19 U/L (ref 5–32)
BILIRUB SERPL-MCNC: 0.8 MG/DL (ref 0.2–1.2)
BUN BLDV-MCNC: 17 MG/DL (ref 8–23)
CALCIUM SERPL-MCNC: 9.6 MG/DL (ref 8.8–10.2)
CHLORIDE BLD-SCNC: 99 MMOL/L (ref 98–111)
CHOLESTEROL, TOTAL: 153 MG/DL (ref 160–199)
CO2: 28 MMOL/L (ref 22–29)
CREAT SERPL-MCNC: 1 MG/DL (ref 0.5–0.9)
GFR NON-AFRICAN AMERICAN: 52
GLUCOSE BLD-MCNC: 107 MG/DL (ref 74–109)
HCT VFR BLD CALC: 40.3 % (ref 37–47)
HDLC SERPL-MCNC: 47 MG/DL (ref 65–121)
HEMOGLOBIN: 12.4 G/DL (ref 12–16)
LDL CHOLESTEROL CALCULATED: 81 MG/DL
MCH RBC QN AUTO: 27.3 PG (ref 27–31)
MCHC RBC AUTO-ENTMCNC: 30.8 G/DL (ref 33–37)
MCV RBC AUTO: 88.6 FL (ref 81–99)
PDW BLD-RTO: 15.8 % (ref 11.5–14.5)
PLATELET # BLD: 202 K/UL (ref 130–400)
PMV BLD AUTO: 10.4 FL (ref 9.4–12.3)
POTASSIUM SERPL-SCNC: 3.5 MMOL/L (ref 3.5–5)
RBC # BLD: 4.55 M/UL (ref 4.2–5.4)
SODIUM BLD-SCNC: 141 MMOL/L (ref 136–145)
TOTAL PROTEIN: 6.7 G/DL (ref 6.6–8.7)
TRIGL SERPL-MCNC: 126 MG/DL (ref 0–149)
TSH SERPL DL<=0.05 MIU/L-ACNC: 0.8 UIU/ML (ref 0.27–4.2)
WBC # BLD: 6.5 K/UL (ref 4.8–10.8)

## 2018-06-28 PROCEDURE — G8420 CALC BMI NORM PARAMETERS: HCPCS | Performed by: INTERNAL MEDICINE

## 2018-06-28 PROCEDURE — 1036F TOBACCO NON-USER: CPT | Performed by: INTERNAL MEDICINE

## 2018-06-28 PROCEDURE — G8598 ASA/ANTIPLAT THER USED: HCPCS | Performed by: INTERNAL MEDICINE

## 2018-06-28 PROCEDURE — 99214 OFFICE O/P EST MOD 30 MIN: CPT | Performed by: INTERNAL MEDICINE

## 2018-06-28 PROCEDURE — 1090F PRES/ABSN URINE INCON ASSESS: CPT | Performed by: INTERNAL MEDICINE

## 2018-06-28 PROCEDURE — 1123F ACP DISCUSS/DSCN MKR DOCD: CPT | Performed by: INTERNAL MEDICINE

## 2018-06-28 PROCEDURE — 4040F PNEUMOC VAC/ADMIN/RCVD: CPT | Performed by: INTERNAL MEDICINE

## 2018-06-28 PROCEDURE — G8427 DOCREV CUR MEDS BY ELIG CLIN: HCPCS | Performed by: INTERNAL MEDICINE

## 2018-06-28 RX ORDER — FUROSEMIDE 40 MG/1
60 TABLET ORAL DAILY
Qty: 135 TABLET | Refills: 3 | Status: SHIPPED | OUTPATIENT
Start: 2018-06-28 | End: 2018-09-06 | Stop reason: SDUPTHER

## 2018-06-28 ASSESSMENT — ENCOUNTER SYMPTOMS
CHEST TIGHTNESS: 0
ABDOMINAL PAIN: 0
SHORTNESS OF BREATH: 0
BACK PAIN: 0
EYE REDNESS: 1
ABDOMINAL DISTENTION: 0

## 2018-06-28 NOTE — TELEPHONE ENCOUNTER
Patient came to office today with medication questions. She currently takes Carvedilol 12.5 mg PO BID, and Toprol  mg PO daily. Dr. Gaston had prescribed the Carvedilol for the patient, and is now recommending that she stop taking it. Dr. Gaston wanted patient to discuss this with you first. Please advise.

## 2018-06-28 NOTE — PROGRESS NOTES
Chief Complaint   Patient presents with    6 Month Follow-Up     right eye is red       HPI: Patient is here today to follow-up multiple medical problems including atrial fibrillation on chronic Coumadin therapy hypertension chronic compensated systolic diastolic CHF and other medical problems she is to him very well the last several years prior to that had a lot of issues with decompensation of heart failure but she has not had any problem and about 5 or more years. She denies chest pressure chest pain dyspnea abdominal pain she also really doesn't complain of any back pain or arthralgias. She denies syncope or near syncope or lightheadedness. Reviewing her meds we did not know the cardiology had added another beta blocker she is on Coreg and it appears they did not see that she was on Coreg and added metoprolol so she's been taking both of these. Patient woke up with conjunctiva will hemorrhage right eye this morning she's also that her dog got hit by a car yesterday. Past Medical History:   Diagnosis Date    [facility] fibrillation [facility] [address]     CAD in native artery     s/p stents    Carcinoma of tonsil (HCC)     Chronic combined systolic and diastolic congestive heart failure ([facility] [address] 8/7/2017    GERD (gastroesophageal reflux disease)     History of colonic polyps     Hypercholesterolemia     Hyperlipidemia     Hypertension        Past Surgical History:   Procedure Laterality Date    CHOLECYSTECTOMY      TONSILLECTOMY         Family History   Problem Relation Age of Onset    Coronary Art Dis Mother     Hypertension Mother     Cancer Sister     Hypertension Other        Social History     Social History    Marital status:      Spouse name: N/A    Number of children: N/A    Years of education: N/A     Occupational History    Not on file.      Social History Main Topics    Smoking status: Former Smoker    Smokeless tobacco: Never Used    Alcohol use No    Drug use: Unknown    Sexual activity: Not on file     Other Topics Concern    Not on file     Social History Narrative    No narrative on file       No Known Allergies    Current Outpatient Prescriptions   Medication Sig Dispense Refill    furosemide (LASIX) 40 MG tablet Take 1.5 tablets by mouth daily 135 tablet 3    levothyroxine (SYNTHROID) 75 MCG tablet TAKE 1 TABLET DAILY 90 tablet 3    omeprazole (PRILOSEC) 20 MG delayed release capsule Take 2 capsules by mouth daily 180 capsule 3    warfarin (COUMADIN) 5 MG tablet TAKE 1 TABLET ALTERNATE WITH ONE-HALF (1/2) TABLET EVERY OTHER DAY 90 tablet 3    escitalopram (LEXAPRO) 10 MG tablet TAKE ONE-HALF (1/2) TABLET DAILY 135 tablet 3    simvastatin (ZOCOR) 10 MG tablet TAKE 1 TABLET DAILY AT BEDTIME 90 tablet 3    potassium chloride (KLOR-CON M) 20 MEQ extended release tablet Take 1 tablet by mouth daily 30 tablet 5    aspirin 81 MG tablet Take 81 mg by mouth daily      calcium carbonate (OSCAL) 500 MG TABS tablet Take 500 mg by mouth 2 times daily      digoxin (LANOXIN) 125 MCG tablet Take 125 mcg by mouth every other day       nitroGLYCERIN (NITROSTAT) 0.4 MG SL tablet Place 0.4 mg under the tongue as needed. No current facility-administered medications for this visit. Review of Systems   Constitutional: Negative for chills and fever. Eyes: Positive for redness. Respiratory: Negative for chest tightness and shortness of breath. Cardiovascular: Positive for leg swelling. Negative for chest pain and palpitations. Gastrointestinal: Negative for abdominal distention and abdominal pain. Musculoskeletal: Negative for back pain. Neurological: Negative for headaches. Hematological: Bruises/bleeds easily. Psychiatric/Behavioral: Negative for dysphoric mood. The patient is not nervous/anxious.         /80   Pulse 72   Wt 123 lb (55.8 kg)   BMI 22.50 kg/m²     Physical Exam right sclera with sub-conjunctiva will hemorrhage the supraclavicular cervical

## 2018-07-05 ENCOUNTER — ANTI-COAG VISIT (OUTPATIENT)
Dept: INTERNAL MEDICINE | Age: 83
End: 2018-07-05

## 2018-07-05 LAB — INR BLD: 2.2

## 2018-07-11 ENCOUNTER — ANTI-COAG VISIT (OUTPATIENT)
Dept: INTERNAL MEDICINE | Age: 83
End: 2018-07-11

## 2018-07-11 LAB — INR BLD: 1.6

## 2018-07-11 NOTE — PROGRESS NOTES
HOME MONITORING REPORT    INR today:   Results for orders placed or performed in visit on 07/11/18   Protime-INR   Result Value Ref Range    INR 1.60        INR Goal: 2.0-3.0    Dosing Plan  As of 7/11/2018    TTR:   91.3 % (1.5 mo)   Full warfarin instructions:   5 mg, then 2.5 mg repeating every 2 days              PLAN:PATIENT NOTIFIED TO TAKE 7.5MG TONIGHT, THE RESUME CURRENT DOSE AND RECHECK IN ONE WEEK.

## 2018-07-18 ENCOUNTER — ANTI-COAG VISIT (OUTPATIENT)
Dept: INTERNAL MEDICINE | Age: 83
End: 2018-07-18

## 2018-07-18 LAB — INR BLD: 2.6

## 2018-07-18 NOTE — PROGRESS NOTES
HOME MONITORING REPORT    INR today:   Results for orders placed or performed in visit on 07/18/18   Protime-INR   Result Value Ref Range    INR 2.60        INR Goal: 2.0-3.0    Dosing Plan  As of 7/18/2018    TTR:   87.2 % (1.8 mo)   Full warfarin instructions:   5 mg, then 2.5 mg repeating every 2 days              PLAN:PATIENT NOTIFIED TO  CONTINUE CURRENT DOSE AND RECHECK IN ONE WEEK.

## 2018-07-25 ENCOUNTER — ANTI-COAG VISIT (OUTPATIENT)
Dept: INTERNAL MEDICINE | Age: 83
End: 2018-07-25

## 2018-07-25 LAB — INR BLD: 2.8

## 2018-08-01 ENCOUNTER — ANTI-COAG VISIT (OUTPATIENT)
Dept: INTERNAL MEDICINE | Age: 83
End: 2018-08-01

## 2018-08-01 LAB — INR BLD: 2.9

## 2018-08-02 DIAGNOSIS — I48.19 PERSISTENT ATRIAL FIBRILLATION (HCC): ICD-10-CM

## 2018-08-02 DIAGNOSIS — I51.9 SYSTOLIC DYSFUNCTION, LEFT VENTRICLE: ICD-10-CM

## 2018-08-02 RX ORDER — SIMVASTATIN 10 MG
TABLET ORAL
Qty: 90 TABLET | Refills: 3 | Status: SHIPPED | OUTPATIENT
Start: 2018-08-02 | End: 2019-07-28 | Stop reason: SDUPTHER

## 2018-08-03 RX ORDER — METOPROLOL SUCCINATE 100 MG
TABLET, EXTENDED RELEASE 24 HR ORAL
Qty: 30 TABLET | Refills: 0 | Status: SHIPPED | OUTPATIENT
Start: 2018-08-03

## 2018-08-08 ENCOUNTER — ANTI-COAG VISIT (OUTPATIENT)
Dept: INTERNAL MEDICINE | Age: 83
End: 2018-08-08

## 2018-08-08 LAB — INR BLD: 2.8

## 2018-08-10 ENCOUNTER — TELEPHONE (OUTPATIENT)
Dept: CARDIOLOGY | Facility: CLINIC | Age: 83
End: 2018-08-10

## 2018-08-10 ENCOUNTER — TELEPHONE (OUTPATIENT)
Dept: INTERNAL MEDICINE | Age: 83
End: 2018-08-10

## 2018-08-10 DIAGNOSIS — M10.079 ACUTE IDIOPATHIC GOUT OF FOOT, UNSPECIFIED LATERALITY: ICD-10-CM

## 2018-08-10 PROBLEM — M10.9 ACUTE GOUT OF FOOT: Status: ACTIVE | Noted: 2018-08-10

## 2018-08-10 RX ORDER — PREDNISONE 10 MG/1
TABLET ORAL
Qty: 20 TABLET | Refills: 0 | Status: SHIPPED | OUTPATIENT
Start: 2018-08-10 | End: 2018-10-23

## 2018-08-10 NOTE — TELEPHONE ENCOUNTER
This pt daughter Vane Chino called regarding this pt.  The patient will be moving for good to [place] on 8/18 and will need to cancel the upcoming appt with you on 8/20.  She wants to know if you can recommend a cardiologist the patient can establish with in [place]?

## 2018-08-13 DIAGNOSIS — I48.19 PERSISTENT ATRIAL FIBRILLATION (HCC): Primary | ICD-10-CM

## 2018-08-13 NOTE — TELEPHONE ENCOUNTER
I spoke with Rubens Reese and she reports that Mrs. Ornelas foot pain is much improved and will cont.  The tapor

## 2018-08-13 NOTE — TELEPHONE ENCOUNTER
I don't personally know any of the cardiologists in [place], but we can probably get her an appointment with the cardiology group at [facility].

## 2018-08-15 ENCOUNTER — ANTI-COAG VISIT (OUTPATIENT)
Dept: INTERNAL MEDICINE | Age: 83
End: 2018-08-15

## 2018-08-15 LAB — INR BLD: 3.7

## 2018-08-15 NOTE — PROGRESS NOTES
HOME MONITORING REPORT    INR today:   Results for orders placed or performed in visit on 08/15/18   Protime-INR   Result Value Ref Range    INR 3.70        INR Goal: 2.0-3.0    Dosing Plan  As of 8/15/2018    TTR:   84.9 % (2.7 mo)   Full warfarin instructions:   5 mg, then 2.5 mg repeating every 2 days              PLAN: Patient advised to hold tonight and tomorrow, then recheck on Friday.

## 2018-08-21 ENCOUNTER — ANTI-COAG VISIT (OUTPATIENT)
Dept: INTERNAL MEDICINE | Age: 83
End: 2018-08-21

## 2018-08-21 LAB — INR BLD: 1.7

## 2018-08-29 ENCOUNTER — ANTI-COAG VISIT (OUTPATIENT)
Dept: INTERNAL MEDICINE | Age: 83
End: 2018-08-29

## 2018-08-29 LAB — INR BLD: 3.2

## 2018-08-29 NOTE — PROGRESS NOTES
HOME MONITORING REPORT    INR today:   Results for orders placed or performed in visit on 08/29/18   Protime-INR   Result Value Ref Range    INR 3.20        INR Goal: 2.0-3.0    Dosing Plan  As of 8/29/2018    TTR:   81.1 % (3.2 mo)   Full warfarin instructions:   5 mg on Mon, Fri; 2.5 mg all other days              PLAN: Patient notified to change to 2.5mg daily, except 5mg on Mon and Fri and recheck in 1 week.

## 2018-08-30 ENCOUNTER — TELEPHONE (OUTPATIENT)
Dept: INTERNAL MEDICINE | Age: 83
End: 2018-08-30

## 2018-09-05 ENCOUNTER — TELEPHONE (OUTPATIENT)
Dept: INTERNAL MEDICINE | Age: 83
End: 2018-09-05

## 2018-09-05 DIAGNOSIS — I50.42 CHRONIC COMBINED SYSTOLIC AND DIASTOLIC CONGESTIVE HEART FAILURE (HCC): ICD-10-CM

## 2018-09-05 LAB — INR BLD: 4.4

## 2018-09-06 RX ORDER — FUROSEMIDE 40 MG/1
TABLET ORAL
Qty: 135 TABLET | Refills: 3
Start: 2018-09-06 | End: 2019-07-28 | Stop reason: SDUPTHER

## 2018-09-12 ENCOUNTER — ANTI-COAG VISIT (OUTPATIENT)
Dept: INTERNAL MEDICINE | Age: 83
End: 2018-09-12

## 2018-09-12 LAB — INR BLD: 1.6

## 2018-09-19 ENCOUNTER — ANTI-COAG VISIT (OUTPATIENT)
Dept: INTERNAL MEDICINE | Age: 83
End: 2018-09-19

## 2018-09-19 LAB — INR BLD: 1.7

## 2018-09-19 NOTE — PROGRESS NOTES
HOME MONITORING REPORT    INR today:   Results for orders placed or performed in visit on 09/19/18   Protime-INR   Result Value Ref Range    INR 1.70        INR Goal: 2.0-3.0    Dosing Plan  As of 9/19/2018    TTR:   68.6 % (3.9 mo)   Full warfarin instructions:   2.5 mg every day              Detailed message left for patient to take 5mg tonight, then 2.5mg daily and recheck next week.

## 2018-09-26 LAB — INR BLD: 2.3

## 2018-09-28 ENCOUNTER — ANTI-COAG VISIT (OUTPATIENT)
Dept: INTERNAL MEDICINE | Age: 83
End: 2018-09-28

## 2018-09-28 NOTE — PROGRESS NOTES
HOME MONITORING REPORT    INR today:   Results for orders placed or performed in visit on 09/28/18   Protime-INR   Result Value Ref Range    INR 2.30        INR Goal: 2.0-3.0    Dosing Plan  As of 9/28/2018    TTR:   67.6 % (4.1 mo)   Full warfarin instructions:   5 mg on Sun; 2.5 mg all other days           Patient notified to take 2.5mg daily, except 5mg on Sun

## 2018-10-03 ENCOUNTER — ANTI-COAG VISIT (OUTPATIENT)
Dept: INTERNAL MEDICINE | Age: 83
End: 2018-10-03

## 2018-10-03 DIAGNOSIS — I51.9 SYSTOLIC DYSFUNCTION, LEFT VENTRICLE: ICD-10-CM

## 2018-10-03 DIAGNOSIS — I48.19 PERSISTENT ATRIAL FIBRILLATION (HCC): ICD-10-CM

## 2018-10-03 LAB — INR BLD: 3.2

## 2018-10-03 NOTE — PROGRESS NOTES
HOME MONITORING REPORT    INR today:   Results for orders placed or performed in visit on 10/03/18   Protime-INR   Result Value Ref Range    INR 3.20        INR Goal: 2.0-3.0    Dosing Plan  As of 10/3/2018    TTR:   68.1 % (4.3 mo)   Full warfarin instructions:   2.5 mg every day              PLAN: Message left for Monique, to change to 2.5mg daily

## 2018-10-04 RX ORDER — METOPROLOL SUCCINATE 100 MG/1
TABLET, EXTENDED RELEASE ORAL
Qty: 30 TABLET | Refills: 0 | OUTPATIENT
Start: 2018-10-04

## 2018-10-04 NOTE — TELEPHONE ENCOUNTER
Pt no longer under prescriber care. Sees cardiology group at [facility].

## 2018-10-10 ENCOUNTER — ANTI-COAG VISIT (OUTPATIENT)
Dept: INTERNAL MEDICINE | Age: 83
End: 2018-10-10

## 2018-10-10 LAB — INR BLD: 3.3

## 2018-10-15 ENCOUNTER — CLINICAL SUPPORT NO REQUIREMENTS (OUTPATIENT)
Dept: CARDIOLOGY | Facility: CLINIC | Age: 83
End: 2018-10-15

## 2018-10-15 ENCOUNTER — TELEPHONE (OUTPATIENT)
Dept: CARDIOLOGY | Facility: CLINIC | Age: 83
End: 2018-10-15

## 2018-10-15 ENCOUNTER — OFFICE VISIT (OUTPATIENT)
Dept: CARDIOLOGY | Facility: CLINIC | Age: 83
End: 2018-10-15

## 2018-10-15 VITALS
HEART RATE: 80 BPM | BODY MASS INDEX: 22.28 KG/M2 | SYSTOLIC BLOOD PRESSURE: 116 MMHG | WEIGHT: 118 LBS | DIASTOLIC BLOOD PRESSURE: 70 MMHG | HEIGHT: 61 IN

## 2018-10-15 DIAGNOSIS — I44.2 THIRD DEGREE AV BLOCK (HCC): Primary | ICD-10-CM

## 2018-10-15 DIAGNOSIS — I34.0 NON-RHEUMATIC MITRAL REGURGITATION: ICD-10-CM

## 2018-10-15 DIAGNOSIS — I49.5 SICK SINUS SYNDROME (HCC): ICD-10-CM

## 2018-10-15 DIAGNOSIS — I48.20 CHRONIC ATRIAL FIBRILLATION (HCC): Primary | ICD-10-CM

## 2018-10-15 DIAGNOSIS — I25.10 CORONARY ARTERY DISEASE INVOLVING NATIVE CORONARY ARTERY OF NATIVE HEART WITHOUT ANGINA PECTORIS: ICD-10-CM

## 2018-10-15 PROCEDURE — 99204 OFFICE O/P NEW MOD 45 MIN: CPT | Performed by: INTERNAL MEDICINE

## 2018-10-15 PROCEDURE — 93280 PM DEVICE PROGR EVAL DUAL: CPT | Performed by: INTERNAL MEDICINE

## 2018-10-15 PROCEDURE — 93000 ELECTROCARDIOGRAM COMPLETE: CPT | Performed by: INTERNAL MEDICINE

## 2018-10-15 NOTE — PROGRESS NOTES
Subjective:     Encounter Date:10/15/2018      Patient ID: Tala Bethea is a 87 y.o. female.    Chief Complaint: CAF, SSS, MR    History of Present Illness    Tala Cadet is an 87-year-old female who is transferring her care from [place].  She has a past medical history of coronary artery disease status post stenting of her coronary in 2009.  She has sick sinus syndrome status post permanent pacemaker implantation.  She had her pacemaker changed by Dr. Joy recently.  She has a home monitor that she would like to switch over to our care.      She has chronic atrial fibrillation and is anticoagulated with warfarin.  She will switch her home INR care to our clinic.      She has moved here to be with her daughter.  She says she is able to get around without any limitation.  She has no complaints of angina, dyspnea, palpitations or syncope.  She is a former smoker, having quit many years ago.  She had throat cancer in 2000 but this has not recurred.          Review of Systems   All other systems reviewed and are negative.      Family History   Problem Relation Age of Onset   • Coronary artery disease Mother      Social History   Substance Use Topics   • Smoking status: Former Smoker     Quit date: 1992   • Smokeless tobacco: Never Used   • Alcohol use No           ECG 12 Lead  Date/Time: 10/15/2018 10:47 AM  Performed by: DELFINO ENGLISH  Authorized by: DELFINO ENGLISH   Previous ECG: no previous ECG available  Rhythm: atrial fibrillation  Rhythm comments: V-paced  BPM: 80                 Objective:     Physical Exam   Constitutional: She is oriented to person, place, and time. She appears well-developed and well-nourished.   HENT:   Head: Normocephalic and atraumatic.   Neck: Normal range of motion. Neck supple.   Cardiovascular: Normal rate, regular rhythm and normal heart sounds.    Pulmonary/Chest: Effort normal and breath sounds normal.   Abdominal: Soft. Bowel sounds are normal.   Musculoskeletal: Normal range of  motion.   Neurological: She is alert and oriented to person, place, and time.   Skin: Skin is warm and dry.   Psychiatric: She has a normal mood and affect. Her behavior is normal. Thought content normal.   Vitals reviewed.      Lab Review:       Assessment:          Diagnosis Plan   1. Chronic atrial fibrillation (CMS/HCC)     2. Coronary artery disease involving native coronary artery of native heart without angina pectoris     3. Non-rheumatic mitral regurgitation     4. Sick sinus syndrome (CMS/HCC)            Plan:       It was a pleasure to see your patient in cardiac evaluation today. She is a obey 87-year-old woman with chronic atrial fibrillation, on warfarin therapy. I have gotten her established with our warfarin clinic to follow her home monitor.    She has a history of coronary artery disease, status post distant stenting. She is on aspirin and a lipid-lowering therapy for prevention. She has no angina currently.    She has a pacemaker and is status post permanent pacemaker implantation with recent generator change. She will be established with our pacemaker clinic for followup.    She will see me again in 1 year or sooner if symptoms warrant.        Coronary Artery Disease  Assessment  • The patient has no angina    Plan  • Lifestyle modifications discussed include adhering to a heart healthy diet, avoidance of tobacco products, maintenance of a healthy weight, medication compliance, regular exercise and regular monitoring of cholesterol and blood pressure    Subjective - Objective  • There has been a previous stent procedure using ENRIQUETA  • Current antiplatelet therapy includes aspirin 81 mg      Atrial Fibrillation and Atrial Flutter  Assessment  • The patient has permanent atrial fibrillation  • This is non-valvular in etiology  • The patient's CHADS2-VASc score is 4  • A [id number] score of 2 or more is considered a high risk for a thromboembolic event  • Warfarin prescribed  • Aspirin  prescribed    Plan  • Continue in atrial fibrillation with rate control  • Continue aspirin and warfarin for antithrombotic therapy, bleeding issues discussed  • Continue beta blocker and digoxin for rate control

## 2018-10-15 NOTE — TELEPHONE ENCOUNTER
Patient was seen in the office today by Dr. Rodriguez as a new patient.  She is on warfarin for AFIB, and would like for us to manage that now.  She does have a home monitor, and was being managed by PCP.    Please contact patient at [phone].    Thank you,  Chiara - Medical Asst.  Dr Ike Rodriguez

## 2018-10-17 ENCOUNTER — ANTI-COAG VISIT (OUTPATIENT)
Dept: INTERNAL MEDICINE | Age: 83
End: 2018-10-17

## 2018-10-17 LAB — INR BLD: 2.8

## 2018-10-17 RX ORDER — OMEPRAZOLE 20 MG/1
CAPSULE, DELAYED RELEASE ORAL
Qty: 180 CAPSULE | Refills: 3 | Status: SHIPPED | OUTPATIENT
Start: 2018-10-17 | End: 2019-10-08 | Stop reason: SDUPTHER

## 2018-10-23 ENCOUNTER — TELEPHONE (OUTPATIENT)
Dept: INTERNAL MEDICINE | Age: 83
End: 2018-10-23

## 2018-10-23 NOTE — TELEPHONE ENCOUNTER
She has had a crick in her neck for 3 days and it has made her have a headache. She gave her 1 Excedrin for her headache, but I told her not to give her anything with Aspirin in it. She wants to know if you recommend anything for her to take?

## 2018-10-24 ENCOUNTER — ANTI-COAG VISIT (OUTPATIENT)
Dept: INTERNAL MEDICINE | Age: 83
End: 2018-10-24

## 2018-10-24 ENCOUNTER — TELEPHONE (OUTPATIENT)
Dept: INTERNAL MEDICINE | Age: 83
End: 2018-10-24

## 2018-10-24 ENCOUNTER — TELEPHONE (OUTPATIENT)
Dept: PHARMACY | Facility: HOSPITAL | Age: 83
End: 2018-10-24

## 2018-10-24 LAB — INR BLD: 3.4

## 2018-10-24 NOTE — TELEPHONE ENCOUNTER
Daughter states she gave pt Excedrin and believes that is why pt's blood was so thin She has a few questions to go along with this information.

## 2018-10-24 NOTE — PROGRESS NOTES
HOME MONITORING REPORT    INR today:   Results for orders placed or performed in visit on 10/24/18   Protime-INR   Result Value Ref Range    INR 3.40        INR Goal: 2.0-3.0    Dosing Plan  As of 10/24/2018    TTR:   62.0 % (5 mo)   Full warfarin instructions:   10/24: Hold; Otherwise 2.5 mg every day              Message left instructing patient to hold tonight, then continue 2.5mg daily and recheck next week. If still thin, we will need to adjust her dose.

## 2018-10-26 ENCOUNTER — TELEPHONE (OUTPATIENT)
Dept: CARDIOLOGY | Facility: CLINIC | Age: 83
End: 2018-10-26

## 2018-10-26 NOTE — TELEPHONE ENCOUNTER
Vane, calling on behalf of patient, stating that she has been SOA and more fatigued than normal.  She denies any swelling, weight gain, and chest pain.  Also states that she hasn't been eating, so she bought her some Ensure drinks.  States that she checked on patient just a bit ago and seems to be doing somewhat better.    Per verbal from Dr. Rodriguez, continue to monitor her, increase fluids and check vitals, and call back if she doesn't continue to improve.

## 2018-10-30 RX ORDER — METOPROLOL SUCCINATE 100 MG/1
100 TABLET, EXTENDED RELEASE ORAL DAILY
Qty: 90 TABLET | Refills: 3 | Status: SHIPPED | OUTPATIENT
Start: 2018-10-30 | End: 2019-10-08 | Stop reason: SDUPTHER

## 2018-10-31 ENCOUNTER — ANTI-COAG VISIT (OUTPATIENT)
Dept: INTERNAL MEDICINE | Age: 83
End: 2018-10-31

## 2018-10-31 LAB — INR BLD: 3.2

## 2018-11-06 ENCOUNTER — TELEPHONE (OUTPATIENT)
Dept: CARDIOLOGY | Facility: CLINIC | Age: 83
End: 2018-11-06

## 2018-11-06 NOTE — TELEPHONE ENCOUNTER
*Dimple is aware that Dr. Rodriguez is in surgery today, and I will get an answer for her Weds afternoon.

## 2018-11-06 NOTE — TELEPHONE ENCOUNTER
Dimple with [facility], calling to see if it's ok for patient to hold warfarin for 3 days prior to having a tooth extracted.    Patient was last seen in the office by you on 10/15 and is anticoagulated for AFIB.    Please advise.  Thank you

## 2018-11-07 ENCOUNTER — ANTI-COAG VISIT (OUTPATIENT)
Dept: INTERNAL MEDICINE | Age: 83
End: 2018-11-07

## 2018-11-07 LAB — INR BLD: 2.2

## 2018-11-07 NOTE — TELEPHONE ENCOUNTER
Per verbal from Dr. Rodriguez, patient is clear to hold warfarin as requested.    Faxed form with instructions to # provided.

## 2018-11-14 ENCOUNTER — ANTI-COAG VISIT (OUTPATIENT)
Dept: INTERNAL MEDICINE | Age: 83
End: 2018-11-14

## 2018-11-14 LAB — INR BLD: 2.3

## 2018-11-19 RX ORDER — ESCITALOPRAM OXALATE 10 MG/1
TABLET ORAL
Qty: 135 TABLET | Refills: 3 | Status: SHIPPED | OUTPATIENT
Start: 2018-11-19

## 2018-11-21 ENCOUNTER — ANTI-COAG VISIT (OUTPATIENT)
Dept: INTERNAL MEDICINE | Age: 83
End: 2018-11-21

## 2018-11-21 LAB — INR BLD: 1.8

## 2018-11-26 ENCOUNTER — TELEPHONE (OUTPATIENT)
Dept: INTERNAL MEDICINE | Age: 83
End: 2018-11-26

## 2018-11-26 NOTE — TELEPHONE ENCOUNTER
I spoke with Gagandeep Huffman and it is a stair lift - Mail to [facility] at [facility] in  [place], [address]

## 2018-11-28 ENCOUNTER — ANTI-COAG VISIT (OUTPATIENT)
Dept: INTERNAL MEDICINE | Age: 83
End: 2018-11-28

## 2018-11-28 LAB — INR BLD: 2.5

## 2018-12-03 ENCOUNTER — TELEPHONE (OUTPATIENT)
Dept: INTERNAL MEDICINE | Age: 83
End: 2018-12-03

## 2018-12-06 LAB — INR BLD: 2.1

## 2018-12-10 ENCOUNTER — ANTI-COAG VISIT (OUTPATIENT)
Dept: INTERNAL MEDICINE | Age: 83
End: 2018-12-10

## 2018-12-12 ENCOUNTER — HOSPITAL ENCOUNTER (EMERGENCY)
Facility: HOSPITAL | Age: 83
Discharge: LEFT BEFORE DISCHARGE | End: 2018-12-12

## 2018-12-12 ENCOUNTER — ANTI-COAG VISIT (OUTPATIENT)
Dept: INTERNAL MEDICINE | Age: 83
End: 2018-12-12

## 2018-12-12 VITALS — TEMPERATURE: 97.5 F | HEART RATE: 81 BPM | OXYGEN SATURATION: 93 %

## 2018-12-12 LAB — INR BLD: 1.9

## 2018-12-14 ENCOUNTER — TELEPHONE (OUTPATIENT)
Dept: INTERNAL MEDICINE | Age: 83
End: 2018-12-14

## 2018-12-14 DIAGNOSIS — M25.569 ACUTE KNEE PAIN, UNSPECIFIED LATERALITY: ICD-10-CM

## 2018-12-14 RX ORDER — TRAMADOL HYDROCHLORIDE 50 MG/1
50 TABLET ORAL 3 TIMES DAILY PRN
Qty: 30 TABLET | Refills: 0 | Status: SHIPPED | OUTPATIENT
Start: 2018-12-14 | End: 2019-01-13

## 2018-12-19 ENCOUNTER — ANTI-COAG VISIT (OUTPATIENT)
Dept: INTERNAL MEDICINE | Age: 83
End: 2018-12-19

## 2018-12-19 LAB — INR BLD: 2.8

## 2018-12-22 ENCOUNTER — HOSPITAL ENCOUNTER (EMERGENCY)
Facility: HOSPITAL | Age: 83
Discharge: HOME | End: 2018-12-22
Attending: EMERGENCY MEDICINE | Admitting: EMERGENCY MEDICINE

## 2018-12-22 ENCOUNTER — APPOINTMENT (OUTPATIENT)
Dept: GENERAL RADIOLOGY | Facility: HOSPITAL | Age: 83
End: 2018-12-22

## 2018-12-22 ENCOUNTER — APPOINTMENT (OUTPATIENT)
Dept: CT IMAGING | Facility: HOSPITAL | Age: 83
End: 2018-12-22

## 2018-12-22 VITALS
BODY MASS INDEX: 23.19 KG/M2 | HEIGHT: 62 IN | TEMPERATURE: 98.2 F | RESPIRATION RATE: 16 BRPM | OXYGEN SATURATION: 96 % | WEIGHT: 126 LBS | HEART RATE: 76 BPM | DIASTOLIC BLOOD PRESSURE: 88 MMHG | SYSTOLIC BLOOD PRESSURE: 123 MMHG

## 2018-12-22 DIAGNOSIS — S40.012A CONTUSION OF LEFT SHOULDER, INITIAL ENCOUNTER: ICD-10-CM

## 2018-12-22 DIAGNOSIS — Z79.01 ON WARFARIN AT HOME: ICD-10-CM
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
## 2018-12-22 LAB
ALBUMIN SERPL-MCNC: 3.8 G/DL (ref 3.5–5.2)
ALBUMIN/GLOB SERPL: 1.2 G/DL
ALP SERPL-CCNC: 111 U/L (ref 39–117)
ALT SERPL W P-5'-P-CCNC: 16 U/L (ref 1–33)
ANION GAP SERPL CALCULATED.3IONS-SCNC: 14.7 MMOL/L
AST SERPL-CCNC: 29 U/L (ref 1–32)
BACTERIA UR QL AUTO: NORMAL /HPF
BASOPHILS # BLD AUTO: 0.01 10*3/MM3 (ref 0–0.2)
BASOPHILS NFR BLD AUTO: 0.1 % (ref 0–1.5)
BILIRUB SERPL-MCNC: 1.3 MG/DL (ref 0.1–1.2)
BILIRUB UR QL STRIP: NEGATIVE
BUN BLD-MCNC: 19 MG/DL (ref 8–23)
BUN/CREAT SERPL: 19.6 (ref 7–25)
CALCIUM SPEC-SCNC: 10 MG/DL (ref 8.6–10.5)
CHLORIDE SERPL-SCNC: 96 MMOL/L (ref 98–107)
CLARITY UR: CLEAR
CO2 SERPL-SCNC: 24.3 MMOL/L (ref 22–29)
COLOR UR: ABNORMAL
CREAT BLD-MCNC: 0.97 MG/DL (ref 0.57–1)
DEPRECATED RDW RBC AUTO: 54.5 FL (ref 37–54)
DIGOXIN SERPL-MCNC: 1 NG/ML (ref 0.6–1.2)
EOSINOPHIL # BLD AUTO: 0 10*3/MM3 (ref 0–0.7)
EOSINOPHIL NFR BLD AUTO: 0 % (ref 0.3–6.2)
ERYTHROCYTE [DISTWIDTH] IN BLOOD BY AUTOMATED COUNT: 17.5 % (ref 11.7–13)
GFR SERPL CREATININE-BSD FRML MDRD: 54 ML/MIN/1.73
GLOBULIN UR ELPH-MCNC: 3.2 GM/DL
GLUCOSE BLD-MCNC: 196 MG/DL (ref 65–99)
GLUCOSE UR STRIP-MCNC: NEGATIVE MG/DL
HCT VFR BLD AUTO: 42.9 % (ref 35.6–45.5)
HGB BLD-MCNC: 13.1 G/DL (ref 11.9–15.5)
HGB UR QL STRIP.AUTO: NEGATIVE
HYALINE CASTS UR QL AUTO: NORMAL /LPF
IMM GRANULOCYTES # BLD AUTO: 0 10*3/MM3 (ref 0–0.03)
IMM GRANULOCYTES NFR BLD AUTO: 0 % (ref 0–0.5)
INR PPP: 2.92 (ref 0.9–1.1)
KETONES UR QL STRIP: NEGATIVE
LEUKOCYTE ESTERASE UR QL STRIP.AUTO: NEGATIVE
LYMPHOCYTES # BLD AUTO: 0.82 10*3/MM3 (ref 0.9–4.8)
LYMPHOCYTES NFR BLD AUTO: 8.2 % (ref 19.6–45.3)
MCH RBC QN AUTO: 26.4 PG (ref 26.9–32)
MCHC RBC AUTO-ENTMCNC: 30.5 G/DL (ref 32.4–36.3)
MCV RBC AUTO: 86.3 FL (ref 80.5–98.2)
MONOCYTES # BLD AUTO: 1.6 10*3/MM3 (ref 0.2–1.2)
MONOCYTES NFR BLD AUTO: 15.9 % (ref 5–12)
NEUTROPHILS # BLD AUTO: 7.63 10*3/MM3 (ref 1.9–8.1)
NEUTROPHILS NFR BLD AUTO: 75.8 % (ref 42.7–76)
NITRITE UR QL STRIP: NEGATIVE
PH UR STRIP.AUTO: <=5 [PH] (ref 5–8)
PLATELET # BLD AUTO: 264 10*3/MM3 (ref 140–500)
PMV BLD AUTO: 9.5 FL (ref 6–12)
POTASSIUM BLD-SCNC: 4.7 MMOL/L (ref 3.5–5.2)
PROT SERPL-MCNC: 7 G/DL (ref 6–8.5)
PROT UR QL STRIP: ABNORMAL
PROTHROMBIN TIME: 30 SECONDS (ref 11.7–14.2)
RBC # BLD AUTO: 4.97 10*6/MM3 (ref 3.9–5.2)
RBC # UR: NORMAL /HPF
REF LAB TEST METHOD: NORMAL
SODIUM BLD-SCNC: 135 MMOL/L (ref 136–145)
SP GR UR STRIP: 1.02 (ref 1–1.03)
SQUAMOUS #/AREA URNS HPF: NORMAL /HPF
TROPONIN T SERPL-MCNC: <0.01 NG/ML (ref 0–0.03)
UROBILINOGEN UR QL STRIP: ABNORMAL
WBC NRBC COR # BLD: 10.06 10*3/MM3 (ref 4.5–10.7)
WBC UR QL AUTO: NORMAL /HPF

## 2018-12-22 PROCEDURE — 73030 X-RAY EXAM OF SHOULDER: CPT

## 2018-12-22 PROCEDURE — 81001 URINALYSIS AUTO W/SCOPE: CPT | Performed by: PHYSICIAN ASSISTANT

## 2018-12-22 PROCEDURE — P9612 CATHETERIZE FOR URINE SPEC: HCPCS

## 2018-12-22 PROCEDURE — 93005 ELECTROCARDIOGRAM TRACING: CPT | Performed by: PHYSICIAN ASSISTANT

## 2018-12-22 PROCEDURE — 85025 COMPLETE CBC W/AUTO DIFF WBC: CPT | Performed by: PHYSICIAN ASSISTANT

## 2018-12-22 PROCEDURE — 80162 ASSAY OF DIGOXIN TOTAL: CPT | Performed by: PHYSICIAN ASSISTANT

## 2018-12-22 PROCEDURE — 84484 ASSAY OF TROPONIN QUANT: CPT | Performed by: PHYSICIAN ASSISTANT

## 2018-12-22 PROCEDURE — 80053 COMPREHEN METABOLIC PANEL: CPT | Performed by: PHYSICIAN ASSISTANT

## 2018-12-22 PROCEDURE — 85610 PROTHROMBIN TIME: CPT | Performed by: PHYSICIAN ASSISTANT

## 2018-12-22 PROCEDURE — 70450 CT HEAD/BRAIN W/O DYE: CPT

## 2018-12-22 PROCEDURE — 99284 EMERGENCY DEPT VISIT MOD MDM: CPT

## 2018-12-22 PROCEDURE — 93010 ELECTROCARDIOGRAM REPORT: CPT | Performed by: INTERNAL MEDICINE

## 2018-12-22 RX ORDER — ONDANSETRON 4 MG/1
4 TABLET, FILM COATED ORAL EVERY 6 HOURS
Qty: 15 TABLET | Refills: 0 | Status: ON HOLD | OUTPATIENT
Start: 2018-12-22 | End: 2019-03-14

## 2018-12-22 RX ORDER — ACETAMINOPHEN AND CODEINE PHOSPHATE 300; 30 MG/1; MG/1
1 TABLET ORAL EVERY 6 HOURS PRN
Qty: 15 TABLET | Refills: 0 | Status: SHIPPED | OUTPATIENT
Start: 2018-12-22

## 2018-12-22 RX ORDER — SODIUM CHLORIDE 0.9 % (FLUSH) 0.9 %
10 SYRINGE (ML) INJECTION AS NEEDED
Status: DISCONTINUED | OUTPATIENT
Start: 2018-12-22 | End: 2018-12-23 | Stop reason: HOSPADM

## 2018-12-23 NOTE — ED NOTES
Per pt's daughter, pt fell two nights ago d/t generalized weakness. Pt has had left shoulder/ humerus pain since. Generalized weakness has continued to worsen.      Ynay Nathan, RN  12/22/18 [id number]

## 2018-12-23 NOTE — ED PROVIDER NOTES
MD ATTESTATION NOTE  Pt presents to the ED after she fell a couple of hours ago. Pt states that she was bracing herself on the wall while walking in the dark when the wall ran into an open door, and the pt lost her balance. On exam, Pt is resting comfortably, in no distress, and without focal neurologic deficit. Mild tenderness to the left shoulder with no deformity, cardiovascular is in tact with nml sensation. Plan to get xrays.     The HEVER and I have discussed this patient's history, physical exam, and treatment plan.  I have reviewed the documentation and personally had a face to face interaction with the patient. I affirm the documentation and agree with the treatment and plan.  The attached note describes my personal findings.    Documentation assistance provided by john So for Dr. Ocasio.  Information recorded by the scribe was done at my direction and has been verified and validated by me.       Mahad So  12/22/18 [id number]       Isai Ocasio MD  12/24/18 [id number]

## 2018-12-23 NOTE — DISCHARGE INSTRUCTIONS
Use of a sling for the next 3-5 days.  Follow up with your orthopedist in 2 days as scheduled.  Establish care with a family physician at the number above and follow up for all other concerns.  Return to the ER with any further concerns, should your condition change/worsen, or should you develop a fever.

## 2018-12-23 NOTE — ED PROVIDER NOTES
" EMERGENCY DEPARTMENT ENCOUNTER    CHIEF COMPLAINT  Chief Complaint: generalized weakness  History given by: patient, family   History limited by: nothing   Room Number: 17/17  PMD: Nat Gaston MD      HPI:  Pt is a 87 y.o. female who presents to the ED complaining of generalized weakness the past couple of days as well as L shoulder pain s/p mechanical fall Thursday night 12/20/18 when she went to the bathroom. Pt reports she fell directly on L shoulder. She denies striking her head, LOC, and other injuries. Pts family states she was not in any pain Friday morning but pain developed today and has decreased ROM secondary to pain. Pt denies fever chills, CP, and abd pain.    Duration:  3 days   Onset: gradual  Timing: intermittent   Location: L shoulder  Radiation: none   Quality: \"pain\"  Intensity/Severity: moderate   Progression: unchanged   Associated Symptoms: none mentioned   Aggravating Factors: ROM  Alleviating Factors: none   Previous Episodes: none   Treatment before arrival: none     PAST MEDICAL HISTORY  Active Ambulatory Problems     Diagnosis Date Noted   • Persistent atrial fibrillation (CMS/HCC) 06/26/2017   • Mixed hyperlipidemia 06/26/2017   • Mitral valve insufficiency 06/26/2017   • Pacemaker 06/26/2017   • Sick sinus syndrome (CMS/HCC) 06/26/2017   • Hypothyroid 06/26/2017     Resolved Ambulatory Problems     Diagnosis Date Noted   • No Resolved Ambulatory Problems     Past Medical History:   Diagnosis Date   • Arrhythmia    • Atherosclerosis of native coronary artery of native heart without angina pectoris    • Atrial fibrillation by electrocardiogram (CMS/HCC)    • Fatigue    • Hyperlipidemia, mixed    • Hypertension, benign    • Mitral valve disorder    • Sick sinus syndrome (CMS/HCC)    • Status post placement of cardiac pacemaker        PAST SURGICAL HISTORY  Past Surgical History:   Procedure Laterality Date   • CARDIAC ELECTROPHYSIOLOGY PROCEDURE N/A 5/15/2018    Procedure: PPM generator " change - dual;  Surgeon: Mohan Joy MD;  Location:  [facility];  Service: Cardiovascular   • CORONARY ANGIOPLASTY  2009    stent LAD   • INSERT / REPLACE / REMOVE PACEMAKER         FAMILY HISTORY  Family History   Problem Relation Age of Onset   • Coronary artery disease Mother        SOCIAL HISTORY  Social History     Socioeconomic History   • Marital status:      Spouse name: Not on file   • Number of children: Not on file   • Years of education: Not on file   • Highest education level: Not on file   Social Needs   • Financial resource strain: Not on file   • Food insecurity - worry: Not on file   • Food insecurity - inability: Not on file   • Transportation needs - medical: Not on file   • Transportation needs - non-medical: Not on file   Occupational History   • Not on file   Tobacco Use   • Smoking status: Former Smoker     Last attempt to quit:      Years since quittin.9   • Smokeless tobacco: Never Used   Substance and Sexual Activity   • Alcohol use: No   • Drug use: No   • Sexual activity: No   Other Topics Concern   • Not on file   Social History Narrative   • Not on file       ALLERGIES  Patient has no known allergies.    REVIEW OF SYSTEMS  Review of Systems    PHYSICAL EXAM  ED Triage Vitals [18]   Temp Heart Rate Resp BP SpO2   98.2 °F (36.8 °C) 87 15 -- 95 %      Temp src Heart Rate Source Patient Position BP Location FiO2 (%)   Tympanic -- -- -- --       Physical Exam   Constitutional: She is oriented to person, place, and time. No distress.   HENT:   Head: Normocephalic and atraumatic.   Eyes: EOM are normal. Pupils are equal, round, and reactive to light.   Neck: Normal range of motion. Neck supple.   Cardiovascular: Normal rate, regular rhythm and normal heart sounds.   Pulses:       Radial pulses are 2+ on the right side, and 2+ on the left side.        Dorsalis pedis pulses are 2+ on the right side, and 2+ on the left side.        Posterior  tibial pulses are 2+ on the right side, and 2+ on the left side.   Paced rhythm    Pulmonary/Chest: Effort normal and breath sounds normal. No respiratory distress. She exhibits no tenderness.   Abdominal: Soft. There is no tenderness. There is no rebound and no guarding.   Musculoskeletal: Normal range of motion. She exhibits no edema.   Anterior lateral L shoulder TTP, no obvious deformity, no L arm edema, Legs and hips non-tender w movement. Pain w/ ROM.       Neurological: She is alert and oriented to person, place, and time. She has normal sensation and normal strength.   NVI intact   Skin: Skin is warm and dry. No rash noted.   Psychiatric: Mood and affect normal.   Nursing note and vitals reviewed.      LAB RESULTS  Lab Results (last 24 hours)     Procedure Component Value Units Date/Time    CBC & Differential [[id number]] Collected:  12/22/18 2051    Specimen:  Blood Updated:  12/22/18 2106    Narrative:       The following orders were created for panel order CBC & Differential.  Procedure                               Abnormality         Status                     ---------                               -----------         ------                     CBC Auto Differential[[id number]]        Abnormal            Final result                 Please view results for these tests on the individual orders.    CBC Auto Differential [[id number]]  (Abnormal) Collected:  12/22/18 2051    Specimen:  Blood Updated:  12/22/18 2106     WBC 10.06 10*3/mm3      RBC 4.97 10*6/mm3      Hemoglobin 13.1 g/dL      Hematocrit 42.9 %      MCV 86.3 fL      MCH 26.4 pg      MCHC 30.5 g/dL      RDW 17.5 %      RDW-SD 54.5 fl      MPV 9.5 fL      Platelets 264 10*3/mm3      Neutrophil % 75.8 %      Lymphocyte % 8.2 %      Monocyte % 15.9 %      Eosinophil % 0.0 %      Basophil % 0.1 %      Immature Grans % 0.0 %      Neutrophils, Absolute 7.63 10*3/mm3      Lymphocytes, Absolute 0.82 10*3/mm3      Monocytes, Absolute 1.60 10*3/mm3       Eosinophils, Absolute 0.00 10*3/mm3      Basophils, Absolute 0.01 10*3/mm3      Immature Grans, Absolute 0.00 10*3/mm3     Comprehensive Metabolic Panel [[id number]]  (Abnormal) Collected:  12/22/18 2052    Specimen:  Blood Updated:  12/22/18 2129     Glucose 196 mg/dL      BUN 19 mg/dL      Creatinine 0.97 mg/dL      Sodium 135 mmol/L      Potassium 4.7 mmol/L      Chloride 96 mmol/L      CO2 24.3 mmol/L      Calcium 10.0 mg/dL      Total Protein 7.0 g/dL      Albumin 3.80 g/dL      ALT (SGPT) 16 U/L      AST (SGOT) 29 U/L      Alkaline Phosphatase 111 U/L      Total Bilirubin 1.3 mg/dL      eGFR Non African Amer 54 mL/min/1.73      Globulin 3.2 gm/dL      A/G Ratio 1.2 g/dL      BUN/Creatinine Ratio 19.6     Anion Gap 14.7 mmol/L     Narrative:       The MDRD GFR formula is only valid for adults with stable renal function between ages 18 and 70.    Troponin [[id number]]  (Normal) Collected:  12/22/18 2052    Specimen:  Blood Updated:  12/22/18 2129     Troponin T <0.010 ng/mL     Narrative:       Troponin T Reference Ranges:  Less than 0.03 ng/mL:    Negative for AMI  0.03 to 0.09 ng/mL:      Indeterminant for AMI  Greater than 0.09 ng/mL: Positive for AMI    Protime-INR [[id number]]  (Abnormal) Collected:  12/22/18 2052    Specimen:  Blood Updated:  12/22/18 2115     Protime 30.0 Seconds      INR 2.92    Digoxin Level [[id number]]  (Normal) Collected:  12/22/18 2052    Specimen:  Blood Updated:  12/22/18 2130     Digoxin 1.00 ng/mL     Urinalysis With Microscopic If Indicated (No Culture) - Urine, Catheter [[id number]]  (Abnormal) Collected:  12/22/18 2120    Specimen:  Urine, Catheter Updated:  12/22/18 2135     Color, UA Dark Yellow     Appearance, UA Clear     pH, UA <=5.0     Specific Gravity, UA 1.021     Glucose, UA Negative     Ketones, UA Negative     Bilirubin, UA Negative     Blood, UA Negative     Protein,  mg/dL (2+)     Leuk Esterase, UA Negative     Nitrite, UA Negative     Urobilinogen, UA 1.0  E.U./dL    Urinalysis, Microscopic Only - Urine, Catheter [[id number]] Collected:  12/22/18 2120    Specimen:  Urine, Catheter Updated:  12/22/18 2135     RBC, UA 0-2 /HPF      WBC, UA 0-2 /HPF      Bacteria, UA None Seen /HPF      Squamous Epithelial Cells, UA 0-2 /HPF      Hyaline Casts, UA 3-6 /LPF      Methodology Automated Microscopy          I ordered the above labs and reviewed the results    RADIOLOGY  CT Head Without Contrast   Final Result   1. No acute intracranial abnormality.                           This report was finalized on 12/22/2018 9:19 PM by Alf Armstrong M.D.          XR Shoulder 2+ View Left   Final Result   1. No acute osseous abnormality.           This report was finalized on 12/22/2018 9:12 PM by Alf Armstrong M.D.               I ordered the above noted radiological studies. Interpreted by radiologist. Reviewed by me in PACS.       PROCEDURES  Procedures  EKG          EKG time: 2040  Rhythm/Rate: Ventricular paced at 60     Interpreted Contemporaneously by me, independently viewed  No prior comparison. (no acute ST changes)      PROGRESS AND CONSULTS      2022  Ordered IVF     2050  Ordered CT head and L shoulder XR for further evaluation     2130  Ordered urinalysis for further evaluation    2229  Rechecked pt. Pt is resting comfortably who appears to be in NAD. Notified pt of lab results and imaging including cardiac enzyme results and EKG. Discussed the plan to have pt follow up with a family doctor. Will prescribe pain medication. Discussed plan to discharge. Pt understands and agrees with the plan, all questions answered.      MEDICAL DECISION MAKING  Results were reviewed/discussed with the patient and they were also made aware of online access. Pt also made aware that some labs, such as cultures, will not be resulted during ER visit and follow up with PMD is necessary.     MDM  Number of Diagnoses or Management Options     Amount and/or Complexity of Data Reviewed  Clinical lab  tests: ordered and reviewed  Tests in the radiology section of CPT®: ordered and reviewed (CT head= no acute intracranial abnormality)  Tests in the medicine section of CPT®: ordered and reviewed (See EKG procedure note)  Decide to obtain previous medical records or to obtain history from someone other than the patient: yes  Review and summarize past medical records: yes           DIAGNOSIS  Final diagnoses:   Fall, initial encounter   Contusion of left shoulder, initial encounter   On warfarin at home       DISPOSITION  DISCHARGE    Patient discharged in stable condition.    Reviewed implications of results, diagnosis, meds, responsibility to follow up, warning signs and symptoms of possible worsening, potential complications and reasons to return to ER.    Patient/Family voiced understanding of above instructions.    Discussed plan for discharge, as there is no emergent indication for admission. Patient referred to primary care provider for BP management due to today's BP. Pt/family is agreeable and understands need for follow up and repeat testing.  Pt is aware that discharge does not mean that nothing is wrong but it indicates no emergency is present that requires admission and they must continue care with follow-up as given below or physician of their choice.     FOLLOW-UP  PATIENT LIAISON [place] [id number] [phone]  Schedule an appointment as soon as possible for a visit   For further evaluation and treatment    Your Orthopedist    In 2 days  For further evaluation and treatment as scheduled         Medication List      No changes were made to your prescriptions during this visit.           Latest Documented Vital Signs:  As of 11:00 PM  BP- 135/92 HR- 87 Temp- 98.2 °F (36.8 °C) (Tympanic) O2 sat- 96%    --  Documentation assistance provided by john Mar and Bonnie Acevedo for Valentino Lizama PA-C.  Information recorded by the john was done at my direction and has been verified  and validated by me.     Bonnie Acevedo  12/22/18 2889       Anderson Lizama III, PA  12/23/18 2086

## 2018-12-26 RX ORDER — WARFARIN SODIUM 5 MG/1
TABLET ORAL
Qty: 90 TABLET | Refills: 3 | Status: SHIPPED | OUTPATIENT
Start: 2018-12-26

## 2018-12-27 ENCOUNTER — ANTI-COAG VISIT (OUTPATIENT)
Dept: INTERNAL MEDICINE | Age: 83
End: 2018-12-27

## 2018-12-27 LAB — INR BLD: 2.9

## 2019-01-02 ENCOUNTER — ANTI-COAG VISIT (OUTPATIENT)
Dept: INTERNAL MEDICINE | Age: 84
End: 2019-01-02

## 2019-01-02 LAB — INR BLD: 3.2

## 2019-01-09 ENCOUNTER — ANTI-COAG VISIT (OUTPATIENT)
Dept: INTERNAL MEDICINE | Age: 84
End: 2019-01-09

## 2019-01-09 LAB — INR BLD: 3.2

## 2019-01-11 RX ORDER — POTASSIUM CHLORIDE 20 MEQ/1
20 TABLET, EXTENDED RELEASE ORAL DAILY
Qty: 30 TABLET | Refills: 5 | Status: SHIPPED | OUTPATIENT
Start: 2019-01-11 | End: 2019-07-10 | Stop reason: SDUPTHER

## 2019-01-23 LAB — INR PPP: 2.6

## 2019-01-24 ENCOUNTER — ANTICOAGULATION VISIT (OUTPATIENT)
Dept: PHARMACY | Facility: HOSPITAL | Age: 84
End: 2019-01-24

## 2019-01-24 DIAGNOSIS — I48.19 PERSISTENT ATRIAL FIBRILLATION (HCC): ICD-10-CM

## 2019-01-24 NOTE — PROGRESS NOTES
Anticoagulation Clinic Progress Note    Anticoagulation Summary  As of 2019    INR goal:   2.0-3.0   TTR:   --   INR used for dosin.60 (2019)   Warfarin maintenance plan:   2.5 mg every day   Weekly warfarin total:   17.5 mg   Plan last modified:   Josephine Gaitan RPH (2019)   Next INR check:   2019   Target end date:   Indefinite    Indications    Persistent atrial fibrillation (CMS/HCC) [I48.1]             Anticoagulation Episode Summary     INR check location:       Preferred lab:       Send INR reminders to:   [facility]  POOL    Comments:         Anticoagulation Care Providers     Provider Role Specialty Phone number    Ike Rodriguez MD Referring Cardiology [phone]          Clinic Interview:  No pertinent clinical findings have been reported.    INR History:  Anticoagulation Monitoring 2019   INR 2.60   INR Date 2019   INR Goal 2.0-3.0   Last Week Total 0 mg   Next Week Total 17.5 mg   Sun 2.5 mg   Mon 2.5 mg   Tue 2.5 mg   Wed -   Thu 2.5 mg   Fri 2.5 mg   Sat 2.5 mg   Visit Report -       Plan:  1. INR is therapeutic today- see above in Anticoagulation Summary.    Tala DANIEL Bethea to continue their warfarin regimen- see above in Anticoagulation Summary.  2. Follow up in 1 week (pt tests every Wed per daughter.  3. Pt has agreed to only be called if INR out of range. They have been instructed to call if any changes in medications, doses, concerns, etc. Patient expresses understanding and has no further questions at this time. Spoke with Vane, daughter who is patient's caregiver for home warfarin testing.     Josephine Gaitan RPH

## 2019-01-30 ENCOUNTER — ANTI-COAG VISIT (OUTPATIENT)
Dept: INTERNAL MEDICINE | Age: 84
End: 2019-01-30

## 2019-01-30 LAB — INR PPP: 2.9

## 2019-01-31 ENCOUNTER — ANTICOAGULATION VISIT (OUTPATIENT)
Dept: PHARMACY | Facility: HOSPITAL | Age: 84
End: 2019-01-31

## 2019-01-31 DIAGNOSIS — I48.19 PERSISTENT ATRIAL FIBRILLATION (HCC): ICD-10-CM

## 2019-01-31 NOTE — PROGRESS NOTES
Anticoagulation Clinic Progress Note    Anticoagulation Summary  As of 2019    INR goal:   2.0-3.0   TTR:   --   INR used for dosin.90 (2019)   Warfarin maintenance plan:   2.5 mg every day   Weekly warfarin total:   17.5 mg   No change documented:   Arya Choudhary RPH   Plan last modified:   Josephine Gaitan RPH (2019)   Next INR check:   2019   Priority:   Maintenance   Target end date:   Indefinite    Indications    Persistent atrial fibrillation (CMS/HCC) [I48.1]             Anticoagulation Episode Summary     INR check location:       Preferred lab:       Send INR reminders to:    [facility]  POOL    Comments:   **[facility]**      Anticoagulation Care Providers     Provider Role Specialty Phone number    Ike Rodriguez MD Referring Cardiology [phone]          Drug interactions: has remained unchanged.  Diet: has remained unchanged.    Clinic Interview:  No pertinent clinical findings have been reported.    INR History:  Anticoagulation Monitoring 2019   INR 2.60 2.90   INR Date 2019   INR Goal 2.0-3.0 2.0-3.0   Trend - Same   Last Week Total 0 mg 17.5 mg   Next Week Total 17.5 mg 17.5 mg   Sun 2.5 mg 2.5 mg   Mon 2.5 mg 2.5 mg   Tue 2.5 mg 2.5 mg   Wed - 2.5 mg   Thu 2.5 mg 2.5 mg   Fri 2.5 mg 2.5 mg   Sat 2.5 mg 2.5 mg   Visit Report - -       Plan:  1. INR is Therapeutic today- see above in Anticoagulation Summary.   Will instruct Tala Bethea to Continue their warfarin regimen- see above in Anticoagulation Summary.  2. Follow up in 1 week  3. Pt has agreed to only be called if INR out of range. They have been instructed to call if any changes in medications, doses, concerns, etc. Patient expresses understanding and has no further questions at this time.    Arya Choudhary RPH

## 2019-02-06 ENCOUNTER — ANTICOAGULATION VISIT (OUTPATIENT)
Dept: PHARMACY | Facility: HOSPITAL | Age: 84
End: 2019-02-06

## 2019-02-06 DIAGNOSIS — I48.19 PERSISTENT ATRIAL FIBRILLATION (HCC): ICD-10-CM

## 2019-02-06 LAB — INR PPP: 4.4

## 2019-02-06 NOTE — PROGRESS NOTES
Anticoagulation Clinic Progress Note    Anticoagulation Summary  As of 2019    INR goal:   2.0-3.0   TTR:   0.0 % (3 d)   INR used for dosin.40! (2019)   Warfarin maintenance plan:   2.5 mg every day   Weekly warfarin total:   17.5 mg   Plan last modified:   Arya Choudhary RPH (2019)   Next INR check:   2019   Priority:   Maintenance   Target end date:   Indefinite    Indications    Persistent atrial fibrillation (CMS/HCC) [I48.1]             Anticoagulation Episode Summary     INR check location:       Preferred lab:       Send INR reminders to:    [facility]  POOL    Comments:   **[facility]**      Anticoagulation Care Providers     Provider Role Specialty Phone number    Ike Rodriguez MD Referring Cardiology [phone]          Drug interactions: has remained unchanged.  Diet: has remained unchanged.    Clinic Interview:  No pertinent clinical findings have been reported.    INR History:  Anticoagulation Monitoring 2019   INR 2.60 2.90 4.40   INR Date 2019   INR Goal 2.0-3.0 2.0-3.0 2.0-3.0   Trend - Same Same   Last Week Total 0 mg 17.5 mg 17.5 mg   Next Week Total 17.5 mg 17.5 mg 15 mg   Sun 2.5 mg 2.5 mg 2.5 mg   Mon 2.5 mg 2.5 mg 2.5 mg   Tue 2.5 mg 2.5 mg 2.5 mg   Wed - 2.5 mg Hold ()   Thu 2.5 mg 2.5 mg 2.5 mg   Fri 2.5 mg 2.5 mg 2.5 mg   Sat 2.5 mg 2.5 mg 2.5 mg   Visit Report - - -   Some recent data might be hidden       Plan:  1. INR is Supratherapeutic today- see above in Anticoagulation Summary.   Will instruct Tala Bethea to Change their warfarin regimen- see above in Anticoagulation Summary.  2. Follow up in 1 week  3. Pt has agreed to only be called if INR out of range. They have been instructed to call if any changes in medications, doses, concerns, etc. Patient expresses understanding and has no further questions at this time.    Arya Choudhary RPH

## 2019-02-07 ENCOUNTER — HOSPITAL ENCOUNTER (EMERGENCY)
Facility: HOSPITAL | Age: 84
Discharge: HOME | End: 2019-02-07
Attending: EMERGENCY MEDICINE | Admitting: EMERGENCY MEDICINE

## 2019-02-07 VITALS
OXYGEN SATURATION: 95 % | WEIGHT: 120 LBS | TEMPERATURE: 97.9 F | BODY MASS INDEX: 21.26 KG/M2 | HEIGHT: 63 IN | HEART RATE: 60 BPM | SYSTOLIC BLOOD PRESSURE: 147 MMHG | DIASTOLIC BLOOD PRESSURE: 97 MMHG | RESPIRATION RATE: 16 BRPM

## 2019-02-07 DIAGNOSIS — T45.515A BLEEDING ON COUMADIN: Primary | ICD-10-CM

## 2019-02-07 DIAGNOSIS — H11.31 NON-TRAUMATIC SUBCONJUNCTIVAL HEMORRHAGE OF RIGHT EYE: ICD-10-CM

## 2019-02-07 DIAGNOSIS — R58 BLEEDING ON COUMADIN: Primary | ICD-10-CM

## 2019-02-07 LAB
APTT PPP: 47.2 SECONDS (ref 22.7–35.4)
BASOPHILS # BLD AUTO: 0.03 10*3/MM3 (ref 0–0.2)
BASOPHILS NFR BLD AUTO: 0.5 % (ref 0–1.5)
DEPRECATED RDW RBC AUTO: 59.4 FL (ref 37–54)
EOSINOPHIL # BLD AUTO: 0.05 10*3/MM3 (ref 0–0.7)
EOSINOPHIL NFR BLD AUTO: 0.8 % (ref 0.3–6.2)
ERYTHROCYTE [DISTWIDTH] IN BLOOD BY AUTOMATED COUNT: 20 % (ref 11.7–13)
HCT VFR BLD AUTO: 40.3 % (ref 35.6–45.5)
HGB BLD-MCNC: 12.6 G/DL (ref 11.9–15.5)
IMM GRANULOCYTES # BLD AUTO: 0.01 10*3/MM3 (ref 0–0.03)
IMM GRANULOCYTES NFR BLD AUTO: 0.2 % (ref 0–0.5)
INR PPP: 4.52 (ref 0.9–1.1)
LYMPHOCYTES # BLD AUTO: 1.35 10*3/MM3 (ref 0.9–4.8)
LYMPHOCYTES NFR BLD AUTO: 20.6 % (ref 19.6–45.3)
MCH RBC QN AUTO: 25.6 PG (ref 26.9–32)
MCHC RBC AUTO-ENTMCNC: 31.3 G/DL (ref 32.4–36.3)
MCV RBC AUTO: 81.9 FL (ref 80.5–98.2)
MONOCYTES # BLD AUTO: 0.62 10*3/MM3 (ref 0.2–1.2)
MONOCYTES NFR BLD AUTO: 9.5 % (ref 5–12)
NEUTROPHILS # BLD AUTO: 4.5 10*3/MM3 (ref 1.9–8.1)
NEUTROPHILS NFR BLD AUTO: 68.6 % (ref 42.7–76)
PLATELET # BLD AUTO: 255 10*3/MM3 (ref 140–500)
PMV BLD AUTO: 9.9 FL (ref 6–12)
PROTHROMBIN TIME: 42.2 SECONDS (ref 11.7–14.2)
RBC # BLD AUTO: 4.92 10*6/MM3 (ref 3.9–5.2)
WBC NRBC COR # BLD: 6.55 10*3/MM3 (ref 4.5–10.7)

## 2019-02-07 PROCEDURE — 85025 COMPLETE CBC W/AUTO DIFF WBC: CPT | Performed by: EMERGENCY MEDICINE

## 2019-02-07 PROCEDURE — 85610 PROTHROMBIN TIME: CPT | Performed by: EMERGENCY MEDICINE

## 2019-02-07 PROCEDURE — 99284 EMERGENCY DEPT VISIT MOD MDM: CPT

## 2019-02-07 PROCEDURE — 85730 THROMBOPLASTIN TIME PARTIAL: CPT | Performed by: EMERGENCY MEDICINE

## 2019-02-07 RX ORDER — SODIUM CHLORIDE 0.9 % (FLUSH) 0.9 %
10 SYRINGE (ML) INJECTION AS NEEDED
Status: DISCONTINUED | OUTPATIENT
Start: 2019-02-07 | End: 2019-02-07 | Stop reason: HOSPADM

## 2019-02-07 NOTE — ED PROVIDER NOTES
EMERGENCY DEPARTMENT ENCOUNTER    CHIEF COMPLAINT  Chief Complaint: abnormal bleeding-right eye  History given by: pt and family  History limited by: none  Room Number: 07/07  PMD: Ike Rodriguez MD      HPI:  Pt is a 88 y.o. female who presents complaining of bruising and bleeding to her right eye and surrounding areas that she noticed this AM when she woke up. Pt states that her right eye feels full. Pt denies vision changes, recent fall, head injury, or trauma. Pt denies any other abnormal bleeding at this time. Family at bedside states pt had cataract surgery several years ago. Pt states she is on Warfarin. Pt denies any medication changes.     Duration:  Since this AM  Onset: gradual  Timing: constant  Location: right eye  Quality: fullness  Intensity/Severity: moderate  Progression: unchanged  Associated Symptoms: none  Previous Episodes: Pt is on Warfarin.   Treatment before arrival: none    PAST MEDICAL HISTORY  Active Ambulatory Problems     Diagnosis Date Noted   • Persistent atrial fibrillation (CMS/HCC) 06/26/2017   • Mixed hyperlipidemia 06/26/2017   • Mitral valve insufficiency 06/26/2017   • Pacemaker 06/26/2017   • Sick sinus syndrome (CMS/HCC) 06/26/2017   • Hypothyroid 06/26/2017     Resolved Ambulatory Problems     Diagnosis Date Noted   • No Resolved Ambulatory Problems     Past Medical History:   Diagnosis Date   • Arrhythmia    • Atherosclerosis of native coronary artery of native heart without angina pectoris    • Atrial fibrillation by electrocardiogram (CMS/HCC)    • Fatigue    • Hyperlipidemia, mixed    • Hypertension, benign    • Mitral valve disorder    • Sick sinus syndrome (CMS/HCC)    • Status post placement of cardiac pacemaker        PAST SURGICAL HISTORY  Past Surgical History:   Procedure Laterality Date   • CARDIAC ELECTROPHYSIOLOGY PROCEDURE N/A 5/15/2018    Procedure: PPM generator change - dual;  Surgeon: Mohan Joy MD;  Location: [facility] CATH INVASIVE LOCATION;  Service:  Cardiovascular   • CORONARY ANGIOPLASTY  2009    stent LAD   • INSERT / REPLACE / REMOVE PACEMAKER         FAMILY HISTORY  Family History   Problem Relation Age of Onset   • Coronary artery disease Mother        SOCIAL HISTORY  Social History     Socioeconomic History   • Marital status:      Spouse name: Not on file   • Number of children: Not on file   • Years of education: Not on file   • Highest education level: Not on file   Social Needs   • Financial resource strain: Not on file   • Food insecurity - worry: Not on file   • Food insecurity - inability: Not on file   • Transportation needs - medical: Not on file   • Transportation needs - non-medical: Not on file   Occupational History   • Not on file   Tobacco Use   • Smoking status: Former Smoker     Last attempt to quit:      Years since quittin.1   • Smokeless tobacco: Never Used   Substance and Sexual Activity   • Alcohol use: No   • Drug use: No   • Sexual activity: No   Other Topics Concern   • Not on file   Social History Narrative   • Not on file       ALLERGIES  Patient has no known allergies.    REVIEW OF SYSTEMS  Review of Systems   HENT:        (-0 head injury   Eyes:        (+) bruising and bleeding to and around the right eye  (-) vision changes       PHYSICAL EXAM  ED Triage Vitals [19 1314]   Temp Heart Rate Resp BP SpO2   97.9 °F (36.6 °C) 63 18 121/89 97 %      Temp src Heart Rate Source Patient Position BP Location FiO2 (%)   Tympanic -- -- -- --       Physical Exam   Constitutional: She is oriented to person, place, and time.   awake   Eyes: Right eye visual fields normal.   Right orbit-severe subconjunctival hemorrhage, swelling up to the edge of the cornea, no hyphema, EOMI, vision and visual fields intact   Neurological: She is alert and oriented to person, place, and time. GCS score is 15.   Nursing note and vitals reviewed.      LAB RESULTS  Lab Results (last 24 hours)     Procedure Component Value Units  Date/Time    CBC & Differential [[id number]] Collected:  02/07/19 1330    Specimen:  Blood Updated:  02/07/19 1340    Narrative:       The following orders were created for panel order CBC & Differential.  Procedure                               Abnormality         Status                     ---------                               -----------         ------                     CBC Auto Differential[[id number]]        Abnormal            Final result                 Please view results for these tests on the individual orders.    Protime-INR [[id number]]  (Abnormal) Collected:  02/07/19 1330    Specimen:  Blood Updated:  02/07/19 1411     Protime 42.2 Seconds      INR 4.52    aPTT [[id number]]  (Abnormal) Collected:  02/07/19 1330    Specimen:  Blood Updated:  02/07/19 1411     PTT 47.2 seconds     CBC Auto Differential [[id number]]  (Abnormal) Collected:  02/07/19 1330    Specimen:  Blood Updated:  02/07/19 1340     WBC 6.55 10*3/mm3      RBC 4.92 10*6/mm3      Hemoglobin 12.6 g/dL      Hematocrit 40.3 %      MCV 81.9 fL      MCH 25.6 pg      MCHC 31.3 g/dL      RDW 20.0 %      RDW-SD 59.4 fl      MPV 9.9 fL      Platelets 255 10*3/mm3      Neutrophil % 68.6 %      Lymphocyte % 20.6 %      Monocyte % 9.5 %      Eosinophil % 0.8 %      Basophil % 0.5 %      Immature Grans % 0.2 %      Neutrophils, Absolute 4.50 10*3/mm3      Lymphocytes, Absolute 1.35 10*3/mm3      Monocytes, Absolute 0.62 10*3/mm3      Eosinophils, Absolute 0.05 10*3/mm3      Basophils, Absolute 0.03 10*3/mm3      Immature Grans, Absolute 0.01 10*3/mm3           I ordered the above labs and reviewed the results      PROGRESS AND CONSULTS     1601-Informed pt and family of INR-4.52 which likely caused her bleeding and bruising this AM. Family at bedside states that they check her INR level at home. Informed family to hold Warfarin and check the level in 2 days. Informed pt and family to restart Warfarin when INR is below 3. Discussed plan to consult with  ophthalmology.The patient and family indicates understanding of these issues and agrees with the plan.    1605-Placed call to ophthalmology for consult.     1628-Discussed pt case with Dr. Ortiz (ophthalmology) who states pt is safe for discharge and as for f/u as needed.     1630-Rechecked pt. Pt is resting comfortably. Notified pt of my consult with  (ophthalmology). Discussed the plan to discharge the pt home. I instructed the pt to hold Warfarin as discussed and f/u with  as needed. Informed pt and family that healing can take severe weeks.  Pt understands and agrees with the plan, all questions answered.      MEDICAL DECISION MAKING  Results were reviewed/discussed with the patient and they were also made aware of online access. Pt also made aware that some labs, such as cultures, will not be resulted during ER visit and follow up with PMD is necessary.     MDM       DIAGNOSIS  Final diagnoses:   Bleeding on Coumadin   Non-traumatic subconjunctival hemorrhage of right eye       DISPOSITION  DISCHARGE    Patient discharged in stable condition.    Reviewed implications of results, diagnosis, meds, responsibility to follow up, warning signs and symptoms of possible worsening, potential complications and reasons to return to ER.    Patient/Family voiced understanding of above instructions.    Discussed plan for discharge, as there is no emergent indication for admission. Patient referred to primary care provider for BP management due to today's BP. Pt/family is agreeable and understands need for follow up and repeat testing.  Pt is aware that discharge does not mean that nothing is wrong but it indicates no emergency is present that requires admission and they must continue care with follow-up as given below or physician of their choice.     FOLLOW-UP  Matti Ortiz Jr., MD  [facility] [address] [phone]      As needed    [facility] Emergency Department  [address]  [facility] [address] [phone]    As needed, If symptoms worsen         Medication List      Stop    warfarin 5 MG tablet  Commonly known as:  COUMADIN              Latest Documented Vital Signs:  As of 4:58 PM  BP- 147/97 HR- 60 Temp- 97.9 °F (36.6 °C) (Tympanic) O2 sat- 95%    --  Documentation assistance provided by john Everett for MD Rickey.  Information recorded by the scribe was done at my direction and has been verified and validated by me.     Samantha Everett  02/07/19 5624       Roly Munoz MD  02/07/19 5048

## 2019-02-07 NOTE — ED NOTES
Patient reports she woke up this morning with bruising/bleeding in and around her right eye. Patient denies injury, reports she takes Coumadin. Denies vision changes.       Genny Pritchett, RN  02/07/19 5508

## 2019-02-11 ENCOUNTER — ANTICOAGULATION VISIT (OUTPATIENT)
Dept: PHARMACY | Facility: HOSPITAL | Age: 84
End: 2019-02-11

## 2019-02-11 DIAGNOSIS — I48.19 PERSISTENT ATRIAL FIBRILLATION (HCC): ICD-10-CM

## 2019-02-11 LAB — INR PPP: 2.1

## 2019-02-11 RX ORDER — WARFARIN SODIUM 2.5 MG/1
TABLET ORAL
Qty: 90 TABLET | Refills: 0 | Status: ON HOLD | OUTPATIENT
Start: 2019-02-11 | End: 2019-03-15 | Stop reason: SDUPTHER

## 2019-02-11 NOTE — PROGRESS NOTES
Anticoagulation Clinic Progress Note    Anticoagulation Summary  As of 2019    INR goal:   2.0-3.0   TTR:   16.0 % (1.1 wk)   INR used for dosin.10 (2019)   Warfarin maintenance plan:   1.25 mg on Tue, Thu, Sat; 2.5 mg all other days   Weekly warfarin total:   13.75 mg   Plan last modified:   Arya Choudhary [facility] (2019)   Next INR check:   2/15/2019   Priority:   Maintenance   Target end date:   Indefinite    Indications    Persistent atrial fibrillation (CMS/HCC) [I48.1]             Anticoagulation Episode Summary     INR check location:       Preferred lab:       Send INR reminders to:   [facility] [employer]  POOL    Comments:   **[facility]**      Anticoagulation Care Providers     Provider Role Specialty Phone number    Ike Rodriguez MD Referring Cardiology [phone]          Drug interactions: has remained unchanged.  Diet: has remained unchanged.    Clinic Interview:  No pertinent clinical findings have been reported.    INR History:  Anticoagulation Monitoring 2019   INR 2.90 4.40 2.10   INR Date 2019   INR Goal 2.0-3.0 2.0-3.0 2.0-3.0   Trend Same Same Down   Last Week Total 17.5 mg 17.5 mg 5 mg   Next Week Total 17.5 mg 15 mg 13.75 mg   Sun 2.5 mg 2.5 mg -   Mon 2.5 mg 2.5 mg 2.5 mg   Tue 2.5 mg 2.5 mg 1.25 mg   Wed 2.5 mg Hold () 2.5 mg   Thu 2.5 mg 2.5 mg 1.25 mg   Fri 2.5 mg 2.5 mg -   Sat 2.5 mg 2.5 mg -   Visit Report - - -   Some recent data might be hidden       Plan:  1. INR is Therapeutic today- see above in Anticoagulation Summary.   Will instruct Tala Bethea to Change their warfarin regimen- see above in Anticoagulation Summary.  2. Follow up in 5 days  3. Pt has agreed to only be called if INR out of range. They have been instructed to call if any changes in medications, doses, concerns, etc. Patient expresses understanding and has no further questions at this time.    Arya Choudhary RPH

## 2019-02-14 ENCOUNTER — APPOINTMENT (OUTPATIENT)
Dept: CT IMAGING | Facility: HOSPITAL | Age: 84
End: 2019-02-14

## 2019-02-14 ENCOUNTER — HOSPITAL ENCOUNTER (EMERGENCY)
Facility: HOSPITAL | Age: 84
Discharge: HOME | End: 2019-02-14
Attending: EMERGENCY MEDICINE | Admitting: EMERGENCY MEDICINE

## 2019-02-14 VITALS
RESPIRATION RATE: 16 BRPM | OXYGEN SATURATION: 99 % | BODY MASS INDEX: 23.37 KG/M2 | HEART RATE: 66 BPM | TEMPERATURE: 97.4 F | WEIGHT: 127 LBS | SYSTOLIC BLOOD PRESSURE: 165 MMHG | DIASTOLIC BLOOD PRESSURE: 108 MMHG | HEIGHT: 62 IN
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
## 2019-02-14 DIAGNOSIS — S00.93XA CONTUSION OF HEAD, UNSPECIFIED PART OF HEAD, INITIAL ENCOUNTER: ICD-10-CM

## 2019-02-14 LAB
ALBUMIN SERPL-MCNC: 3.4 G/DL (ref 3.5–5.2)
ALBUMIN/GLOB SERPL: 1 G/DL
ALP SERPL-CCNC: 99 U/L (ref 39–117)
ALT SERPL W P-5'-P-CCNC: 14 U/L (ref 1–33)
ANION GAP SERPL CALCULATED.3IONS-SCNC: 11.5 MMOL/L
APTT PPP: 38.4 SECONDS (ref 22.7–35.4)
AST SERPL-CCNC: 23 U/L (ref 1–32)
BASOPHILS # BLD AUTO: 0.02 10*3/MM3 (ref 0–0.2)
BASOPHILS NFR BLD AUTO: 0.3 % (ref 0–1.5)
BILIRUB SERPL-MCNC: 1.4 MG/DL (ref 0.1–1.2)
BUN BLD-MCNC: 14 MG/DL (ref 8–23)
BUN/CREAT SERPL: 16.7 (ref 7–25)
CALCIUM SPEC-SCNC: 9.6 MG/DL (ref 8.6–10.5)
CHLORIDE SERPL-SCNC: 101 MMOL/L (ref 98–107)
CO2 SERPL-SCNC: 28.5 MMOL/L (ref 22–29)
CREAT BLD-MCNC: 0.84 MG/DL (ref 0.57–1)
DEPRECATED RDW RBC AUTO: 62.8 FL (ref 37–54)
EOSINOPHIL # BLD AUTO: 0.06 10*3/MM3 (ref 0–0.4)
EOSINOPHIL NFR BLD AUTO: 0.8 % (ref 0.3–6.2)
ERYTHROCYTE [DISTWIDTH] IN BLOOD BY AUTOMATED COUNT: 21.5 % (ref 12.3–15.4)
GFR SERPL CREATININE-BSD FRML MDRD: 64 ML/MIN/1.73
GLOBULIN UR ELPH-MCNC: 3.4 GM/DL
GLUCOSE BLD-MCNC: 122 MG/DL (ref 65–99)
HCT VFR BLD AUTO: 42.3 % (ref 34–46.6)
HGB BLD-MCNC: 12.9 G/DL (ref 12–15.9)
HOLD SPECIMEN: NORMAL
HOLD SPECIMEN: NORMAL
IMM GRANULOCYTES # BLD AUTO: 0.03 10*3/MM3 (ref 0–0.05)
IMM GRANULOCYTES NFR BLD AUTO: 0.4 % (ref 0–0.5)
INR PPP: 2.33 (ref 0.9–1.1)
LYMPHOCYTES # BLD AUTO: 1.08 10*3/MM3 (ref 0.7–3.1)
LYMPHOCYTES NFR BLD AUTO: 15.2 % (ref 19.6–45.3)
MCH RBC QN AUTO: 25 PG (ref 26.6–33)
MCHC RBC AUTO-ENTMCNC: 30.5 G/DL (ref 31.5–35.7)
MCV RBC AUTO: 81.8 FL (ref 79–97)
MONOCYTES # BLD AUTO: 0.82 10*3/MM3 (ref 0.1–0.9)
MONOCYTES NFR BLD AUTO: 11.5 % (ref 5–12)
NEUTROPHILS # BLD AUTO: 5.1 10*3/MM3 (ref 1.4–7)
NEUTROPHILS NFR BLD AUTO: 71.8 % (ref 42.7–76)
NRBC BLD AUTO-RTO: 0.1 /100 WBC (ref 0–0)
PLATELET # BLD AUTO: 270 10*3/MM3 (ref 140–450)
PMV BLD AUTO: 9.7 FL (ref 6–12)
POTASSIUM BLD-SCNC: 4.4 MMOL/L (ref 3.5–5.2)
PROT SERPL-MCNC: 6.8 G/DL (ref 6–8.5)
PROTHROMBIN TIME: 25.1 SECONDS (ref 11.7–14.2)
RBC # BLD AUTO: 5.17 10*6/MM3 (ref 3.77–5.28)
SODIUM BLD-SCNC: 141 MMOL/L (ref 136–145)
WBC NRBC COR # BLD: 7.11 10*3/MM3 (ref 3.4–10.8)
WHOLE BLOOD HOLD SPECIMEN: NORMAL
WHOLE BLOOD HOLD SPECIMEN: NORMAL

## 2019-02-14 PROCEDURE — 85025 COMPLETE CBC W/AUTO DIFF WBC: CPT | Performed by: EMERGENCY MEDICINE

## 2019-02-14 PROCEDURE — 96360 HYDRATION IV INFUSION INIT: CPT

## 2019-02-14 PROCEDURE — 80053 COMPREHEN METABOLIC PANEL: CPT | Performed by: EMERGENCY MEDICINE

## 2019-02-14 PROCEDURE — 85610 PROTHROMBIN TIME: CPT | Performed by: EMERGENCY MEDICINE

## 2019-02-14 PROCEDURE — 85730 THROMBOPLASTIN TIME PARTIAL: CPT | Performed by: EMERGENCY MEDICINE

## 2019-02-14 PROCEDURE — 99284 EMERGENCY DEPT VISIT MOD MDM: CPT

## 2019-02-14 PROCEDURE — 70450 CT HEAD/BRAIN W/O DYE: CPT

## 2019-02-14 RX ORDER — SODIUM CHLORIDE 0.9 % (FLUSH) 0.9 %
10 SYRINGE (ML) INJECTION AS NEEDED
Status: DISCONTINUED | OUTPATIENT
Start: 2019-02-14 | End: 2019-02-15 | Stop reason: HOSPADM

## 2019-02-14 RX ADMIN — SODIUM CHLORIDE 1000 ML: 9 INJECTION, SOLUTION INTRAVENOUS at 20:37

## 2019-02-14 NOTE — ED TRIAGE NOTES
Patient to ED for slip and fall in kitchen today, hit left side of head on counter. Denies LOC. On coumadin. Patient has right facial bruising from previous fall

## 2019-02-15 NOTE — ED PROVIDER NOTES
EMERGENCY DEPARTMENT ENCOUNTER    CHIEF COMPLAINT  Chief Complaint: right facial pain  History given by: patient; patient's daughter  History limited by: nothing  Room Number: 21/21  PMD: Provider, No Known      HPI:  Pt is a 88 y.o. female who presents complaining of right facial pain that began earlier today s/p losing her balance and falling while in the kitchen of her home. Patient reports that the fall was unwitnessed. Patient reports that she is able to recall all events of the fall and denies LOC. Patient's daughter reports that the patient was not lying on the ground for an extended period of time. Patient denies neck pain, back pain, chest pain, abdominal pain, arm pain, leg pain, headache, visual disturbance, or any other sustaining injuries. Patient reports that she has been able to ambulate since the fall. Patient's daughter reports hx of falls with patient's last two falls on 02/07/19 (patient has healing contusions to the right side of the face) and in 12/2018. Patient's daughter reports that the patient currently takes 2.5 mg of Coumadin three times a week d/t hx of atrial fibrillation. Patient currently lives with her daughter and son-in-law. There are no other complaints at this time.    Duration: happened earlier today  Onset: sudden  Timing: constant  Location: right face  Radiation: none specified  Quality: pain  Intensity/Severity: moderate  Progression: not specified  Associated Symptoms: none specified  Aggravating Factors: none specified  Alleviating Factors: none specified  Previous Episodes: Patient has a hx of falls with her last two falls on 02/07/19 (patient still has healing contusions to the right side of the face) and in 12/2018.  Treatment before arrival: none specified    PAST MEDICAL HISTORY  Active Ambulatory Problems     Diagnosis Date Noted   • Persistent atrial fibrillation (CMS/HCC) 06/26/2017   • Mixed hyperlipidemia 06/26/2017   • Mitral valve insufficiency 06/26/2017   •  Pacemaker 2017   • Sick sinus syndrome (CMS/HCC) 2017   • Hypothyroid 2017     Resolved Ambulatory Problems     Diagnosis Date Noted   • No Resolved Ambulatory Problems     Past Medical History:   Diagnosis Date   • Arrhythmia    • Atherosclerosis of native coronary artery of native heart without angina pectoris    • Atrial fibrillation by electrocardiogram (CMS/[facility])    • Fatigue    • Hyperlipidemia, mixed    • Hypertension, benign    • Mitral valve disorder    • Sick sinus syndrome (CMS/[facility])    • Status post placement of cardiac pacemaker        PAST SURGICAL HISTORY  Past Surgical History:   Procedure Laterality Date   • CARDIAC ELECTROPHYSIOLOGY PROCEDURE N/A 5/15/2018    Procedure: PPM generator change - dual;  Surgeon: Mohan Joy MD;  [facility];  Service: Cardiovascular   • CORONARY ANGIOPLASTY  2009    stent LAD   • INSERT / REPLACE / REMOVE PACEMAKER         FAMILY HISTORY  Family History   Problem Relation Age of Onset   • Coronary artery disease Mother        SOCIAL HISTORY  Social History     Socioeconomic History   • Marital status:      Spouse name: Not on file   • Number of children: Not on file   • Years of education: Not on file   • Highest education level: Not on file   Social Needs   • Financial resource strain: Not on file   • Food insecurity - worry: Not on file   • Food insecurity - inability: Not on file   • Transportation needs - medical: Not on file   • Transportation needs - non-medical: Not on file   Occupational History   • Not on file   Tobacco Use   • Smoking status: Former Smoker     Last attempt to quit:      Years since quittin.1   • Smokeless tobacco: Never Used   Substance and Sexual Activity   • Alcohol use: No   • Drug use: No   • Sexual activity: No   Other Topics Concern   • Not on file   Social History Narrative   • Not on file       ALLERGIES  Patient has no known allergies.    REVIEW OF SYSTEMS  Review of  Systems   Constitutional: Negative for fever.   HENT: Negative for sore throat.         Positive for right facial pain   Eyes: Negative.  Negative for visual disturbance.   Respiratory: Negative for cough and shortness of breath.    Cardiovascular: Negative for chest pain.   Gastrointestinal: Negative for abdominal pain, diarrhea and vomiting.   Genitourinary: Negative for dysuria.   Musculoskeletal: Negative for back pain, myalgias (extremity) and neck pain.   Skin: Negative for rash.   Neurological: Negative for syncope, weakness, numbness and headaches.   Hematological: Negative.    Psychiatric/Behavioral: Negative.    All other systems reviewed and are negative.      PHYSICAL EXAM  ED Triage Vitals   Temp Heart Rate Resp BP SpO2   02/14/19 1859 02/14/19 1858 02/14/19 1858 -- 02/14/19 1858   97.4 °F (36.3 °C) 72 16  95 %      Temp src Heart Rate Source Patient Position BP Location FiO2 (%)   02/14/19 1859 02/14/19 1858 -- -- --   Tympanic Monitor          Physical Exam   Constitutional: She is oriented to person, place, and time. No distress.   HENT:   Head: Normocephalic. Head is without raccoon's eyes and without Uribe's sign.   Right Ear: No hemotympanum.   Left Ear: No hemotympanum.   Nose: No nasal septal hematoma.   Right sided healing facial contusions.   Eyes: EOM are normal.   Fundoscopic exam:       The right eye shows hemorrhage (subconjunctival).   Neck: Normal range of motion. No spinous process tenderness and no muscular tenderness present.   No meningismus   Cardiovascular: Normal rate and regular rhythm.   Pulmonary/Chest: Effort normal and breath sounds normal. No respiratory distress.   Abdominal: Soft. There is no tenderness.   Musculoskeletal: Normal range of motion. She exhibits no edema.   Neurological: She is alert and oriented to person, place, and time. She has normal sensation, normal strength and intact cranial nerves. GCS score is 15.   Skin: Skin is warm and dry.   Nursing note and  vitals reviewed.      LAB RESULTS  Lab Results (last 24 hours)     Procedure Component Value Units Date/Time    CBC & Differential [[id number]] Collected:  02/14/19 2029    Specimen:  Blood Updated:  02/14/19 2039    Narrative:       The following orders were created for panel order CBC & Differential.  Procedure                               Abnormality         Status                     ---------                               -----------         ------                     CBC Auto Differential[[id number]]        Abnormal            Final result                 Please view results for these tests on the individual orders.    Comprehensive Metabolic Panel [[id number]]  (Abnormal) Collected:  02/14/19 2029    Specimen:  Blood Updated:  02/14/19 2101     Glucose 122 mg/dL      BUN 14 mg/dL      Creatinine 0.84 mg/dL      Sodium 141 mmol/L      Potassium 4.4 mmol/L      Chloride 101 mmol/L      CO2 28.5 mmol/L      Calcium 9.6 mg/dL      Total Protein 6.8 g/dL      Albumin 3.40 g/dL      ALT (SGPT) 14 U/L      AST (SGOT) 23 U/L      Alkaline Phosphatase 99 U/L      Total Bilirubin 1.4 mg/dL      eGFR Non African Amer 64 mL/min/1.73      Globulin 3.4 gm/dL      A/G Ratio 1.0 g/dL      BUN/Creatinine Ratio 16.7     Anion Gap 11.5 mmol/L     Narrative:       The MDRD GFR formula is only valid for adults with stable renal function between ages 18 and 70.    Protime-INR [[id number]]  (Abnormal) Collected:  02/14/19 2029    Specimen:  Blood Updated:  02/14/19 2057     Protime 25.1 Seconds      INR 2.33    aPTT [[id number]]  (Abnormal) Collected:  02/14/19 2029    Specimen:  Blood Updated:  02/14/19 2057     PTT 38.4 seconds     CBC Auto Differential [[id number]]  (Abnormal) Collected:  02/14/19 2029    Specimen:  Blood Updated:  02/14/19 2039     WBC 7.11 10*3/mm3      RBC 5.17 10*6/mm3      Hemoglobin 12.9 g/dL      Hematocrit 42.3 %      MCV 81.8 fL      MCH 25.0 pg      MCHC 30.5 g/dL      RDW 21.5 %      RDW-SD 62.8 fl       MPV 9.7 fL      Platelets 270 10*3/mm3      Neutrophil % 71.8 %      Lymphocyte % 15.2 %      Monocyte % 11.5 %      Eosinophil % 0.8 %      Basophil % 0.3 %      Immature Grans % 0.4 %      Neutrophils, Absolute 5.10 10*3/mm3      Lymphocytes, Absolute 1.08 10*3/mm3      Monocytes, Absolute 0.82 10*3/mm3      Eosinophils, Absolute 0.06 10*3/mm3      Basophils, Absolute 0.02 10*3/mm3      Immature Grans, Absolute 0.03 10*3/mm3      nRBC 0.1 /100 WBC           I ordered the above labs and reviewed the results    RADIOLOGY  CT Head Without Contrast   Final Result           No acute intracranial hemorrhage or hydrocephalus. If there is   further clinical concern, MRI could be considered for further   evaluation.       Left maxillary sinus disease with fluid level that may be posttraumatic   in origin or may represent acute sinusitis, correlate clinically.       This report was finalized on 2/14/2019 7:52 PM by Dr. René Eastman M.D.               I ordered the above noted radiological studies. Interpreted by radiologist. Reviewed by me in PACS.       PROCEDURES  Procedures      PROGRESS AND CONSULTS  1905 Initial encounter. Patient is stable. BP-   HR- 72 Temp- 97.4 °F (36.3 °C) (Tympanic) O2 sat- 95% on RA. Discussed the plan to check CT Head and blood work for further evaluation prior to disposition. Pt and patient's daughter understand and agree with the plan, all questions answered.    1928 Ordered blood work and CT Head for further evaluation. Also ordered IV Fluids for hydration.    2142 Rechecked the patient who is resting comfortably and in NAD. Patient is stable. BP- 141/88 HR- 61 Temp- 97.4 °F (36.3 °C) (Tympanic) O2 sat- 98%. Informed the patient and patient's daughter of the CT Head that shows no intracranial hemorrhage or hydrocephalus. Also informed them that the patient's INR was 2.33. Also informed them that the patient does not have any abnormalities with her electrolytes or kidney function.  Discussed the plan for discharge with instructions to f/u with her PCP for further evaluation and management. Strict RTER warnings given. Pt and patient's daughter understand and agreed with the plan, all questions answered.      MEDICAL DECISION MAKING  Results were reviewed/discussed with the patient and they were also made aware of online access. Pt also made aware that some labs, such as cultures, will not be resulted during ER visit and follow up with PMD is necessary.     MDM  Number of Diagnoses or Management Options     Amount and/or Complexity of Data Reviewed  Clinical lab tests: ordered and reviewed (INR: 2.33)  Tests in the radiology section of CPT®: ordered and reviewed (Ct Head Without Contrast shows no acute intracranial hemorrhage or hydrocephalus. If there is further clinical concern, MRI could be considered for further evaluation.  Left maxillary sinus disease with fluid level that may be posttraumatic in origin or may represent acute sinusitis, correlate clinically.  This report was finalized on 2/14/2019 7:52 PM by Dr. René Eastman M.D.)  Decide to obtain previous medical records or to obtain history from someone other than the patient: yes (Epic)  Obtain history from someone other than the patient: yes (Patient's daughter)  Review and summarize past medical records: yes (The patient was seen at [facility] ED one week ago for bleeding on Coumadin.)  Independent visualization of images, tracings, or specimens: yes    Patient Progress  Patient progress: stable         DIAGNOSIS  Final diagnoses:   Fall, initial encounter   Contusion of head, unspecified part of head, initial encounter       DISPOSITION  DISCHARGE    Patient discharged in stable condition.    Reviewed implications of results, diagnosis, meds, responsibility to follow up, warning signs and symptoms of possible worsening, potential complications and reasons to return to ER, including any new or worsening symptoms.    Patient/Family  voiced understanding of above instructions.    Discussed plan for discharge, as there is no emergent indication for admission. Patient referred to primary care provider for BP management due to today's BP. Pt/family is agreeable and understands need for follow up and repeat testing.  Pt is aware that discharge does not mean that nothing is wrong but it indicates no emergency is present that requires admission and they must continue care with follow-up as given below or physician of their choice.     FOLLOW-UP  Peter Azevedo MD  [address] [facility]  [address] [place], [place] [id number] [phone]    Schedule an appointment as soon as possible for a visit            Medication List      No changes were made to your prescriptions during this visit.           Latest Documented Vital Signs:  As of 11:23 PM  BP- (!) 165/108 HR- 66 Temp- 97.4 °F (36.3 °C) (Tympanic) O2 sat- 99%    --  Documentation assistance provided by john Bangura for Dr. Amarjit MD.  Information recorded by the scribe was done at my direction and has been verified and validated by me.       Bridget Bangura  02/14/19 6377       Isai Ocasio MD  02/14/19 2784

## 2019-02-15 NOTE — ED NOTES
"Pt states she \"tripped\" and fell this evening. Denies LOC before or after fall. Denies any pain or injury, including headache, but had a raised area on the left side of her head. No active bleeding or open skin noted at this time. Pt has redness and bruising around her R eye that her daughter states was from a \"coumadin overdose a week ago.\" States pt is still on coumadin earlier. Pt is alert and oriented x4.    VSS, NAD. Respirations even and unlabored. Denies any other acute complaints. Call light in reach. Will continue to monitor. MD to treat.     Rita Villavicencio, RN  02/14/19 1930    "

## 2019-02-15 NOTE — ED NOTES
Attempted to 2 IV starts without success. Pt tolerated well. 2nd RN to attempt IV access.     Rita Villavicencio, RN  02/14/19 2010

## 2019-02-20 ENCOUNTER — ANTICOAGULATION VISIT (OUTPATIENT)
Dept: PHARMACY | Facility: HOSPITAL | Age: 84
End: 2019-02-20

## 2019-02-20 DIAGNOSIS — I48.19 PERSISTENT ATRIAL FIBRILLATION (HCC): ICD-10-CM

## 2019-02-20 LAB — INR PPP: 2.6

## 2019-02-20 NOTE — PROGRESS NOTES
Anticoagulation Clinic Progress Note    Anticoagulation Summary  As of 2019    INR goal:   2.0-3.0   TTR:   60.5 % (2.4 wk)   INR used for dosin.60 (2019)   Warfarin maintenance plan:   1.25 mg on Tue, Thu, Sat; 2.5 mg all other days   Weekly warfarin total:   13.75 mg   Plan last modified:   Arya Choudhary RP (2019)   Next INR check:   3/6/2019   Priority:   Maintenance   Target end date:   Indefinite    Indications    Persistent atrial fibrillation (CMS/HCC) [I48.1]             Anticoagulation Episode Summary     INR check location:       Preferred lab:       Send INR reminders to:   [facility]  POOL    Comments:   **[facility]**      Anticoagulation Care Providers     Provider Role Specialty Phone number    Ike Rodriguez MD Referring Cardiology [phone]          Clinic Interview:      INR History:  Anticoagulation Monitoring 2019   INR 4.40 2.10 2.60   INR Date 2019   INR Goal 2.0-3.0 2.0-3.0 2.0-3.0   Trend Same Down Same   Last Week Total 17.5 mg 5 mg 13.75 mg   Next Week Total 15 mg 13.75 mg 13.75 mg   Sun 2.5 mg - 2.5 mg   Mon 2.5 mg 2.5 mg 2.5 mg   Tue 2.5 mg 1.25 mg 1.25 mg   Wed Hold () 2.5 mg 2.5 mg   Thu 2.5 mg 1.25 mg 1.25 mg   Fri 2.5 mg - 2.5 mg   Sat 2.5 mg - 1.25 mg   Visit Report - - -   Some recent data might be hidden       Plan:  1. INR is Therapeutic today- see above in Anticoagulation Summary.   Will instruct Tala Bethea to Continue their warfarin regimen- see above in Anticoagulation Summary.  2. Follow up in 2 weeks  3.  Pt has agreed to only be called if INR out of range. They have been instructed to call if any changes in medications, doses, concerns, etc. Patient expresses understanding and has no further questions at this time.    Mindy Gongora [facility]

## 2019-02-27 ENCOUNTER — ANTICOAGULATION VISIT (OUTPATIENT)
Dept: PHARMACY | Facility: HOSPITAL | Age: 84
End: 2019-02-27

## 2019-02-27 DIAGNOSIS — I48.19 PERSISTENT ATRIAL FIBRILLATION (HCC): ICD-10-CM

## 2019-02-27 LAB — INR PPP: 2.6

## 2019-02-27 NOTE — PROGRESS NOTES
Anticoagulation Clinic Progress Note    Anticoagulation Summary  As of 2019    INR goal:   2.0-3.0   TTR:   72.0 % (3.4 wk)   INR used for dosin.60 (2019)   Warfarin maintenance plan:   1.25 mg on Tue, Thu, Sat; 2.5 mg all other days   Weekly warfarin total:   13.75 mg   Plan last modified:   Arya Choudhary RPH (2019)   Next INR check:   3/6/2019   Priority:   Maintenance   Target end date:   Indefinite    Indications    Persistent atrial fibrillation (CMS/HCC) [I48.1]             Anticoagulation Episode Summary     INR check location:       Preferred lab:       Send INR reminders to:   [facility]  POOL    Comments:   **[facility]**      Anticoagulation Care Providers     Provider Role Specialty Phone number    Ike Rodriguez MD Referring Cardiology [phone]          Clinic Interview:  No pertinent clinical findings have been reported.    INR History:  Anticoagulation Monitoring 2019   INR 2.10 2.60 2.60   INR Date 2019   INR Goal 2.0-3.0 2.0-3.0 2.0-3.0   Trend Down Same Same   Last Week Total 5 mg 13.75 mg 13.75 mg   Next Week Total 13.75 mg 13.75 mg 13.75 mg   Sun - 2.5 mg 2.5 mg   Mon 2.5 mg 2.5 mg 2.5 mg   Tue 1.25 mg 1.25 mg 1.25 mg   Wed 2.5 mg 2.5 mg 2.5 mg   Thu 1.25 mg 1.25 mg 1.25 mg   Fri - 2.5 mg 2.5 mg   Sat - 1.25 mg 1.25 mg   Visit Report - - -   Some recent data might be hidden       Plan:  1. INR is therapeutic today- see above in Anticoagulation Summary.    Tala DANIEL Bethea to continue their warfarin regimen- see above in Anticoagulation Summary.  2. Follow up in 1 week.  3. Pt has agreed to only be called if INR out of range. They have been instructed to call if any changes in medications, doses, concerns, etc. Patient expresses understanding and has no further questions at this time.    Antonia Salcedo

## 2019-03-06 ENCOUNTER — ANTICOAGULATION VISIT (OUTPATIENT)
Dept: PHARMACY | Facility: HOSPITAL | Age: 84
End: 2019-03-06

## 2019-03-06 DIAGNOSIS — I48.19 PERSISTENT ATRIAL FIBRILLATION (HCC): ICD-10-CM

## 2019-03-06 LAB — INR PPP: 2.8

## 2019-03-06 NOTE — PROGRESS NOTES
Anticoagulation Clinic Progress Note    Anticoagulation Summary  As of 3/6/2019    INR goal:   2.0-3.0   TTR:   78.3 % (1 mo)   INR used for dosin.80 (3/6/2019)   Warfarin maintenance plan:   1.25 mg on Tue, Thu, Sat; 2.5 mg all other days   Weekly warfarin total:   13.75 mg   No change documented:   Hugo Hines RPH   Plan last modified:   Arya Choudhary RPH (2019)   Next INR check:   3/20/2019   Priority:   Maintenance   Target end date:   Indefinite    Indications    Persistent atrial fibrillation (CMS/HCC) [I48.1]             Anticoagulation Episode Summary     INR check location:       Preferred lab:       Send INR reminders to:   [facility]  POOL    Comments:   **[facility]**      Anticoagulation Care Providers     Provider Role Specialty Phone number    Ike Rodriguez MD Referring Cardiology [phone]              INR History:  Anticoagulation Monitoring 2019 2019 3/6/2019   INR 2.60 2.60 2.80   INR Date 2019 2019 3/6/2019   INR Goal 2.0-3.0 2.0-3.0 2.0-3.0   Trend Same Same Same   Last Week Total 13.75 mg 13.75 mg 13.75 mg   Next Week Total 13.75 mg 13.75 mg 13.75 mg   Sun 2.5 mg 2.5 mg 2.5 mg   Mon 2.5 mg 2.5 mg 2.5 mg   Tue 1.25 mg 1.25 mg 1.25 mg   Wed 2.5 mg 2.5 mg 2.5 mg   Thu 1.25 mg 1.25 mg 1.25 mg   Fri 2.5 mg 2.5 mg 2.5 mg   Sat 1.25 mg 1.25 mg 1.25 mg   Visit Report - - -   Some recent data might be hidden       Plan:  1. INR is Therapeutic today- see above in Anticoagulation Summary.   Will instruct Tala Bethea to Continue their warfarin regimen- see above in Anticoagulation Summary.  2. Follow up in 2 weeks  3. Pt has agreed to only be called if INR out of range. They have been instructed to call if any changes in medications, doses, concerns, etc. Patient expresses understanding and has no further questions at this time.    Hugo Hines RPH

## 2019-03-13 ENCOUNTER — ANTICOAGULATION VISIT (OUTPATIENT)
Dept: PHARMACY | Facility: HOSPITAL | Age: 84
End: 2019-03-13

## 2019-03-13 DIAGNOSIS — I48.19 PERSISTENT ATRIAL FIBRILLATION (HCC): ICD-10-CM

## 2019-03-13 LAB — INR PPP: 3.8

## 2019-03-13 NOTE — PROGRESS NOTES
Anticoagulation Clinic Progress Note    Anticoagulation Summary  As of 3/13/2019    INR goal:   2.0-3.0   TTR:   67.6 % (1.3 mo)   INR used for dosing:   3.80! (3/13/2019)   Warfarin maintenance plan:   2.5 mg on Mon, Wed, Fri; 1.25 mg all other days   Weekly warfarin total:   12.5 mg   Plan last modified:   Josephine Gaitan RPH (3/13/2019)   Next INR check:      Priority:   Maintenance   Target end date:   Indefinite    Indications    Persistent atrial fibrillation (CMS/HCC) [I48.1]             Anticoagulation Episode Summary     INR check location:       Preferred lab:       Send INR reminders to:    [facility]  POOL    Comments:   **[facility]**      Anticoagulation Care Providers     Provider Role Specialty Phone number    Ike Rodriguez MD Referring Cardiology [phone]          Clinic Interview:      INR History:  Anticoagulation Monitoring 2/27/2019 3/6/2019 3/13/2019   INR 2.60 2.80 3.80   INR Date 2/27/2019 3/6/2019 3/13/2019   INR Goal 2.0-3.0 2.0-3.0 2.0-3.0   Trend Same Same Down   Last Week Total 13.75 mg 13.75 mg 13.75 mg   Next Week Total 13.75 mg 13.75 mg 10 mg   Sun 2.5 mg 2.5 mg 1.25 mg   Mon 2.5 mg 2.5 mg 2.5 mg   Tue 1.25 mg 1.25 mg 1.25 mg   Wed 2.5 mg 2.5 mg Hold (3/13)   Thu 1.25 mg 1.25 mg 1.25 mg   Fri 2.5 mg 2.5 mg 2.5 mg   Sat 1.25 mg 1.25 mg 1.25 mg   Visit Report - - -   Some recent data might be hidden       Plan:  1. INR is out of range- see above in Anticoagulation Summary.   Will instruct Tala Bethea to Change  their warfarin regimen- see above in Anticoagulation Summary.  Hold warfarin dose today, then resume at lower dosing.  2. Follow up in 1 weeks.   3. Patient declines warfarin refills.  4. Spoke with daughter by phone and she was instructed to monitor for any bleeding issues and will seek emergency care if needed.  Josephine Gaitan MARÍA

## 2019-03-14 ENCOUNTER — HOSPITAL ENCOUNTER (OUTPATIENT)
Facility: HOSPITAL | Age: 84
Setting detail: OBSERVATION
Discharge: HOME | End: 2019-03-15
Attending: EMERGENCY MEDICINE | Admitting: HOSPITALIST

## 2019-03-14 ENCOUNTER — APPOINTMENT (OUTPATIENT)
Dept: GENERAL RADIOLOGY | Facility: HOSPITAL | Age: 84
End: 2019-03-14

## 2019-03-14 ENCOUNTER — APPOINTMENT (OUTPATIENT)
Dept: CT IMAGING | Facility: HOSPITAL | Age: 84
End: 2019-03-14

## 2019-03-14 DIAGNOSIS — S09.90XA INJURY OF HEAD, INITIAL ENCOUNTER: ICD-10-CM

## 2019-03-14 DIAGNOSIS — R79.1 SUPRATHERAPEUTIC INR: Primary | ICD-10-CM

## 2019-03-14 DIAGNOSIS — R26.89 DECREASED MOBILITY: ICD-10-CM

## 2019-03-14 DIAGNOSIS — S20.229A CONTUSION OF BACK, UNSPECIFIED LATERALITY, INITIAL ENCOUNTER: ICD-10-CM

## 2019-03-14 LAB
ALBUMIN SERPL-MCNC: 3.7 G/DL (ref 3.5–5.2)
ALBUMIN/GLOB SERPL: 1.3 G/DL
ALP SERPL-CCNC: 84 U/L (ref 39–117)
ALT SERPL W P-5'-P-CCNC: 12 U/L (ref 1–33)
ANION GAP SERPL CALCULATED.3IONS-SCNC: 12.5 MMOL/L
APTT PPP: 45.9 SECONDS (ref 22.7–35.4)
AST SERPL-CCNC: 24 U/L (ref 1–32)
BASOPHILS # BLD AUTO: 0.03 10*3/MM3 (ref 0–0.2)
BASOPHILS NFR BLD AUTO: 0.5 % (ref 0–1.5)
BILIRUB SERPL-MCNC: 1.2 MG/DL (ref 0.1–1.2)
BUN BLD-MCNC: 16 MG/DL (ref 8–23)
BUN/CREAT SERPL: 20 (ref 7–25)
CALCIUM SPEC-SCNC: 9.6 MG/DL (ref 8.6–10.5)
CHLORIDE SERPL-SCNC: 104 MMOL/L (ref 98–107)
CO2 SERPL-SCNC: 27.5 MMOL/L (ref 22–29)
CREAT BLD-MCNC: 0.8 MG/DL (ref 0.57–1)
DEPRECATED RDW RBC AUTO: 66.4 FL (ref 37–54)
EOSINOPHIL # BLD AUTO: 0.05 10*3/MM3 (ref 0–0.4)
EOSINOPHIL NFR BLD AUTO: 0.9 % (ref 0.3–6.2)
ERYTHROCYTE [DISTWIDTH] IN BLOOD BY AUTOMATED COUNT: 22.2 % (ref 12.3–15.4)
GFR SERPL CREATININE-BSD FRML MDRD: 68 ML/MIN/1.73
GLOBULIN UR ELPH-MCNC: 2.9 GM/DL
GLUCOSE BLD-MCNC: 101 MG/DL (ref 65–99)
HCT VFR BLD AUTO: 39.1 % (ref 34–46.6)
HGB BLD-MCNC: 11.5 G/DL (ref 12–15.9)
IMM GRANULOCYTES # BLD AUTO: 0.02 10*3/MM3 (ref 0–0.05)
IMM GRANULOCYTES NFR BLD AUTO: 0.4 % (ref 0–0.5)
INR PPP: 4.81 (ref 0.9–1.1)
LYMPHOCYTES # BLD AUTO: 1.2 10*3/MM3 (ref 0.7–3.1)
LYMPHOCYTES NFR BLD AUTO: 21.1 % (ref 19.6–45.3)
MCH RBC QN AUTO: 24.7 PG (ref 26.6–33)
MCHC RBC AUTO-ENTMCNC: 29.4 G/DL (ref 31.5–35.7)
MCV RBC AUTO: 83.9 FL (ref 79–97)
MONOCYTES # BLD AUTO: 0.5 10*3/MM3 (ref 0.1–0.9)
MONOCYTES NFR BLD AUTO: 8.8 % (ref 5–12)
NEUTROPHILS # BLD AUTO: 3.88 10*3/MM3 (ref 1.4–7)
NEUTROPHILS NFR BLD AUTO: 68.3 % (ref 42.7–76)
NRBC BLD AUTO-RTO: 0 /100 WBC (ref 0–0)
PLATELET # BLD AUTO: 229 10*3/MM3 (ref 140–450)
PMV BLD AUTO: 9.4 FL (ref 6–12)
POTASSIUM BLD-SCNC: 4.2 MMOL/L (ref 3.5–5.2)
PROT SERPL-MCNC: 6.6 G/DL (ref 6–8.5)
PROTHROMBIN TIME: 44.3 SECONDS (ref 11.7–14.2)
RBC # BLD AUTO: 4.66 10*6/MM3 (ref 3.77–5.28)
SODIUM BLD-SCNC: 144 MMOL/L (ref 136–145)
WBC NRBC COR # BLD: 5.68 10*3/MM3 (ref 3.4–10.8)

## 2019-03-14 PROCEDURE — 90715 TDAP VACCINE 7 YRS/> IM: CPT | Performed by: NURSE PRACTITIONER

## 2019-03-14 PROCEDURE — 72125 CT NECK SPINE W/O DYE: CPT

## 2019-03-14 PROCEDURE — G0378 HOSPITAL OBSERVATION PER HR: HCPCS

## 2019-03-14 PROCEDURE — 99284 EMERGENCY DEPT VISIT MOD MDM: CPT

## 2019-03-14 PROCEDURE — 70450 CT HEAD/BRAIN W/O DYE: CPT

## 2019-03-14 PROCEDURE — 72072 X-RAY EXAM THORAC SPINE 3VWS: CPT

## 2019-03-14 PROCEDURE — 85025 COMPLETE CBC W/AUTO DIFF WBC: CPT | Performed by: NURSE PRACTITIONER

## 2019-03-14 PROCEDURE — 85610 PROTHROMBIN TIME: CPT | Performed by: NURSE PRACTITIONER

## 2019-03-14 PROCEDURE — 90471 IMMUNIZATION ADMIN: CPT | Performed by: NURSE PRACTITIONER

## 2019-03-14 PROCEDURE — 80053 COMPREHEN METABOLIC PANEL: CPT | Performed by: NURSE PRACTITIONER

## 2019-03-14 PROCEDURE — 85730 THROMBOPLASTIN TIME PARTIAL: CPT | Performed by: NURSE PRACTITIONER

## 2019-03-14 PROCEDURE — 25010000002 TDAP 5-2.5-18.5 LF-MCG/0.5 SUSPENSION: Performed by: NURSE PRACTITIONER

## 2019-03-14 RX ORDER — DIGOXIN 125 MCG
125 TABLET ORAL
Status: DISCONTINUED | OUTPATIENT
Start: 2019-03-15 | End: 2019-03-15 | Stop reason: HOSPADM

## 2019-03-14 RX ORDER — ONDANSETRON 2 MG/ML
4 INJECTION INTRAMUSCULAR; INTRAVENOUS EVERY 6 HOURS PRN
Status: DISCONTINUED | OUTPATIENT
Start: 2019-03-14 | End: 2019-03-15 | Stop reason: HOSPADM

## 2019-03-14 RX ORDER — METOPROLOL SUCCINATE 100 MG/1
100 TABLET, EXTENDED RELEASE ORAL DAILY
Status: DISCONTINUED | OUTPATIENT
Start: 2019-03-14 | End: 2019-03-15 | Stop reason: HOSPADM

## 2019-03-14 RX ORDER — ESCITALOPRAM OXALATE 5 MG/1
5 TABLET ORAL DAILY
Status: DISCONTINUED | OUTPATIENT
Start: 2019-03-14 | End: 2019-03-15 | Stop reason: HOSPADM

## 2019-03-14 RX ORDER — POTASSIUM CHLORIDE 1.5 G/1.77G
20 POWDER, FOR SOLUTION ORAL 2 TIMES DAILY
Status: DISCONTINUED | OUTPATIENT
Start: 2019-03-14 | End: 2019-03-15 | Stop reason: HOSPADM

## 2019-03-14 RX ORDER — FUROSEMIDE 40 MG/1
40 TABLET ORAL DAILY
Status: DISCONTINUED | OUTPATIENT
Start: 2019-03-14 | End: 2019-03-14

## 2019-03-14 RX ORDER — PANTOPRAZOLE SODIUM 40 MG/1
40 TABLET, DELAYED RELEASE ORAL EVERY MORNING
Status: DISCONTINUED | OUTPATIENT
Start: 2019-03-15 | End: 2019-03-15 | Stop reason: HOSPADM

## 2019-03-14 RX ORDER — ATORVASTATIN CALCIUM 10 MG/1
10 TABLET, FILM COATED ORAL DAILY
Status: DISCONTINUED | OUTPATIENT
Start: 2019-03-14 | End: 2019-03-15 | Stop reason: HOSPADM

## 2019-03-14 RX ORDER — ACETAMINOPHEN 325 MG/1
650 TABLET ORAL EVERY 4 HOURS PRN
Status: DISCONTINUED | OUTPATIENT
Start: 2019-03-14 | End: 2019-03-15 | Stop reason: HOSPADM

## 2019-03-14 RX ORDER — LEVOTHYROXINE SODIUM 0.05 MG/1
50 TABLET ORAL
Status: DISCONTINUED | OUTPATIENT
Start: 2019-03-15 | End: 2019-03-14

## 2019-03-14 RX ORDER — LEVOTHYROXINE SODIUM 0.07 MG/1
75 TABLET ORAL
Status: DISCONTINUED | OUTPATIENT
Start: 2019-03-15 | End: 2019-03-15 | Stop reason: HOSPADM

## 2019-03-14 RX ORDER — ACETAMINOPHEN AND CODEINE PHOSPHATE 300; 30 MG/1; MG/1
1 TABLET ORAL EVERY 6 HOURS PRN
Status: DISCONTINUED | OUTPATIENT
Start: 2019-03-14 | End: 2019-03-15 | Stop reason: HOSPADM

## 2019-03-14 RX ADMIN — ESCITALOPRAM OXALATE 5 MG: 5 TABLET, FILM COATED ORAL at 22:16

## 2019-03-14 RX ADMIN — FUROSEMIDE 60 MG: 20 TABLET ORAL at 22:20

## 2019-03-14 RX ADMIN — TETANUS TOXOID, REDUCED DIPHTHERIA TOXOID AND ACELLULAR PERTUSSIS VACCINE, ADSORBED 0.5 ML: 5; 2.5; 8; 8; 2.5 SUSPENSION INTRAMUSCULAR at 14:59

## 2019-03-14 RX ADMIN — METOPROLOL SUCCINATE 100 MG: 100 TABLET, FILM COATED, EXTENDED RELEASE ORAL at 22:15

## 2019-03-14 RX ADMIN — POTASSIUM CHLORIDE 20 MEQ: 1.5 POWDER, FOR SOLUTION ORAL at 22:14

## 2019-03-14 RX ADMIN — ATORVASTATIN CALCIUM 10 MG: 10 TABLET, FILM COATED ORAL at 22:16

## 2019-03-14 NOTE — ED NOTES
Patients left appears swollen, patient denies any pain. Patient unable to confirm or deny if she hit her arm when she fell today. Informed APRN.      Debbie Box RN  03/14/19 7539

## 2019-03-14 NOTE — ED PROVIDER NOTES
MD ATTESTATION NOTE  The HEVER and I have discussed this patient's history, physical exam, and treatment plan.  I have reviewed the documentation and personally had a face to face interaction with the patient. I affirm the documentation and agree with the treatment and plan.  The attached note describes my personal findings.    Hx- Hx comes primarily from the Daughter. Pt's daughter says when she came home she found the pt on the ground after she had fallen. t states that she stood up from her recliner, lost her balance, and fell. Pt's daughter states that the pt is currently at her mental baseline. Pt was on the floor for no longer than one hours. She also c/o occipital HA. Pt has been able to ambulate since the fall. Pt is currently on Coumadin for Hx of A-fib.    PEx-  Constitution- NAD, Elderly  Heart- RRR, no murmur  HENT- there is a contusion and abrasion with dried blood to occipital scalp, without neck pain  Lungs- CTAB, no respiratory distress  Abd- soft, non-tender, no rebound, no guarding  Neuro- A/Ox3    Plan- Pt's CT scan is negative. The plan is to discuss the pt's case with Neurosurgery.   I am concerned about the elevation of the INR as well as the trauma to this elderly female.  I believe we probably need to admit and observe and repeat CT scan this a.m.    Documentation assistance provided by john Clark for Dr. Clark.  Information recorded by the scribe was done at my direction and has been verified and validated by me.       Matti Clark  03/14/19 2711       Daniele Clark MD  03/14/19 1016

## 2019-03-14 NOTE — ED PROVIDER NOTES
EMERGENCY DEPARTMENT ENCOUNTER    CHIEF COMPLAINT  Chief Complaint: head injury  History given by: patient, family (daughter)  History limited by: chronic confusion  Time Seen: 1348  Room Number: 35/35  PMD: Peter Azevedo MD      HPI:  History predominantly obtained from daughter. Pt is a 88 y.o. female who lives at home with daughter and has chronic confusion. Daughter states that when she returned home from work for lunch today, she found pt lying on the floor in front of her recliner. Pt reports that she stood up from her recliner, lost her balance, and fell backwards. She notes that she sustained blow to her head but did not lose consciousness. Daughter states that pt was on the floor for no longer than 1 to 1.5 hours and that pt has occipital scalp wound and bruising to her mid back. Currently, pt c/o occipital headache and denies neck pain, back pain, abd pain, chest pain, dyspnea, nausea, vomiting, vision changes, dizziness, and sustaining any other injury. According to daughter, pt has ambulated after the fall and is currently at her baseline mental status. Additionally, daughter reports that pt is on 81mg ASA and coumadin due to hx of pacemaker and had INR of 3.8 yesterday. Tetanus status is unknown. Past Medical History of falls, atrial fibrillation, SSS with pacemaker, hyperlipidemia, and HTN.     Duration: fall occurred today  Timing: constant  Location: head  Radiation: none  Quality: ache  Intensity/Severity: moderate   Progression: unchanged   Associated Symptoms: occipital scalp wound, mid back bruising, occipital headache  Aggravating Factors: none mentioned  Alleviating Factors: none mentioned  Previous Episodes: Per daughter, pt has hx of falls.   Treatment before arrival: none mentioned    PAST MEDICAL HISTORY  Active Ambulatory Problems     Diagnosis Date Noted   • Persistent atrial fibrillation (CMS/HCC) 06/26/2017   • Mixed hyperlipidemia 06/26/2017   • Mitral valve insufficiency  2017   • Pacemaker 2017   • Sick sinus syndrome (CMS/HCC) 2017   • Hypothyroid 2017     Resolved Ambulatory Problems     Diagnosis Date Noted   • No Resolved Ambulatory Problems     Past Medical History:   Diagnosis Date   • Arrhythmia    • Atherosclerosis of native coronary artery of native heart without angina pectoris    • Atrial fibrillation by electrocardiogram (CMS/HCC)    • Fatigue    • Hyperlipidemia, mixed    • Hypertension, benign    • Mitral valve disorder    • Sick sinus syndrome (CMS/HCC)    • Status post placement of cardiac pacemaker        PAST SURGICAL HISTORY  Past Surgical History:   Procedure Laterality Date   • CARDIAC ELECTROPHYSIOLOGY PROCEDURE N/A 5/15/2018    Procedure: PPM generator change - dual;  Surgeon: Mohan Joy MD;  Location: [facility] CATH INVASIVE LOCATION;  Service: Cardiovascular   • CORONARY ANGIOPLASTY  2009    stent LAD   • INSERT / REPLACE / REMOVE PACEMAKER         FAMILY HISTORY  Family History   Problem Relation Age of Onset   • Coronary artery disease Mother        SOCIAL HISTORY  Social History     Socioeconomic History   • Marital status:      Spouse name: Not on file   • Number of children: Not on file   • Years of education: Not on file   • Highest education level: Not on file   Social Needs   • Financial resource strain: Not on file   • Food insecurity - worry: Not on file   • Food insecurity - inability: Not on file   • Transportation needs - medical: Not on file   • Transportation needs - non-medical: Not on file   Occupational History   • Not on file   Tobacco Use   • Smoking status: Former Smoker     Last attempt to quit:      Years since quittin.2   • Smokeless tobacco: Never Used   Substance and Sexual Activity   • Alcohol use: No   • Drug use: No   • Sexual activity: No   Other Topics Concern   • Not on file   Social History Narrative   • Not on file         ALLERGIES  Patient has no known allergies.    REVIEW OF  SYSTEMS  Review of Systems   Unable to perform ROS: Other (chronic confusion)   Eyes: Negative for visual disturbance.   Respiratory: Negative for shortness of breath.    Cardiovascular: Negative for chest pain.   Gastrointestinal: Negative for abdominal pain, nausea and vomiting.   Musculoskeletal: Negative for back pain and neck pain.   Skin: Positive for color change (mid back bruising) and wound (occipital head wound).   Neurological: Positive for headaches. Negative for dizziness.       PHYSICAL EXAM  ED Triage Vitals   Temp Heart Rate Resp BP SpO2   03/14/19 1303 03/14/19 1303 03/14/19 1303 03/14/19 1332 03/14/19 1303   96.5 °F (35.8 °C) 87 16 140/97 96 % WNL     Physical Exam   Constitutional: She is oriented to person, place, and time. No distress.   HENT:   Head: Normocephalic.   Quarter sized hematoma to the mid occiput with small abrasion (no laceration)   Eyes: EOM are normal. Pupils are equal, round, and reactive to light.   Neck: Normal range of motion. Neck supple.   No c-spine tenderness   Cardiovascular: Normal rate, regular rhythm and normal heart sounds.   Pulmonary/Chest: Effort normal and breath sounds normal. No respiratory distress. She has no wheezes. She has no rhonchi. She has no rales. She exhibits no tenderness.   Abdominal: Soft. There is no tenderness. There is no rebound and no guarding.   Musculoskeletal:   No t-spine or l-spine tenderness, FROM of all extremities without tenderness, NV intact distally to all extremities    Neurological: She is alert and oriented to person, place, and time.   Answers simple questions appropriately, follows commands, nonfocal neuro exam   Skin: Skin is warm and dry.   Bruising to thoracic back   Nursing note and vitals reviewed.      LAB RESULTS  Recent Results (from the past 24 hour(s))   Protime-INR    Collection Time: 03/14/19  2:05 PM   Result Value Ref Range    Protime 44.3 (H) 11.7 - 14.2 Seconds    INR 4.81 (C) 0.90 - 1.10   aPTT    Collection  Time: 03/14/19  2:05 PM   Result Value Ref Range    PTT 45.9 (H) 22.7 - 35.4 seconds   CBC Auto Differential    Collection Time: 03/14/19  3:59 PM   Result Value Ref Range    WBC 5.68 3.40 - 10.80 10*3/mm3    RBC 4.66 3.77 - 5.28 10*6/mm3    Hemoglobin 11.5 (L) 12.0 - 15.9 g/dL    Hematocrit 39.1 34.0 - 46.6 %    MCV 83.9 79.0 - 97.0 fL    MCH 24.7 (L) 26.6 - 33.0 pg    MCHC 29.4 (L) 31.5 - 35.7 g/dL    RDW 22.2 (H) 12.3 - 15.4 %    RDW-SD 66.4 (H) 37.0 - 54.0 fl    MPV 9.4 6.0 - 12.0 fL    Platelets 229 140 - 450 10*3/mm3    Neutrophil % 68.3 42.7 - 76.0 %    Lymphocyte % 21.1 19.6 - 45.3 %    Monocyte % 8.8 5.0 - 12.0 %    Eosinophil % 0.9 0.3 - 6.2 %    Basophil % 0.5 0.0 - 1.5 %    Immature Grans % 0.4 0.0 - 0.5 %    Neutrophils, Absolute 3.88 1.40 - 7.00 10*3/mm3    Lymphocytes, Absolute 1.20 0.70 - 3.10 10*3/mm3    Monocytes, Absolute 0.50 0.10 - 0.90 10*3/mm3    Eosinophils, Absolute 0.05 0.00 - 0.40 10*3/mm3    Basophils, Absolute 0.03 0.00 - 0.20 10*3/mm3    Immature Grans, Absolute 0.02 0.00 - 0.05 10*3/mm3    nRBC 0.0 0.0 - 0.0 /100 WBC       I ordered the above labs and reviewed the results    RADIOLOGY  XR Spine Thoracic 3 View (Final result)   Result time 03/14/19 14:47:56   Final result by René Eastman MD (03/14/19 14:47:56)                Impression:          1. Opacity posteriorly at the lung base, as described, further  evaluation is advised.  2. Evidence of age-indeterminate T12 compression fracture, correlate  with location of pain. No other evidence of thoracic compression  fracture is identified. For further evaluation, if indicated, CT can be  considered.     This report was finalized on 3/14/2019 2:47 PM by Dr. René Eastman M.D.               Narrative:    XR SPINE THORACIC 3 VW-     INDICATIONS:    Trauma     TECHNIQUE: 3 views of the thoracic spine.        COMPARISON: None available     FINDINGS:      A compression deformity at T12, age indeterminate, with about 10% loss  of  height apparent, correlate with location of pain. Vertebral body  heights otherwise appear preserved. Multilevel endplate spurring. Disc  space narrowing is apparent at the lower cervical levels. Cardiac  pacemaker and leads are partly included. The heart size, although not  well characterized with this technique, appears enlarged. Opacity  projecting posteriorly at the base on the lateral view, presumably  related to large hiatal hernia, but indeterminate on the basis of this  exam, compared with prior exams if they can be obtained, otherwise  further evaluation of the chest is suggested such as initial further  evaluation with PA and lateral chest x-ray.                       CT Head Without Contrast (Preliminary result)   Result time 03/14/19 14:59:01   Preliminary result by Willie Kimble MD (03/14/19 14:59:01)                Impression:    No acute intracranial process identified.        CT OF THE CERVICAL SPINE WITHOUT CONTRAST     Axial images were obtained through the cervical spine without contrast.  Sagittal and coronal reconstruction images were reviewed.     FINDINGS:  There is degenerative change of the C1-C2 articulation. There  is minimal anterolisthesis of C4 on C5. There is moderately severe C5-6  spondylosis.     No other subluxation is seen. No cervical spine fractures are seen.     IMPRESSION: Cervical degenerative disease as described. No fractures are  seen.     Radiation dose reduction techniques were utilized, including automated  exposure control and exposure modulation based on body size.                  Narrative:    HISTORY: Fell, head injury. Neck pain. 03/14/2019     CT OF THE BRAIN WITHOUT CONTRAST     Axial images were obtained through the brain without intravenous  contrast.     FINDINGS:  There is mild to moderate diffuse atrophy. There is decreased  attenuation of the periventricular white matter bilaterally consistent  with small vessel white matter ischemic disease. No  intracranial  hemorrhage, midline shift, mass effect or acute infarction is seen.  Small scalp hematoma is seen posteriorly.     No skull fractures are seen.                   I ordered the above noted radiological studies and reviewed the images on the PACS system.        MEDICAL RECORD REVIEW    4 week ago, hgb was 12.9.       PROGRESS AND CONSULTS    1400- Tetanus status is unknown-> ordered tdap. Ordered CT head, CT c-spine, t-spine xray, PT with INR, and PTT for further evaluation. Nursing staff to perform wound care.     1450- T-spine xray shows age indeterminate T12 compression fracture. I do not believe this is acute as pt denies back pain and has no t-spine tenderness nor l-spine tenderness on exam.     1500- Obtained CT head results-> no acute process; CT c-spine results-> no acute fracture.     1509- Reviewed pt's history and workup with Dr. Clark.  At bedside evaluation, they agree with the plan of care.    1513- INR is supratherapeutic with level of 4.81. Pt sustained head injury. Sent call out to on call neurosurgeon.     1540- Discussed case with ROBE Toledo for Dr Wade (neurosurgeon)  Reviewed history, exam, results and treatments.  Discussed concerns and plan of care. ROBE Toledo spoke to Dr Wade and they recommend that pt be admitted to medicine for overnight observation and repeat CT head tomorrow morning. If pt develops AMS, pt needs to undergo a repeat CT head sooner.     1545- Ordered preadmission blood work. Sent call out to [facility]. Admission decision time= 1545    1605- Rechecked pt. She is resting comfortably and is in no acute distress. BP is 156/99. O2 sat is 97% on room air. HR is in 50s. Discussed with pt and daughter about all pertinent results including INR of 4.81, CT head findings (no acute process), CT c-spine findings (no acute fracture), and t-spine xray findings. Informed pt and daughter that because pt's INR is supratherapeutic, she has an increased risk of bleeding and  "potential of delayed intracranial bleeding that is not visible on the initial CT head; therefore, we strongly recommend that pt be admitted for further care, observation, and repeat CT head tomorrow morning. Pt and daughter verbalize understanding and agreement with plan.     1617- Dr Clark discussed case with Dr Renteria ([facility] hospitalist)  Dr Clark reviewed history, exam, results and treatments. He also discussed concerns and plan of care. Dr Renteria accepts pt to be admitted to telemetry.      ADMISSION    Discussed treatment plan and reason for admission with pt/family and admitting physician.  Pt/family voiced understanding of the plan for admission for further testing/treatment as needed.      DIAGNOSIS  Final diagnoses:   Supratherapeutic INR   Injury of head, initial encounter   Contusion of thoracic back, unspecified laterality, initial encounter         COURSE & MEDICAL DECISION MAKING  Pertinent Labs and Imaging studies that were ordered and reviewed are noted above.  Results were reviewed/discussed with the patient and daughter and they were also made aware of online assess.   Pt and daughter also made aware that some labs, such as cultures, will not be resulted during ER visit and follow up with PMD is necessary.     MEDICATIONS GIVEN IN ER  Medications   Tdap (BOOSTRIX) injection 0.5 mL (0.5 mL Intramuscular Given 3/14/19 1459)       /99   Pulse 60   Temp 96.5 °F (35.8 °C) (Tympanic)   Resp 16   Ht 154.9 cm (61\")   Wt 57.2 kg (126 lb)   SpO2 97%   BMI 23.81 kg/m²       I personally reviewed the past medical history, past surgical history, social history, family history, current medications and allergies as they appear in this chart.  The scribe's note accurately reflects the work and decisions made by me.     Documentation assistance provided by john Rodriguez for JOSE Sales on 3/14/2019 at 4:34 PM. Information recorded by the elveribsilvino was done at my direction and has been " verified and validated by me.       Michael, Meaghann  03/14/19 1630       Dee Dee Robles, APRN  03/14/19 0901

## 2019-03-14 NOTE — ED NOTES
Pt c/o fall from standing while at home this morning, hit head on nightstand and caused laceration to posterior head and bruise to middle back. Pt denies any LOC. Pt does take warfarin.     Yury Vyas RN  03/14/19 4634

## 2019-03-14 NOTE — PLAN OF CARE
Problem: Patient Care Overview  Goal: Plan of Care Review  Outcome: Ongoing (interventions implemented as appropriate)   03/14/19 4822   Coping/Psychosocial   Plan of Care Reviewed With patient;daughter   Plan of Care Review   Progress no change   OTHER   Outcome Summary VSS; pt arrived from ER after fall at home. abrasion to back of head. pt is alert and oriented. passed bedside swallow

## 2019-03-14 NOTE — ED NOTES
Wound care provided to patients abrasion located to back of head. Cleaned with soap and NS.      Debbie Box, JEANNIE  03/14/19 1622

## 2019-03-14 NOTE — H&P
Patient Name:  Tala Bethea  YOB: 1930  MRN:  [id number]  Admit Date:  3/14/2019  Patient Care Team:  Peter Azevedo MD as PCP - General (Family Medicine)  Nat Gaston MD as PCP - Claims Attributed      Subjective   History Present Illness     Chief Complaint   Patient presents with   • Headache     hit head on side table, no LOC, on warfarin    • Fall     slipped out of recliner       Ms. Bethea is a 88 y.o. former smoker with a history of stable CAD, afib on warfarin, pacemaker and HTN that presents to [facility] complaining of a fall in her home.  She was in a normal state of health walking into her kitchen when she states she slipped on the floor.  She fell to the ground and hit her head on the cabinet.  She did not lose consciousness.  She did not sustain any other significant injury other than a minor bruise to her back.  There was blood on the ground and family found her approximately 30 minutes later.  They brought her to the emergency room to be evaluated.  She has had no alteration in mental status, nausea, vomiting etc.  Emergency room her INR was 4.8.    She checks her INR every Wednesday.  Last week her INR was 2.6 and 2 days ago was 3.8.  She sees Dr. Ike Rodriguez who instructed her to hold her Coumadin.  She did not receive it last night or today.    History of Present Illness  Review of Systems   Constitutional: Negative.    HENT: Negative.    Eyes: Negative.    Respiratory: Negative.    Cardiovascular: Negative.    Gastrointestinal: Negative.    Endocrine: Negative.    Genitourinary: Negative.    Musculoskeletal: Negative.    Skin: Negative.    Neurological: Negative.    Hematological: Negative.    Psychiatric/Behavioral: Negative.         Personal History     Past Medical History:   Diagnosis Date   • Arrhythmia    • Atherosclerosis of native coronary artery of native heart without angina pectoris    • Atrial fibrillation by electrocardiogram (CMS/[facility])      neg stress 4/10   • Fatigue    • Hyperlipidemia, mixed    • Hypertension, benign    • Mitral valve disorder     moderate MR per  echo - worsening c/w 2010 - prob component of her dyspnea   • Sick sinus syndrome (CMS/HCC)    • Status post placement of cardiac pacemaker      Past Surgical History:   Procedure Laterality Date   • CARDIAC ELECTROPHYSIOLOGY PROCEDURE N/A 5/15/2018    Procedure: PPM generator change - dual;  Surgeon: Mohan Joy MD;  Location:  [facility] CATH INVASIVE LOCATION;  Service: Cardiovascular   • CORONARY ANGIOPLASTY  2009    stent LAD   • INSERT / REPLACE / REMOVE PACEMAKER       Family History   Problem Relation Age of Onset   • Coronary artery disease Mother      Social History     Tobacco Use   • Smoking status: Former Smoker     Last attempt to quit:      Years since quittin.2   • Smokeless tobacco: Never Used   Substance Use Topics   • Alcohol use: No   • Drug use: No     Medications Prior to Admission   Medication Sig Dispense Refill Last Dose   • acetaminophen-codeine (TYLENOL #3) 300-30 MG per tablet Take 1 tablet by mouth Every 6 (Six) Hours As Needed for Moderate Pain . 15 tablet 0 Past Month at Unknown time   • aspirin 81 MG EC tablet Take 81 mg by mouth Daily.   3/13/2019 at Unknown time   • Calcium Carbonate (CALCIUM 600 PO) Take  by mouth.   3/13/2019 at Unknown time   • digoxin (LANOXIN) 125 MCG tablet Take 125 mcg by mouth Daily.   3/13/2019 at Unknown time   • escitalopram (LEXAPRO) 10 MG tablet Take 5 mg by mouth Daily.   3/13/2019 at Unknown time   • furosemide (LASIX) 40 MG tablet Take 40 mg by mouth Daily. Take one and a half tablets for a total of 60mg   3/13/2019 at Unknown time   • levothyroxine (SYNTHROID, LEVOTHROID) 50 MCG tablet Take 75 mcg by mouth Daily.   3/14/2019 at Unknown time   • omeprazole (priLOSEC) 40 MG capsule Take 40 mg by mouth Daily. 2 capsules daily   3/14/2019 at Unknown time   • potassium chloride (KLOR-CON) 20 MEQ packet Take 20  mEq by mouth Daily.   3/13/2019 at Unknown time   • simvastatin (ZOCOR) 10 MG tablet Take 10 mg by mouth Every Night.   3/13/2019 at Unknown time   • TOPROL  MG 24 hr tablet TAKE 1 TABLET DAILY 30 tablet 0 3/13/2019 at Unknown time   • warfarin (COUMADIN) 2.5 MG tablet Take one tablet by mouth daily, or as directed by the [facility]. 90 tablet 0 3/13/2019 at Unknown time     Allergies:  No Known Allergies    Objective    Objective     Vital Signs  Temp:  [96.5 °F (35.8 °C)-97.8 °F (36.6 °C)] 97.7 °F (36.5 °C)  Heart Rate:  [59-87] 69  Resp:  [16-18] 18  BP: (121-157)/() 133/69  SpO2:  [93 %-98 %] 96 %  on   ;   Device (Oxygen Therapy): room air  Body mass index is 23.81 kg/m².    Physical Exam   Constitutional: She is oriented to person, place, and time. She appears well-developed and well-nourished. No distress.   HENT:   Head: Normocephalic. Head is with abrasion. Head is without raccoon's eyes.   Small lump with dried blood on the back of her head.  No active bleeding.   Eyes: Conjunctivae and EOM are normal. No scleral icterus.   Neck: Normal range of motion. No JVD present.   Cardiovascular: Normal rate and regular rhythm.   Pulmonary/Chest: Effort normal and breath sounds normal.   Abdominal: Soft. Bowel sounds are normal. She exhibits no distension. There is no tenderness.   Musculoskeletal: Normal range of motion. She exhibits no edema.   Baseball sized superficial bruise in the middle of her back   Neurological: She is alert and oriented to person, place, and time.   Skin: Skin is warm and dry.   Psychiatric: She has a normal mood and affect. Her behavior is normal.   Nursing note and vitals reviewed.      Results Review:  I reviewed the patient's new clinical results.  I reviewed the patient's new imaging results and agree with the interpretation.  I reviewed the patient's other test results and agree with the interpretation  I personally viewed and interpreted the patient's  EKG/Telemetry data  Discussed with ED provider.      Lab Results (last 24 hours)     Procedure Component Value Units Date/Time    Protime-INR [[id number]]  (Abnormal) Collected:  03/14/19 1405    Specimen:  Blood Updated:  03/14/19 1445     Protime 44.3 Seconds      INR 4.81    aPTT [[id number]]  (Abnormal) Collected:  03/14/19 1405    Specimen:  Blood Updated:  03/14/19 1445     PTT 45.9 seconds     CBC & Differential [[id number]] Collected:  03/14/19 1559    Specimen:  Blood Updated:  03/14/19 1613    Narrative:       The following orders were created for panel order CBC & Differential.  Procedure                               Abnormality         Status                     ---------                               -----------         ------                     CBC Auto Differential[[id number]]        Abnormal            Final result                 Please view results for these tests on the individual orders.    Comprehensive Metabolic Panel [[id number]]  (Abnormal) Collected:  03/14/19 1559    Specimen:  Blood from Arm, Right Updated:  03/14/19 1635     Glucose 101 mg/dL      BUN 16 mg/dL      Creatinine 0.80 mg/dL      Sodium 144 mmol/L      Potassium 4.2 mmol/L      Chloride 104 mmol/L      CO2 27.5 mmol/L      Calcium 9.6 mg/dL      Total Protein 6.6 g/dL      Albumin 3.70 g/dL      ALT (SGPT) 12 U/L      AST (SGOT) 24 U/L      Alkaline Phosphatase 84 U/L      Total Bilirubin 1.2 mg/dL      eGFR Non African Amer 68 mL/min/1.73      Globulin 2.9 gm/dL      A/G Ratio 1.3 g/dL      BUN/Creatinine Ratio 20.0     Anion Gap 12.5 mmol/L     Narrative:       GFR Normal >60  Chronic Kidney Disease <60  Kidney Failure <15    CBC Auto Differential [[id number]]  (Abnormal) Collected:  03/14/19 1559    Specimen:  Blood from Arm, Right Updated:  03/14/19 1613     WBC 5.68 10*3/mm3      RBC 4.66 10*6/mm3      Hemoglobin 11.5 g/dL      Hematocrit 39.1 %      MCV 83.9 fL      MCH 24.7 pg      MCHC 29.4 g/dL      RDW 22.2 %      RDW-SD  66.4 fl      MPV 9.4 fL      Platelets 229 10*3/mm3      Neutrophil % 68.3 %      Lymphocyte % 21.1 %      Monocyte % 8.8 %      Eosinophil % 0.9 %      Basophil % 0.5 %      Immature Grans % 0.4 %      Neutrophils, Absolute 3.88 10*3/mm3      Lymphocytes, Absolute 1.20 10*3/mm3      Monocytes, Absolute 0.50 10*3/mm3      Eosinophils, Absolute 0.05 10*3/mm3      Basophils, Absolute 0.03 10*3/mm3      Immature Grans, Absolute 0.02 10*3/mm3      nRBC 0.0 /100 WBC           Imaging Results (last 24 hours)     Procedure Component Value Units Date/Time    CT Head Without Contrast [[id number]] Collected:  03/14/19 1459     Updated:  03/14/19 1459    Narrative:       HISTORY: Fell, head injury. Neck pain. 03/14/2019     CT OF THE BRAIN WITHOUT CONTRAST     Axial images were obtained through the brain without intravenous  contrast.     FINDINGS:  There is mild to moderate diffuse atrophy. There is decreased  attenuation of the periventricular white matter bilaterally consistent  with small vessel white matter ischemic disease. No intracranial  hemorrhage, midline shift, mass effect or acute infarction is seen.  Small scalp hematoma is seen posteriorly.     No skull fractures are seen.       Impression:       No acute intracranial process identified.        CT OF THE CERVICAL SPINE WITHOUT CONTRAST     Axial images were obtained through the cervical spine without contrast.  Sagittal and coronal reconstruction images were reviewed.     FINDINGS:  There is degenerative change of the C1-C2 articulation. There  is minimal anterolisthesis of C4 on C5. There is moderately severe C5-6  spondylosis.     No other subluxation is seen. No cervical spine fractures are seen.     IMPRESSION: Cervical degenerative disease as described. No fractures are  seen.     Radiation dose reduction techniques were utilized, including automated  exposure control and exposure modulation based on body size.          CT Cervical Spine Without Contrast  [[id number]] Collected:  03/14/19 1459     Updated:  03/14/19 1459    Narrative:       HISTORY: Fell, head injury. Neck pain. 03/14/2019     CT OF THE BRAIN WITHOUT CONTRAST     Axial images were obtained through the brain without intravenous  contrast.     FINDINGS:  There is mild to moderate diffuse atrophy. There is decreased  attenuation of the periventricular white matter bilaterally consistent  with small vessel white matter ischemic disease. No intracranial  hemorrhage, midline shift, mass effect or acute infarction is seen.  Small scalp hematoma is seen posteriorly.     No skull fractures are seen.       Impression:       No acute intracranial process identified.        CT OF THE CERVICAL SPINE WITHOUT CONTRAST     Axial images were obtained through the cervical spine without contrast.  Sagittal and coronal reconstruction images were reviewed.     FINDINGS:  There is degenerative change of the C1-C2 articulation. There  is minimal anterolisthesis of C4 on C5. There is moderately severe C5-6  spondylosis.     No other subluxation is seen. No cervical spine fractures are seen.     IMPRESSION: Cervical degenerative disease as described. No fractures are  seen.     Radiation dose reduction techniques were utilized, including automated  exposure control and exposure modulation based on body size.          XR Spine Thoracic 3 View [[id number]] Collected:  03/14/19 1443     Updated:  03/14/19 1451    Narrative:       XR SPINE THORACIC 3 VW-     INDICATIONS:    Trauma     TECHNIQUE: 3 views of the thoracic spine.        COMPARISON: None available     FINDINGS:      A compression deformity at T12, age indeterminate, with about 10% loss  of height apparent, correlate with location of pain. Vertebral body  heights otherwise appear preserved. Multilevel endplate spurring. Disc  space narrowing is apparent at the lower cervical levels. Cardiac  pacemaker and leads are partly included. The heart size, although not  well  characterized with this technique, appears enlarged. Opacity  projecting posteriorly at the base on the lateral view, presumably  related to large hiatal hernia, but indeterminate on the basis of this  exam, compared with prior exams if they can be obtained, otherwise  further evaluation of the chest is suggested such as initial further  evaluation with PA and lateral chest x-ray.       Impression:             1. Opacity posteriorly at the lung base, as described, further  evaluation is advised.  2. Evidence of age-indeterminate T12 compression fracture, correlate  with location of pain. No other evidence of thoracic compression  fracture is identified. For further evaluation, if indicated, CT can be  considered.     This report was finalized on 3/14/2019 2:47 PM by Dr. René Eastman M.D.             Results for orders placed in visit on 06/26/17   Adult Transthoracic Echo Complete    Narrative · Left ventricular wall thickness is consistent with mild concentric   hypertrophy.  · Left ventricular systolic function is normal. Estimated ejection fractio   is 55-60%.  · Left ventricular diastolic dysfunction (grade II) consistent with   pseudonormalization.  · Right ventricular cavity is mildly dilated. Systolic function is normal.  · There is severe biatrial enlargement.  · Moderate mitral valve regurgitation is present  · Moderate to severe tricuspid valve regurgitation is present.          No orders to display        Assessment/Plan     Active Hospital Problems    Diagnosis Date Noted   • **Head injury, closed, without LOC [S09.90XA] 03/14/2019   • Supratherapeutic INR [R79.1] 03/14/2019   • Mitral valve insufficiency [I34.0] 06/26/2017   • Mixed hyperlipidemia [E78.2] 06/26/2017   • Persistent atrial fibrillation (CMS/HCC) [I48.1] 06/26/2017       Ms. Bethea is a 88 y.o. former smoker with a history of stable CAD, afib on warfarin, pacemaker and HTN who is admitted for further observation after falling and  "striking her head and finding that INR is elevated at 4.8.  Her initial head CT shows no sign of ICH.    · It was recommended by neurosurgery patient be admitted for repeat head CT in a.m.  · Will consult them to review in a.m.  · Will obtain chest x-ray in a.m. to follow-up \"opacity\" seen on thoracic x-ray.  Family states she has had some sort of abnormality on previous chest x-rays they were told was benign.  · Will repeat INR in a.m.  Would generally not reverse an INR of 4.8.  Will check w/ ANDREA for recs.  · Neurochecks.   · Family concerned about some mild swelling in PARKER they state began after receiving tetanus shot in the emergency room.  She denies injuring her arm and denies any pain whatsoever.  There is some mild swelling mainly the medial aspect of her elbow but there is no tenderness, erythema or warmth.  Advised that we elevate on pillows overnight and reassess in the a.m.  Very unlikely to be DVT given she is on chronic anticoagulation.  · Other medical issues appear stable.   · I discussed the patients findings and my recommendations with patient and family.       Jordan Renteria MD  [facility]  03/14/19  11:18 PM      "

## 2019-03-15 ENCOUNTER — APPOINTMENT (OUTPATIENT)
Dept: CT IMAGING | Facility: HOSPITAL | Age: 84
End: 2019-03-15

## 2019-03-15 ENCOUNTER — APPOINTMENT (OUTPATIENT)
Dept: GENERAL RADIOLOGY | Facility: HOSPITAL | Age: 84
End: 2019-03-15

## 2019-03-15 VITALS
RESPIRATION RATE: 18 BRPM | HEART RATE: 60 BPM | DIASTOLIC BLOOD PRESSURE: 82 MMHG | SYSTOLIC BLOOD PRESSURE: 137 MMHG | TEMPERATURE: 97.3 F | WEIGHT: 126 LBS | OXYGEN SATURATION: 97 % | BODY MASS INDEX: 23.79 KG/M2 | HEIGHT: 61 IN

## 2019-03-15 LAB
INR PPP: 4.39 (ref 0.9–1.1)
PROTHROMBIN TIME: 41.3 SECONDS (ref 11.7–14.2)

## 2019-03-15 PROCEDURE — 71046 X-RAY EXAM CHEST 2 VIEWS: CPT

## 2019-03-15 PROCEDURE — 97161 PT EVAL LOW COMPLEX 20 MIN: CPT

## 2019-03-15 PROCEDURE — 70450 CT HEAD/BRAIN W/O DYE: CPT

## 2019-03-15 PROCEDURE — 99204 OFFICE O/P NEW MOD 45 MIN: CPT | Performed by: NURSE PRACTITIONER

## 2019-03-15 PROCEDURE — G0378 HOSPITAL OBSERVATION PER HR: HCPCS

## 2019-03-15 PROCEDURE — 85610 PROTHROMBIN TIME: CPT | Performed by: HOSPITALIST

## 2019-03-15 RX ORDER — WARFARIN SODIUM 2.5 MG/1
2.5 TABLET ORAL NIGHTLY
Qty: 90 TABLET | Refills: 0
Start: 2019-03-18 | End: 2019-04-17 | Stop reason: SDUPTHER

## 2019-03-15 RX ADMIN — LEVOTHYROXINE SODIUM 75 MCG: 75 TABLET ORAL at 06:16

## 2019-03-15 RX ADMIN — POTASSIUM CHLORIDE 20 MEQ: 1.5 POWDER, FOR SOLUTION ORAL at 08:13

## 2019-03-15 RX ADMIN — PANTOPRAZOLE SODIUM 40 MG: 40 TABLET, DELAYED RELEASE ORAL at 06:16

## 2019-03-15 RX ADMIN — FUROSEMIDE 60 MG: 20 TABLET ORAL at 08:13

## 2019-03-15 RX ADMIN — DIGOXIN 125 MCG: 125 TABLET ORAL at 11:58

## 2019-03-15 NOTE — NURSING NOTE
Spoke w Neurosurgery nurse practitioner in regards to pts INR. She states that they are not consulted and she cannot give any recommendations at this time. Consult is being called now and will attempt to contact again.

## 2019-03-15 NOTE — PROGRESS NOTES
Continued Stay Note  [facility]     Patient Name: Tala Bethea  MRN: [id number]  Today's Date: 3/15/2019    Admit Date: 3/14/2019    Discharge Plan     Row Name 03/15/19 1139       Plan    Plan  Home with [facility]    Patient/Family in Agreement with Plan  yes    Plan Comments  Pt lives with daughter.  Requested information on home agencies that would just come and sit with pt when she was not there.  Stated they are current with [facility] and pt is receiving PT service.  Referral placed to [facility]. Called and notified [facility] as well.  List of home agencies for in home personal care given to daughter.  Pt has a walker, cane, and a stair chair lift.  No further Needs identified.  Luis Luong RN-BC        Discharge Codes    No documentation.             Luis Luong RN

## 2019-03-15 NOTE — PLAN OF CARE
Problem: Patient Care Overview  Goal: Plan of Care Review   03/15/19 1133   OTHER   Outcome Summary Pt admitted s/p fall at home. Per RN, okay to be seen despite high INR. Previously independent with mobility with Rwx at home. Upon eval, decreased standing balance and decreased endurance. Will see pt to improve mobility and independence. Anticipate d/c home with [facility] PT when medically able. Daughter inquired about caregiver service that could stay with pt during day while she was at work. Nursing to pass on to CCP. Will progress towards goals as tolerated.

## 2019-03-15 NOTE — PLAN OF CARE
Problem: Patient Care Overview  Goal: Plan of Care Review  Outcome: Ongoing (interventions implemented as appropriate)   03/15/19 0615   Coping/Psychosocial   Plan of Care Reviewed With patient   Plan of Care Review   Progress improving   OTHER   Outcome Summary Patient stable and resting in bed, no change in assessment noted. Dr. Renteria aware of left arm swollen. Daughter visited at bedside last night. Patient slept well, no distress noted. Patient denies any pain or discomfort during this shift. Critical INR 4.3 noted and called in to Mary Harmon, no new order noted. Will continue to monitor patient.     Goal: Discharge Needs Assessment  Outcome: Ongoing (interventions implemented as appropriate)

## 2019-03-15 NOTE — CONSULTS
"Patient name: Tala Bethea  Referring Provider: Dr Renteria  Reason for Consultation: supra therapeutic INR s/p fall     Patient Care Team:  Peter Azevedo MD as PCP - General (Family Medicine)  Nat Gaston MD as PCP - Claims Attributed    Chief complaint: fall/confusion     Subjective .     History of present illness:    Patient is a very pleasant 88 y.o. right handed female of [place] descent who presents s/p unwitnessed fall at her home yesterday afternoon.  The patient's daughter came home from work for lunch and found her mother down on the floor in front of her recliner.  Daughter thinks she may have hit her head on the dog crate that sits close to the recliner.  She denies loss of consciousness but does report hitting the back of her head where she sustained an abrasion.  She was brought to the ER for further evaluation.  The patient is on Coumadin for atrial fibrillation.  INR on arrival was found to be supratherapeutic at 4.81.  CT head imaging was obtained revealing stable findings.  Neurosurgery consulted for the head injury in lieu of elevated INR.    The patient denies any headaches, dizziness or lightheadedness.  She is moving all of her extremities well and denies any pain, numbness, tingling or weakness.  She does have some tenderness at the site of the right occipital laceration, but otherwise is feeling well.  She states that she gets weekly INR checks at home followed by Dr. Rodriguez's office.      /93 (BP Location: Left arm, Patient Position: Lying)   Pulse 63   Temp 97.6 °F (36.4 °C) (Oral)   Resp 18   Ht 154.9 cm (61\")   Wt 57.2 kg (126 lb)   SpO2 96%   BMI 23.81 kg/m²       Review of Systems  Review of Systems   Constitutional: Negative.    HENT: Negative.    Eyes: Negative.    Respiratory: Negative.    Cardiovascular: Negative.    Endocrine: Negative.    Genitourinary: Negative.    Musculoskeletal: Positive for gait problem.   Skin: Positive for wound.   Allergic/Immunologic: " Negative.    Neurological: Negative for dizziness, tremors, seizures, facial asymmetry, speech difficulty, weakness and headaches.   Hematological: Bruises/bleeds easily.   Psychiatric/Behavioral: Positive for confusion.       History  PAST MEDICAL HISTORY  Past Medical History:   Diagnosis Date   • Arrhythmia    • Atherosclerosis of native coronary artery of native heart without angina pectoris    • Atrial fibrillation by electrocardiogram (CMS/HCC)     neg stress 4/10   • Fatigue    • Hyperlipidemia, mixed    • Hypertension, benign    • Mitral valve disorder     moderate MR per  echo - worsening c/w  - prob component of her dyspnea   • Sick sinus syndrome (CMS/HCC)    • Status post placement of cardiac pacemaker        PAST SURGICAL HISTORY  Past Surgical History:   Procedure Laterality Date   • CARDIAC ELECTROPHYSIOLOGY PROCEDURE N/A 5/15/2018    Procedure: PPM generator change - dual;  Surgeon: Mohan Joy MD;  Location: [facility] CATH INVASIVE LOCATION;  Service: Cardiovascular   • CORONARY ANGIOPLASTY  2009    stent LAD   • INSERT / REPLACE / REMOVE PACEMAKER         FAMILY HISTORY  Family History   Problem Relation Age of Onset   • Coronary artery disease Mother        SOCIAL HISTORY  Social History     Tobacco Use   • Smoking status: Former Smoker     Last attempt to quit: 1992     Years since quittin.2   • Smokeless tobacco: Never Used   Substance Use Topics   • Alcohol use: No   • Drug use: No       Allergies:  Patient has no known allergies.    MEDICATIONS:  Medications Prior to Admission   Medication Sig Dispense Refill Last Dose   • acetaminophen-codeine (TYLENOL #3) 300-30 MG per tablet Take 1 tablet by mouth Every 6 (Six) Hours As Needed for Moderate Pain . 15 tablet 0 Past Month at Unknown time   • aspirin 81 MG EC tablet Take 81 mg by mouth Daily.   3/13/2019 at Unknown time   • Calcium Carbonate (CALCIUM 600 PO) Take  by mouth.   3/13/2019 at Unknown time   • digoxin (LANOXIN) 125  MCG tablet Take 125 mcg by mouth Daily.   3/13/2019 at Unknown time   • escitalopram (LEXAPRO) 10 MG tablet Take 5 mg by mouth Daily.   3/13/2019 at Unknown time   • furosemide (LASIX) 40 MG tablet Take 40 mg by mouth Daily. Take one and a half tablets for a total of 60mg   3/13/2019 at Unknown time   • levothyroxine (SYNTHROID, LEVOTHROID) 50 MCG tablet Take 75 mcg by mouth Daily.   3/14/2019 at Unknown time   • omeprazole (priLOSEC) 40 MG capsule Take 40 mg by mouth Daily. 2 capsules daily   3/14/2019 at Unknown time   • potassium chloride (KLOR-CON) 20 MEQ packet Take 20 mEq by mouth Daily.   3/13/2019 at Unknown time   • simvastatin (ZOCOR) 10 MG tablet Take 10 mg by mouth Every Night.   3/13/2019 at Unknown time   • TOPROL  MG 24 hr tablet TAKE 1 TABLET DAILY 30 tablet 0 3/13/2019 at Unknown time   • [DISCONTINUED] warfarin (COUMADIN) 2.5 MG tablet Take one tablet by mouth daily, or as directed by the [facility]. 90 tablet 0 3/13/2019 at Unknown time       Objective     Results Review:  LABS:    Admission on 03/14/2019   Component Date Value Ref Range Status   • Protime 03/14/2019 44.3* 11.7 - 14.2 Seconds Final   • INR 03/14/2019 4.81* 0.90 - 1.10 Final   • PTT 03/14/2019 45.9* 22.7 - 35.4 seconds Final   • Glucose 03/14/2019 101* 65 - 99 mg/dL Final   • BUN 03/14/2019 16  8 - 23 mg/dL Final   • Creatinine 03/14/2019 0.80  0.57 - 1.00 mg/dL Final   • Sodium 03/14/2019 144  136 - 145 mmol/L Final   • Potassium 03/14/2019 4.2  3.5 - 5.2 mmol/L Final   • Chloride 03/14/2019 104  98 - 107 mmol/L Final   • CO2 03/14/2019 27.5  22.0 - 29.0 mmol/L Final   • Calcium 03/14/2019 9.6  8.6 - 10.5 mg/dL Final   • Total Protein 03/14/2019 6.6  6.0 - 8.5 g/dL Final   • Albumin 03/14/2019 3.70  3.50 - 5.20 g/dL Final   • ALT (SGPT) 03/14/2019 12  1 - 33 U/L Final   • AST (SGOT) 03/14/2019 24  1 - 32 U/L Final   • Alkaline Phosphatase 03/14/2019 84  39 - 117 U/L Final   • Total Bilirubin 03/14/2019 1.2   0.1 - 1.2 mg/dL Final   • eGFR Non African Amer 03/14/2019 68  >60 mL/min/1.73 Final   • Globulin 03/14/2019 2.9  gm/dL Final   • A/G Ratio 03/14/2019 1.3  g/dL Final   • BUN/Creatinine Ratio 03/14/2019 20.0  7.0 - 25.0 Final   • Anion Gap 03/14/2019 12.5  mmol/L Final   • WBC 03/14/2019 5.68  3.40 - 10.80 10*3/mm3 Final   • RBC 03/14/2019 4.66  3.77 - 5.28 10*6/mm3 Final   • Hemoglobin 03/14/2019 11.5* 12.0 - 15.9 g/dL Final   • Hematocrit 03/14/2019 39.1  34.0 - 46.6 % Final   • MCV 03/14/2019 83.9  79.0 - 97.0 fL Final   • MCH 03/14/2019 24.7* 26.6 - 33.0 pg Final   • MCHC 03/14/2019 29.4* 31.5 - 35.7 g/dL Final   • RDW 03/14/2019 22.2* 12.3 - 15.4 % Final   • RDW-SD 03/14/2019 66.4* 37.0 - 54.0 fl Final   • MPV 03/14/2019 9.4  6.0 - 12.0 fL Final   • Platelets 03/14/2019 229  140 - 450 10*3/mm3 Final   • Neutrophil % 03/14/2019 68.3  42.7 - 76.0 % Final   • Lymphocyte % 03/14/2019 21.1  19.6 - 45.3 % Final   • Monocyte % 03/14/2019 8.8  5.0 - 12.0 % Final   • Eosinophil % 03/14/2019 0.9  0.3 - 6.2 % Final   • Basophil % 03/14/2019 0.5  0.0 - 1.5 % Final   • Immature Grans % 03/14/2019 0.4  0.0 - 0.5 % Final   • Neutrophils, Absolute 03/14/2019 3.88  1.40 - 7.00 10*3/mm3 Final   • Lymphocytes, Absolute 03/14/2019 1.20  0.70 - 3.10 10*3/mm3 Final   • Monocytes, Absolute 03/14/2019 0.50  0.10 - 0.90 10*3/mm3 Final   • Eosinophils, Absolute 03/14/2019 0.05  0.00 - 0.40 10*3/mm3 Final   • Basophils, Absolute 03/14/2019 0.03  0.00 - 0.20 10*3/mm3 Final   • Immature Grans, Absolute 03/14/2019 0.02  0.00 - 0.05 10*3/mm3 Final   • nRBC 03/14/2019 0.0  0.0 - 0.0 /100 WBC Final   • Protime 03/15/2019 41.3* 11.7 - 14.2 Seconds Final   • INR 03/15/2019 4.39* 0.90 - 1.10 Final       DIAGNOSTICS:  CT head from [facility] dated March 15th 2019 reveals no acute abnormalities    Results Review:   I reviewed the patient's new clinical results.  I personally viewed and interpreted the patient's CT head    Vital Signs    Temp:  [96.5 °F (35.8 °C)-97.8 °F (36.6 °C)] 97.6 °F (36.4 °C)  Heart Rate:  [59-87] 63  Resp:  [16-18] 18  BP: (121-157)/() 139/93    Physical Exam:  Physical Exam   Constitutional: She is oriented to person, place, and time. She appears well-developed and well-nourished. She is cooperative.  Non-toxic appearance. She does not have a sickly appearance. She does not appear ill.   Very pleasant well-appearing elderly female, no acute distress   HENT:   Head: Normocephalic.   Nickel sized abrasion right occipital region   Eyes: EOM are normal. Pupils are equal, round, and reactive to light.   Neck: Neck supple. No tracheal deviation present.   Cardiovascular: Intact distal pulses.   Pulmonary/Chest: Effort normal.   Abdominal: Soft.   Musculoskeletal: Normal range of motion. She exhibits edema (Pitting edema left medial forearm). She exhibits no tenderness.   Moving all extremities well   Neurological: She is alert and oriented to person, place, and time. She displays no tremor and normal reflexes. No cranial nerve deficit or sensory deficit. She exhibits normal muscle tone. Gait abnormal. Coordination normal. GCS eye subscore is 4. GCS verbal subscore is 5. GCS motor subscore is 6.   Speech is clear and articulate  Alert and oriented to person and place  Cranial nerves II through XII grossly intact  Negative drift, negative tremors  Finger-nose intact  Gait is stable with assistance using a walker   Skin: Skin is warm and dry.   Psychiatric: She has a normal mood and affect. Her behavior is normal. Thought content normal.   Vitals reviewed.    Neurologic Exam     Mental Status   Oriented to person, place, and time.     Cranial Nerves     CN III, IV, VI   Pupils are equal, round, and reactive to light.  Extraocular motions are normal.       Assessment/Plan       Head injury, closed, without LOC    Persistent atrial fibrillation (CMS/HCC)    Mixed hyperlipidemia    Mitral valve insufficiency    Supratherapeutic  INR      PLAN: Neurosurgery consulted for head injury status post fall with elevated INR.  The patient is doing very well and appears to be at her baseline, according to the daughter at bedside.  She is neuro intact with the exception of slight confusion.  CT head shows stable findings with no sign of hemorrhage.  INR is still elevated this morning at 4.3.  Given that her INR remains high, may consider discussion with cardiology regarding need for reversal.  Patient's daughter is also concerned regarding Coumadin dosing once they are discharged.    From our standpoint she is stable and there is nothing today.  Discussed with the patient and her daughter possibility of getting a follow-up head CT to ensure there are no new abnormalities, they declined. We will sign off, thanks for the consult.     I discussed the patients findings and my recommendations with patient, family, nursing staff and Dr Mauro Albarran, APRN  03/15/19  11:04 AM    Dragon disclaimer:   Much of this encounter note is an electronic transcription/translation of spoken language to printed text. The electronic translation of spoken language may permit erroneous, or at times, nonsensical words or phrases to be inadvertently transcribed; Although I have reviewed the note for such errors, some may still exist.

## 2019-03-15 NOTE — THERAPY EVALUATION
Acute Care - Physical Therapy Initial Evaluation  [facility]     Patient Name: Tala Bethea  : 1930  MRN: [id number]  Today's Date: 3/15/2019         Referring Physician: Horacio      Admit Date: 3/14/2019    Visit Dx:     ICD-10-CM ICD-9-CM   1. Supratherapeutic INR R79.1 790.92   2. Injury of head, initial encounter S09.90XA 959.01   3. Contusion of thoracic back, unspecified laterality, initial encounter S20.229A 922.31   4. Decreased mobility R26.89 781.99     Patient Active Problem List   Diagnosis   • Persistent atrial fibrillation (CMS/HCC)   • Mixed hyperlipidemia   • Mitral valve insufficiency   • Pacemaker   • Sick sinus syndrome (CMS/HCC)   • Hypothyroid   • Supratherapeutic INR   • Head injury, closed, without LOC     Past Medical History:   Diagnosis Date   • Arrhythmia    • Atherosclerosis of native coronary artery of native heart without angina pectoris    • Atrial fibrillation by electrocardiogram (CMS/HCC)     neg stress 4/10   • Fatigue    • Hyperlipidemia, mixed    • Hypertension, benign    • Mitral valve disorder     moderate MR per  echo - worsening c/w  - prob component of her dyspnea   • Sick sinus syndrome (CMS/HCC)    • Status post placement of cardiac pacemaker      Past Surgical History:   Procedure Laterality Date   • CARDIAC ELECTROPHYSIOLOGY PROCEDURE N/A 5/15/2018    Procedure: PPM generator change - dual;  Surgeon: Mohan Joy MD;  Location: [facility] CATH INVASIVE LOCATION;  Service: Cardiovascular   • CORONARY ANGIOPLASTY  2009    stent LAD   • INSERT / REPLACE / REMOVE PACEMAKER          PT ASSESSMENT (last 12 hours)      Physical Therapy Evaluation     Row Name 03/15/19 1105          PT Evaluation Time/Intention    Subjective Information  no complaints  -MA     Document Type  evaluation  -MA     Mode of Treatment  physical therapy;individual therapy  -MA     Patient Effort  good  -MA     Symptoms Noted During/After Treatment  fatigue  -MA     Row Name  03/15/19 1105          General Information    Referring Physician  Horacio  -MA     Patient Observations  alert;cooperative;agree to therapy  -MA     Patient/Family Observations  sitting up in chair, no acute distress, daughter present   -MA     Prior Level of Function  independent:  -MA     Equipment Currently Used at Home  walker, rolling;rollator  -MA     Pertinent History of Current Functional Problem  Fell at home, hit head, high INR  -MA     Existing Precautions/Restrictions  fall  -MA     Barriers to Rehab  none identified  -MA     Row Name 03/15/19 1105          Relationship/Environment    Lives With  child(javier), adult at home sometimes by self   -MA     Row Name 03/15/19 1105          Home Main Entrance    Number of Stairs, Main Entrance  three  -MA     Row Name 03/15/19 1105          Cognitive Assessment/Interventions    Additional Documentation  Cognitive Assessment/Intervention (Group)  -MA     Row Name 03/15/19 1105          Cognitive Assessment/Intervention- PT/OT    Orientation Status (Cognition)  oriented to;person;place;situation  -MA     Follows Commands (Cognition)  follows one step commands;over 90% accuracy;increased processing time needed;verbal cues/prompting required  -MA     Safety Deficit (Cognitive)  mild deficit;awareness of need for assistance;insight into deficits/self awareness  -MA     Personal Safety Interventions  fall prevention program maintained;gait belt;muscle strengthening facilitated;nonskid shoes/slippers when out of bed;supervised activity  -MA     Row Name 03/15/19 1105          Safety Issues, Functional Mobility    Impairments Affecting Function (Mobility)  balance;endurance/activity tolerance;strength  -MA     Row Name 03/15/19 1105          Bed Mobility Assessment/Treatment    Comment (Bed Mobility)  NT- UIC  -MA     Row Name 03/15/19 1105          Transfer Assessment/Treatment    Transfer Assessment/Treatment  sit-stand transfer;stand-sit transfer  -MA     Sit-Stand  [place] (Transfers)  contact guard  -MA     Stand-Sit [place] (Transfers)  contact guard  -MA     Row Name 03/15/19 1105          Sit-Stand Transfer    Assistive Device (Sit-Stand Transfers)  walker, front-wheeled  -MA     Row Name 03/15/19 1105          Stand-Sit Transfer    Assistive Device (Stand-Sit Transfers)  walker, front-wheeled  -MA     Row Name 03/15/19 1105          Gait/Stairs Assessment/Training    [place] Level (Gait)  contact guard;minimum assist (75% patient effort)  -MA     Assistive Device (Gait)  walker, front-wheeled  -MA     Distance in Feet (Gait)  100  -MA     Pattern (Gait)  step-through  -MA     Deviations/Abnormal Patterns (Gait)  js decreased;base of support, narrow;stride length decreased  -MA     Comment (Gait/Stairs)  mild path deviations especially when going around corners. mild unsteadiness throughout   -MA     Row Name 03/15/19 1105          General ROM    GENERAL ROM COMMENTS  B LEs WFL   -[facility] Name 03/15/19 1105          MMT (Manual Muscle Testing)    General MMT Comments  B LEs grossly 4/5  -[facility] Name 03/15/19 1105          Motor Assessment/Intervention    Additional Documentation  Balance (Group)  -MA     Row Name 03/15/19 1105          Balance    Balance  static standing balance  -[facility] Name 03/15/19 1105          Static Standing Balance    Level of [place] (Supported Standing, Static Balance)  contact guard assist  -MA     Time Able to Maintain Position (Supported Standing, Static Balance)  1 to 2 minutes  -MA     Assistive Device Utilized (Supported Standing, Static Balance)  walker, rolling  -MA     Row Name 03/15/19 1105          Pain Assessment    Additional Documentation  Pain Scale: Word Pre/Post-Treatment (Group)  -MA     Row Name 03/15/19 1105          Pain Scale: Numbers Pre/Post-Treatment    Pain Location  head  -MA     Pain Intervention(s)  Repositioned;Ambulation/increased activity  -MA     Row Name 03/15/19 1105           Pain Scale: Word Pre/Post-Treatment    Pain: Word Scale, Pretreatment  2 - mild pain  -MA     Pain: Word Scale, Post-Treatment  2 - mild pain  -MA     Row Name 03/15/19 1105          Physical Therapy Clinical Impression    Patient/Family Goals Statement (PT Clinical Impression)  return home   -MA     Criteria for Skilled Interventions Met (PT Clinical Impression)  yes;treatment indicated  -MA     Impairments Found (describe specific impairments)  aerobic capacity/endurance;gait, locomotion, and balance;muscle performance  -MA     Rehab Potential (PT Clinical Summary)  good, to achieve stated therapy goals  -MA     Row Name 03/15/19 1105          Vital Signs    O2 Delivery Pre Treatment  room air  -MA     Row Name 03/15/19 1105          Physical Therapy Goals    Bed Mobility Goal Selection (PT)  bed mobility, PT goal 1  -MA     Transfer Goal Selection (PT)  transfer, PT goal 1  -MA     Gait Training Goal Selection (PT)  gait training, PT goal 1  -MA     Stairs Goal Selection (PT)  stairs, PT goal 1  -MA     Additional Documentation  Stairs Goal Selection (PT) (Row)  -MA     Row Name 03/15/19 1105          Bed Mobility Goal 1 (PT)    Activity/Assistive Device (Bed Mobility Goal 1, PT)  bed mobility activities, all  -MA     [place] Level/Cues Needed (Bed Mobility Goal 1, PT)  independent  -MA     Time Frame (Bed Mobility Goal 1, PT)  1 week  -MA     Row Name 03/15/19 1105          Transfer Goal 1 (PT)    Activity/Assistive Device (Transfer Goal 1, PT)  sit-to-stand/stand-to-sit;bed-to-chair/chair-to-bed;walker, rolling  -MA     [place] Level/Cues Needed (Transfer Goal 1, PT)  conditional independence  -MA     Time Frame (Transfer Goal 1, PT)  1 week  -MA     Row Name 03/15/19 1105          Gait Training Goal 1 (PT)    Activity/Assistive Device (Gait Training Goal 1, PT)  gait (walking locomotion);walker, rolling  -MA     [place] Level (Gait Training Goal 1, PT)  conditional independence  -MA     Distance  (Gait Goal 1, PT)  150  -MA     Time Frame (Gait Training Goal 1, PT)  1 week  -MA     Row Name 03/15/19 1105          Stairs Goal 1 (PT)    Activity/Assistive Device (Stairs Goal 1, PT)  ascending stairs;descending stairs  -MA     [place] Level/Cues Needed (Stairs Goal 1, PT)  conditional independence  -MA     Number of Stairs (Stairs Goal 1, PT)  3  -MA     Time Frame (Stairs Goal 1, PT)  1 week  -MA     Row Name 03/15/19 1105          Positioning and Restraints    Pre-Treatment Position  sitting in chair/recliner  -MA     Post Treatment Position  chair  -MA     In Chair  sitting;call light within reach;encouraged to call for assist;notified nsg;with family/caregiver no alarm on when entering room   -MA     Row Name 03/15/19 1105          Living Environment    Home Accessibility  stairs to enter home chair lift to 2nd level (doesn't have to use)   -MA       User Key  (r) = Recorded By, (t) = Taken By, (c) = Cosigned By    Initials Name Provider Type    Amanda Negron, AIXA Physical Therapist        Physical Therapy Education     Title: PT OT SLP Therapies (In Progress)     Topic: Physical Therapy (In Progress)     Point: Mobility training (Done)     Learning Progress Summary           Patient Acceptance, E, VU,NR by MA at 3/15/2019 11:33 AM                   Point: Precautions (Done)     Learning Progress Summary           Patient Acceptance, E, VU,NR by MA at 3/15/2019 11:33 AM                               User Key     Initials Effective Dates Name Provider Type [facility] 10/19/18 -  Amanda Lewis, AIXA Physical Therapist PT              PT Recommendation and Plan  Anticipated Discharge Disposition (PT): home with assist, home with home health  Planned Therapy Interventions (PT Eval): balance training, gait training, bed mobility training, patient/family education, strengthening, transfer training, stair training  Therapy Frequency (PT Clinical Impression): daily  Outcome Summary/Treatment Plan  (PT)  Anticipated Discharge Disposition (PT): home with assist, home with home health  Outcome Summary: Pt admitted s/p fall at home. Per RN, okay to be seen despite high INR. Previously independent with mobility with Rwx at home. Upon eval, decreased standing balance and decreased endurance. Will see pt to improve mobility and independence. Anticipate d/c home with [facility] PT when medically able. Daughter inquired about caregiver service that could stay with pt during day while she was at work. Nursing to pass on to [facility]. Will progress towards goals as tolerated.   Outcome Measures     Row Name 03/15/19 1100             How much help from another person do you currently need...    Turning from your back to your side while in flat bed without using bedrails?  4  -MA      Moving from lying on back to sitting on the side of a flat bed without bedrails?  3  -MA      Moving to and from a bed to a chair (including a wheelchair)?  3  -MA      Standing up from a chair using your arms (e.g., wheelchair, bedside chair)?  3  -MA      Climbing 3-5 steps with a railing?  2  -MA      To walk in hospital room?  3  -MA      AM-PAC 6 Clicks Score  18  -MA         Functional Assessment    Outcome Measure Options  AM-PAC 6 Clicks Basic Mobility (PT)  -MA        User Key  (r) = Recorded By, (t) = Taken By, (c) = Cosigned By    Initials Name Provider Type    Amanda Negron, PT Physical Therapist         Time Calculation:   PT Charges     Row Name 03/15/19 1135             Time Calculation    Start Time  1057  -MA      Stop Time  1105  -MA      Time Calculation (min)  8 min  -MA      PT Received On  03/15/19  -MA      PT - Next Appointment  03/16/19  -MA      PT Goal Re-Cert Due Date  03/22/19  -MA        User Key  (r) = Recorded By, (t) = Taken By, (c) = Cosigned By    Initials Name Provider Type    Amanda Negron, PT Physical Therapist        Therapy Suggested Charges     Code   Minutes Charges    None           Therapy Charges for Today      Code Description Service Date Service Provider Modifiers Qty    [id number] HC PT EVAL LOW COMPLEXITY 2 3/15/2019 Amanda Lewis, PT GP 1          PT G-Codes  Outcome Measure Options: AM-PAC 6 Clicks Basic Mobility (PT)  AM-PAC 6 Clicks Score: 18      Amanda Lewis, AIXA  3/15/2019

## 2019-03-15 NOTE — DISCHARGE PLACEMENT REQUEST
"Mellisa Velasquez (88 y.o. Female)     Date of Birth Social Security Number Address Home Phone MRN    12/28/1930  [address] [place] [address] [phone] [id number]    Christianity Marital Status          Druze        Admission Date Admission Type Admitting Provider Attending Provider Department, Room/Bed    3/14/19 Emergency Ray, MD Horacio Ortega Stephen J, MD [address], [address]/1    Discharge Date Discharge Disposition Discharge Destination                       Attending Provider:  Óscar Leonard MD    Allergies:  No Known Allergies    Isolation:  None   Infection:  None   Code Status:  CPR    Ht:  154.9 cm (61\")   Wt:  57.2 kg (126 lb)    Admission Cmt:  None   Principal Problem:  Head injury, closed, without LOC [S09.90XA]                 Active Insurance as of 3/14/2019     Primary Coverage     Payor Plan Insurance Group Employer/Plan Group    MEDICARE MEDICARE A & B      Payor Plan Address Payor Plan Phone Number Payor Plan Fax Number Effective Dates    [address] [phone]  12/1/1995 - None Entered    [facility] [address]       Subscriber Name Subscriber Birth Date Member ID       MELLISA VELASQUEZ 12/28/1930 [id number]           Secondary Coverage     Payor Plan Insurance Group Employer/Plan Group     FOR LIFE  FOR LIFE  SUPP      Payor Plan Address Payor Plan Phone Number Payor Plan Fax Number Effective Dates    [address] [phone]  6/7/2017 - None Entered    [facility] [address]       Subscriber Name Subscriber Birth Date Member ID       MELLISA VELASQUEZ 12/28/1930 [id number]                 Emergency Contacts      (Rel.) Home Phone Work Phone Mobile Phone    Vane Chino (Daughter) [phone] -- [phone]              "

## 2019-03-15 NOTE — PROGRESS NOTES
Case Management Discharge Note    Final Note: Home with daughter with Senthil  following.  Transported by private auto by family.     Destination      No service has been selected for the patient.      Durable Medical Equipment      No service has been selected for the patient.      Dialysis/Infusion      No service has been selected for the patient.      Home Medical Care - Selection Complete      Service Provider Request Status Selected Services Address Phone Number Fax Number    [facility] [address], [address], [facility] [address] [phone]       Luis Luong RN 3/15/2019 1136    Current patient with [facility]      No service has been selected for the patient.        Transportation Services  Private: Car    Final Discharge Disposition Code: 06 - home with home health care

## 2019-03-16 NOTE — DISCHARGE SUMMARY
Date of Admission: 3/14/2019  Date of Discharge:  3/15/2019    PCP: Peter Azevedo MD      DISCHARGE DIAGNOSIS    Head injury, closed, without LOC    Persistent atrial fibrillation (CMS/HCC)    Mixed hyperlipidemia    Mitral valve insufficiency    Supratherapeutic INR      SECONDARY DIAGNOSES  Past Medical History:   Diagnosis Date   • Arrhythmia    • Atherosclerosis of native coronary artery of native heart without angina pectoris    • Atrial fibrillation by electrocardiogram (CMS/HCC)     neg stress 4/10   • Fatigue    • Hyperlipidemia, mixed    • Hypertension, benign    • Mitral valve disorder     moderate MR per 8/12 echo - worsening c/w 2010 - prob component of her dyspnea   • Sick sinus syndrome (CMS/[facility])    • Status post placement of cardiac pacemaker        CONSULTS   Consults     Date and Time Order Name Status Description    3/14/2019 1941 Inpatient Neurosurgery Consult Completed     3/14/2019 1545 [facility] (on-call MD unless specified) Completed     3/14/2019 1513 Neurosurgery (on-call MD unless specified) Completed           PROCEDURES PERFORMED  CT Head Without Contrast   Final Result   No acute intracranial abnormality.                           This report was finalized on 3/17/2019 3:11 AM by Alf Armstrong M.D.          CT Head Without Contrast   Final Result   No acute intracranial process identified.           CT OF THE CERVICAL SPINE WITHOUT CONTRAST       Axial images were obtained through the cervical spine without contrast.   Sagittal and coronal reconstruction images were reviewed.       FINDINGS:  There is degenerative change of the C1-C2 articulation. There   is minimal anterolisthesis of C4 on C5. There is moderately severe C5-6   spondylosis.       No other subluxation is seen. No cervical spine fractures are seen.       IMPRESSION: Cervical degenerative disease as described. No fractures are   seen.       Radiation dose reduction techniques were utilized, including automated    exposure control and exposure modulation based on body size.       This report was finalized on 3/15/2019 4:15 PM by Dr. Willie Kimble M.D.          CT Cervical Spine Without Contrast   Final Result   No acute intracranial process identified.           CT OF THE CERVICAL SPINE WITHOUT CONTRAST       Axial images were obtained through the cervical spine without contrast.   Sagittal and coronal reconstruction images were reviewed.       FINDINGS:  There is degenerative change of the C1-C2 articulation. There   is minimal anterolisthesis of C4 on C5. There is moderately severe C5-6   spondylosis.       No other subluxation is seen. No cervical spine fractures are seen.       IMPRESSION: Cervical degenerative disease as described. No fractures are   seen.       Radiation dose reduction techniques were utilized, including automated   exposure control and exposure modulation based on body size.       This report was finalized on 3/15/2019 4:15 PM by Dr. Willie Kimble M.D.          XR Chest PA & Lateral   Final Result      XR Spine Thoracic 3 View   Final Result           1. Opacity posteriorly at the lung base, as described, further   evaluation is advised.   2. Evidence of age-indeterminate T12 compression fracture, correlate   with location of pain. No other evidence of thoracic compression   fracture is identified. For further evaluation, if indicated, CT can be   considered.       This report was finalized on 3/14/2019 2:47 PM by Dr. René Eastman M.D.                HOSPITAL COURSE  Patient is a 88 y.o. female presented to [facility] complaining of a fall.  Please see the admitting history and physical for further details.   Her INR was elevated on admission and so she was admitted to our service for evaluation.  Given the elevated INR neurosurgery had recommended a repeat CT scan in the morning.  Repeat CAT scan showed no bleeding or other concerns.  I recommended she stay off her  "warfarin until Sunday and have her INR rechecked and can resume anticoagulation at that time if her INR is less than 3.  I have recommended that they talk to her cardiology explicitly about continued use of the warfarin therapy.  She is obviously at risk for continued falls.  This may not be in her best best interest to continue this medication.      CONDITION ON DISCHARGE  Stable.      VITAL SIGNS  /82 (BP Location: Left arm, Patient Position: Lying)   Pulse 60   Temp 97.3 °F (36.3 °C) (Oral)   Resp 18   Ht 154.9 cm (61\")   Wt 57.2 kg (126 lb)   SpO2 97%   BMI 23.81 kg/m²   Objective:  General Appearance:  Comfortable.    Vital signs: (most recent): Blood pressure 137/82, pulse 60, temperature 97.3 °F (36.3 °C), temperature source Oral, resp. rate 18, height 154.9 cm (61\"), weight 57.2 kg (126 lb), SpO2 97 %.  Vital signs are normal.  No fever.    Output: Producing urine and producing stool.    HEENT: Normal HEENT exam.    Lungs:  Normal effort.  Breath sounds clear to auscultation.    Heart: Normal rate.  S1 normal and S2 normal.    Abdomen: Abdomen is soft.  Bowel sounds are normal.   There is no abdominal tenderness.  There is no epigastric area tenderness.                 DISCHARGE DISPOSITION   Home or Self Care      DISCHARGE MEDICATIONS     Discharge Medications      Changes to Medications      Instructions Start Date   warfarin 2.5 MG tablet  Commonly known as:  COUMADIN  What changed:    · how much to take  · how to take this  · when to take this  · These instructions start on 3/18/2019. If you are unsure what to do until then, ask your doctor or other care provider.   2.5 mg, Oral, Nightly, Take one tablet by mouth daily, or as directed by the [facility].   Start Date:  3/18/2019        Continue These Medications      Instructions Start Date   acetaminophen-codeine 300-30 MG per tablet  Commonly known as:  TYLENOL #3   1 tablet, Oral, Every 6 Hours PRN      aspirin 81 MG EC " tablet   81 mg, Oral, Daily      CALCIUM 600 PO   Oral      digoxin 125 MCG tablet  Commonly known as:  LANOXIN   125 mcg, Oral, Daily Digoxin      escitalopram 10 MG tablet  Commonly known as:  LEXAPRO   5 mg, Oral, Daily      furosemide 40 MG tablet  Commonly known as:  LASIX   40 mg, Oral, Daily, Take one and a half tablets for a total of 60mg      levothyroxine 50 MCG tablet  Commonly known as:  SYNTHROID, LEVOTHROID   75 mcg, Oral, Daily      omeprazole 40 MG capsule  Commonly known as:  priLOSEC   40 mg, Oral, Daily, 2 capsules daily      potassium chloride 20 MEQ packet  Commonly known as:  KLOR-CON   20 mEq, Oral, Daily      TOPROL  MG 24 hr tablet  Generic drug:  metoprolol succinate XL   TAKE 1 TABLET DAILY      ZOCOR 10 MG tablet  Generic drug:  simvastatin   10 mg, Oral, Nightly           Diet Instructions     Diet: Regular; Thin      Discharge Diet:  Regular    Fluid Consistency:  Thin         Activity Instructions     Activity as Tolerated          Future Appointments   Date Time Provider Department Center   4/24/2019 12:45 PM Ike Rodriguez MD [facility] None   10/15/2019  3:00 PM Neisha Tristan APRN [facility] None     Additional Instructions for the Follow-ups that You Need to Schedule     Discharge Follow-up with PCP   As directed       Currently Documented PCP:    Peter Azevedo MD    PCP Phone Number:    [phone]     Follow Up Details:  4 weeks         Discharge Follow-up with Specified Provider: Dr Rodriguez; 1 Month   As directed      To:  Dr Rodriguez    Follow Up:  1 Month    Follow Up Details:  need to discuss the risk benefit of AC            Contact information for follow-up providers     Peter Azevedo MD Follow up on 3/25/2019.    Specialty:  Family Medicine  Why:  4 weeks     Office scheduled appointment for March 25th 2019 at 1:40  Spoke with Layla    Contact information:  [address] [facility]  [address] [place] [address] [phone]                   Contact  information for after-discharge care     Home Medical Care     [facility] .    Service:  Home Health Services  Contact information:  [address], [address]  [facility] [address] [phone]                             TEST  RESULTS PENDING AT DISCHARGE         Óscar Leonard MD  [facility]  03/15/19  9:37 PM      Time: greater than 30 minutes.

## 2019-03-18 LAB — INR PPP: 2

## 2019-03-19 ENCOUNTER — ANTICOAGULATION VISIT (OUTPATIENT)
Dept: PHARMACY | Facility: HOSPITAL | Age: 84
End: 2019-03-19

## 2019-03-19 DIAGNOSIS — I48.19 PERSISTENT ATRIAL FIBRILLATION (HCC): ICD-10-CM

## 2019-03-19 NOTE — PROGRESS NOTES
Anticoagulation Clinic Progress Note    Anticoagulation Summary  As of 3/19/2019    INR goal:   2.0-3.0   TTR:   62.5 % (1.4 mo)   INR used for dosin.00 (3/18/2019)   Warfarin maintenance plan:   2.5 mg on Mon, Wed, Fri; 1.25 mg all other days   Weekly warfarin total:   12.5 mg   No change documented:   Sara Lao RPH   Plan last modified:   Josephine Gaitan RPH (3/13/2019)   Next INR check:   2019   Priority:   Maintenance   Target end date:   Indefinite    Indications    Persistent atrial fibrillation (CMS/HCC) [I48.1]             Anticoagulation Episode Summary     INR check location:       Preferred lab:       Send INR reminders to:    JUAN LEAL  POOL    Comments:   **[facility]**      Anticoagulation Care Providers     Provider Role Specialty Phone number    Ike Rodriguez MD Referring Cardiology [phone]          Clinic Interview:  No pertinent clinical findings have been reported.    INR History:  Anticoagulation Monitoring 3/6/2019 3/13/2019 3/19/2019   INR 2.80 3.80 2.00   INR Date 3/6/2019 3/13/2019 3/18/2019   INR Goal 2.0-3.0 2.0-3.0 2.0-3.0   Trend Same Down Same   Last Week Total 13.75 mg 13.75 mg 10 mg   Next Week Total 13.75 mg 10 mg 12.5 mg   Sun 2.5 mg 1.25 mg 1.25 mg   Mon 2.5 mg 2.5 mg 2.5 mg   Tue 1.25 mg 1.25 mg 1.25 mg   Wed 2.5 mg Hold (3/13) 2.5 mg   Thu 1.25 mg 1.25 mg 1.25 mg   Fri 2.5 mg 2.5 mg 2.5 mg   Sat 1.25 mg 1.25 mg 1.25 mg   Visit Report - - -   Some recent data might be hidden       Plan:  1. INR is therapeutic today- see above in Anticoagulation Summary.    Tala DANIEL Bethea to continue their warfarin regimen of 12.5mg/week.   2. Follow up in 1 week  3. Pt has agreed to only be called if INR out of range. They have been instructed to call if any changes in medications, doses, concerns, etc.     Sara Lao [facility]

## 2019-03-27 ENCOUNTER — ANTICOAGULATION VISIT (OUTPATIENT)
Dept: PHARMACY | Facility: HOSPITAL | Age: 84
End: 2019-03-27

## 2019-03-27 DIAGNOSIS — I48.19 PERSISTENT ATRIAL FIBRILLATION (HCC): ICD-10-CM

## 2019-03-27 LAB — INR PPP: 2

## 2019-03-27 NOTE — PROGRESS NOTES
Anticoagulation Clinic Progress Note    Anticoagulation Summary  As of 3/27/2019    INR goal:   2.0-3.0   TTR:   69.0 % (1.7 mo)   INR used for dosin.00 (3/27/2019)   Warfarin maintenance plan:   2.5 mg every Mon, Wed, Fri; 1.25 mg all other days   Weekly warfarin total:   12.5 mg   No change documented:   Maria Victoria Garcia   Plan last modified:   Josephine Gaitan RP (3/13/2019)   Next INR check:   4/10/2019   Priority:   Maintenance   Target end date:   Indefinite    Indications    Persistent atrial fibrillation (CMS/HCC) [I48.1]             Anticoagulation Episode Summary     INR check location:       Preferred lab:       Send INR reminders to:    JUAN [facility]ONTEM[facility]  POOL    Comments:   **[facility]**      Anticoagulation Care Providers     Provider Role Specialty Phone number    Ike Rodriguez MD Referring Cardiology [phone]          Clinic Interview:  No pertinent clinical findings have been reported.    INR History:  Anticoagulation Monitoring 3/13/2019 3/19/2019 3/27/2019   INR 3.80 2.00 2.00   INR Date 3/13/2019 3/18/2019 3/27/2019   INR Goal 2.0-3.0 2.0-3.0 2.0-3.0   Trend Down Same Same   Last Week Total 13.75 mg 10 mg 12.5 mg   Next Week Total 10 mg 12.5 mg 12.5 mg   Sun 1.25 mg 1.25 mg 1.25 mg   Mon 2.5 mg 2.5 mg 2.5 mg   Tue 1.25 mg 1.25 mg 1.25 mg   Wed Hold (3/13) 2.5 mg 2.5 mg   Thu 1.25 mg 1.25 mg 1.25 mg   Fri 2.5 mg 2.5 mg 2.5 mg   Sat 1.25 mg 1.25 mg 1.25 mg   Visit Report - - -   Some recent data might be hidden       Plan:  1. INR is therapeutic today- see above in Anticoagulation Summary.    Tala DANIEL Bethea to continue their warfarin regimen- see above in Anticoagulation Summary.  2. Follow up in 2 weeks  3. Pt has agreed to only be called if INR out of range. They have been instructed to call if any changes in medications, doses, concerns, etc. Patient expresses understanding and has no further questions at this time.    Maria Victoria Garcia

## 2019-04-03 LAB — INR PPP: 2.5

## 2019-04-04 ENCOUNTER — ANTICOAGULATION VISIT (OUTPATIENT)
Dept: PHARMACY | Facility: HOSPITAL | Age: 84
End: 2019-04-04

## 2019-04-04 DIAGNOSIS — I48.19 PERSISTENT ATRIAL FIBRILLATION (HCC): ICD-10-CM

## 2019-04-04 NOTE — PROGRESS NOTES
Anticoagulation Clinic Progress Note    Anticoagulation Summary  As of 2019    INR goal:   2.0-3.0   TTR:   72.7 % (2 mo)   INR used for dosin.50 (4/3/2019)   Warfarin maintenance plan:   2.5 mg every Mon, Wed, Fri; 1.25 mg all other days   Weekly warfarin total:   12.5 mg   No change documented:   Antonia Salcedo   Plan last modified:   Josephine Gaitan RPH (3/13/2019)   Next INR check:   2019   Priority:   Maintenance   Target end date:   Indefinite    Indications    Persistent atrial fibrillation (CMS/HCC) [I48.1]             Anticoagulation Episode Summary     INR check location:       Preferred lab:       Send INR reminders to:   [facility]  POOL    Comments:   **[facility]**      Anticoagulation Care Providers     Provider Role Specialty Phone number    Ike Rodriguez MD Referring Cardiology [phone]          Clinic Interview:  No pertinent clinical findings have been reported.    INR History:  Anticoagulation Monitoring 3/19/2019 3/27/2019 2019   INR 2.00 2.00 2.50   INR Date 3/18/2019 3/27/2019 4/3/2019   INR Goal 2.0-3.0 2.0-3.0 2.0-3.0   Trend Same Same Same   Last Week Total 10 mg 12.5 mg 12.5 mg   Next Week Total 12.5 mg 12.5 mg 12.5 mg   Sun 1.25 mg 1.25 mg 1.25 mg   Mon 2.5 mg 2.5 mg 2.5 mg   Tue 1.25 mg 1.25 mg 1.25 mg   Wed 2.5 mg 2.5 mg 2.5 mg   Thu 1.25 mg 1.25 mg 1.25 mg   Fri 2.5 mg 2.5 mg 2.5 mg   Sat 1.25 mg 1.25 mg 1.25 mg   Visit Report - - -   Some recent data might be hidden       Plan:  1. INR is therapeutic today- see above in Anticoagulation Summary.    Tala DANIEL Bethea to continue their warfarin regimen- see above in Anticoagulation Summary.  2. Follow up in 2 weeks  3. Pt has agreed to only be called if INR out of range. They have been instructed to call if any changes in medications, doses, concerns, etc. Patient expresses understanding and has no further questions at this time.    Antonia Salcedo

## 2019-04-09 ENCOUNTER — ANTICOAGULATION VISIT (OUTPATIENT)
Dept: PHARMACY | Facility: HOSPITAL | Age: 84
End: 2019-04-09

## 2019-04-09 DIAGNOSIS — I48.19 PERSISTENT ATRIAL FIBRILLATION (HCC): ICD-10-CM

## 2019-04-09 LAB — INR PPP: 2.8

## 2019-04-09 NOTE — PROGRESS NOTES
Anticoagulation Clinic Progress Note    Anticoagulation Summary  As of 2019    INR goal:   2.0-3.0   TTR:   75.2 % (2.2 mo)   INR used for dosin.80 (2019)   Warfarin maintenance plan:   2.5 mg every Mon, Wed, Fri; 1.25 mg all other days   Weekly warfarin total:   12.5 mg   No change documented:   Maria Victoria Garcia   Plan last modified:   Josephine Gaitan RPH (3/13/2019)   Next INR check:   2019   Priority:   Maintenance   Target end date:   Indefinite    Indications    Persistent atrial fibrillation (CMS/HCC) [I48.1]             Anticoagulation Episode Summary     INR check location:       Preferred lab:       Send INR reminders to:    [facility]  POOL    Comments:   **[facility]**      Anticoagulation Care Providers     Provider Role Specialty Phone number    Ike Rodriguez MD Referring Cardiology [phone]          Clinic Interview:  No pertinent clinical findings have been reported.    INR History:  Anticoagulation Monitoring 3/27/2019 2019 2019   INR 2.00 2.50 2.80   INR Date 3/27/2019 4/3/2019 2019   INR Goal 2.0-3.0 2.0-3.0 2.0-3.0   Trend Same Same Same   Last Week Total 12.5 mg 12.5 mg 12.5 mg   Next Week Total 12.5 mg 12.5 mg 12.5 mg   Sun 1.25 mg 1.25 mg 1.25 mg   Mon 2.5 mg 2.5 mg 2.5 mg   Tue 1.25 mg 1.25 mg 1.25 mg   Wed 2.5 mg 2.5 mg 2.5 mg   Thu 1.25 mg 1.25 mg 1.25 mg   Fri 2.5 mg 2.5 mg 2.5 mg   Sat 1.25 mg 1.25 mg 1.25 mg   Visit Report - - -   Some recent data might be hidden       Plan:  1. INR is therapeutic today- see above in Anticoagulation Summary.    Tala DANIEL Bethea to continue their warfarin regimen- see above in Anticoagulation Summary.  2. Follow up in 2 weeks  3. Pt has agreed to only be called if INR out of range. They have been instructed to call if any changes in medications, doses, concerns, etc. Patient expresses understanding and has no further questions at this time.    Maria Victoria Garcia

## 2019-04-17 ENCOUNTER — ANTICOAGULATION VISIT (OUTPATIENT)
Dept: PHARMACY | Facility: HOSPITAL | Age: 84
End: 2019-04-17

## 2019-04-17 DIAGNOSIS — I48.19 PERSISTENT ATRIAL FIBRILLATION (HCC): ICD-10-CM

## 2019-04-17 LAB — INR PPP: 3.1

## 2019-04-17 RX ORDER — WARFARIN SODIUM 2.5 MG/1
TABLET ORAL
Qty: 90 TABLET | Refills: 0 | Status: SHIPPED | OUTPATIENT
Start: 2019-04-17

## 2019-04-17 NOTE — PROGRESS NOTES
Anticoagulation Clinic Progress Note    Anticoagulation Summary  As of 4/17/2019    INR goal:   2.0-3.0   TTR:   74.3 % (2.4 mo)   INR used for dosing:   3.10! (4/17/2019)   Warfarin maintenance plan:   2.5 mg every day   Weekly warfarin total:   17.5 mg   Plan last modified:   Hugo Hines RPH (4/17/2019)   Next INR check:   4/24/2019   Priority:   Maintenance   Target end date:   Indefinite    Indications    Persistent atrial fibrillation (CMS/HCC) [I48.1]             Anticoagulation Episode Summary     INR check location:       Preferred lab:       Send INR reminders to:    JUAN LEAL  POOL    Comments:   **[facility]**      Anticoagulation Care Providers     Provider Role Specialty Phone number    Ike Rodriguez MD Referring Cardiology [phone]            Clinic Interview:  Patient Findings     Positives:   Other complaints    Negatives:   Signs/symptoms of thrombosis, Signs/symptoms of bleeding,   Laboratory test error suspected, Change in health, Change in alcohol use,   Change in activity, Upcoming invasive procedure, Emergency department   visit, Upcoming dental procedure, Missed doses, Extra doses, Change in   medications, Change in diet/appetite, Hospital admission, Bruising    Comments:   Possibly missed dose, but unlikely. Reports 2.5 mg daily   rather than 2.5 mg MWF, 1.25 mg rest of wk; repeatedly confirms that 2.5   mg daily has been taken at least the past 1-2 months.      Clinical Outcomes     Negatives:   Major bleeding event, Thromboembolic event,   Anticoagulation-related hospital admission, Anticoagulation-related ED   visit, Anticoagulation-related fatality    Comments:   Possibly missed dose, but unlikely. Reports 2.5 mg daily   rather than 2.5 mg MWF, 1.25 mg rest of wk; repeatedly confirms that 2.5   mg daily has been taken at least the past 1-2 months.        INR History:  Anticoagulation Monitoring 4/4/2019 4/9/2019 4/17/2019   INR 2.50 2.80 3.10   INR Date  4/3/2019 4/9/2019 4/17/2019   INR Goal 2.0-3.0 2.0-3.0 2.0-3.0   Trend Same Same Up   Last Week Total 12.5 mg 12.5 mg 17.5 mg   Next Week Total 12.5 mg 12.5 mg 17.5 mg   Sun 1.25 mg 1.25 mg 2.5 mg   Mon 2.5 mg 2.5 mg 2.5 mg   Tue 1.25 mg 1.25 mg 2.5 mg   Wed 2.5 mg 2.5 mg 2.5 mg   Thu 1.25 mg 1.25 mg 2.5 mg   Fri 2.5 mg 2.5 mg 2.5 mg   Sat 1.25 mg 1.25 mg 2.5 mg   Visit Report - - -   Some recent data might be hidden       Plan:  1. INR is Supratherapeutic today- see above in Anticoagulation Summary.   Will instruct Tala Bethea to Continue their warfarin regimen as reported today (2.5 mg daily x 1-2 mos per caregiver/daughter) - see above in Anticoagulation Summary.  2. Follow up in 1 week  3. Pt has agreed to only be called if INR out of range. They have been instructed to call if any changes in medications, doses, concerns, etc. Patient expresses understanding and has no further questions at this time.    Hugo Hines [facility]

## 2019-04-27 LAB — INR PPP: 2.5

## 2019-04-29 ENCOUNTER — ANTICOAGULATION VISIT (OUTPATIENT)
Dept: PHARMACY | Facility: HOSPITAL | Age: 84
End: 2019-04-29

## 2019-04-29 DIAGNOSIS — I48.19 PERSISTENT ATRIAL FIBRILLATION (HCC): ICD-10-CM

## 2019-04-29 NOTE — PROGRESS NOTES
Anticoagulation Clinic Progress Note    Anticoagulation Summary  As of 2019    INR goal:   2.0-3.0   TTR:   75.3 % (2.8 mo)   INR used for dosin.50 (2019)   Warfarin maintenance plan:   2.5 mg every day   Weekly warfarin total:   17.5 mg   No change documented:   Hugo Hines RPH   Plan last modified:   Hugo Hines RPH (2019)   Next INR check:   2019   Priority:   Maintenance   Target end date:   Indefinite    Indications    Persistent atrial fibrillation (CMS/HCC) [I48.1]             Anticoagulation Episode Summary     INR check location:       Preferred lab:       [facility]    Comments:   [facility]      Anticoagulation Care Providers     Provider Role Specialty Phone number    Ike Rodriguez MD Referring Cardiology [phone]          Clinic Interview:  No pertinent clinical findings have been reported.    INR History:  Anticoagulation Monitoring 2019   INR 2.80 3.10 2.50   INR Date 2019   INR Goal 2.0-3.0 2.0-3.0 2.0-3.0   Trend Same Up Same   Last Week Total 12.5 mg 17.5 mg 17.5 mg   Next Week Total 12.5 mg 17.5 mg 17.5 mg   Sun 1.25 mg 2.5 mg 2.5 mg   Mon 2.5 mg 2.5 mg 2.5 mg   Tue 1.25 mg 2.5 mg 2.5 mg   Wed 2.5 mg 2.5 mg 2.5 mg   Thu 1.25 mg 2.5 mg 2.5 mg   Fri 2.5 mg 2.5 mg 2.5 mg   Sat 1.25 mg 2.5 mg 2.5 mg   Visit Report - - -   Some recent data might be hidden       Plan:  1. INR is therapeutic today- see above in Anticoagulation Summary.    Tala DANIEL Bethea to continue their warfarin regimen- see above in Anticoagulation Summary.  2. Follow up in 2 weeks  3. Pt has agreed to only be called if INR out of range. They have been instructed to call if any changes in medications, doses, concerns, etc. Patient expresses understanding and has no further questions at this time.    Hugo Hines RPH

## 2019-05-01 LAB — INR PPP: 2.7

## 2019-05-02 ENCOUNTER — ANTICOAGULATION VISIT (OUTPATIENT)
Dept: PHARMACY | Facility: HOSPITAL | Age: 84
End: 2019-05-02

## 2019-05-02 DIAGNOSIS — I48.19 PERSISTENT ATRIAL FIBRILLATION (HCC): ICD-10-CM

## 2019-05-02 NOTE — PROGRESS NOTES
Anticoagulation Clinic Progress Note    Anticoagulation Summary  As of 2019    INR goal:   2.0-3.0   TTR:   76.5 % (2.9 mo)   INR used for dosin.70 (2019)   Warfarin maintenance plan:   2.5 mg every day   Weekly warfarin total:   17.5 mg   No change documented:   Antonia Salcedo   Plan last modified:   Hugo Hines [facility] (2019)   Next INR check:   5/15/2019   Priority:   Maintenance   Target end date:   Indefinite    Indications    Persistent atrial fibrillation (CMS/HCC) [I48.1]             Anticoagulation Episode Summary     INR check location:       Preferred lab:       [facility]    Comments:   **[facility]**      Anticoagulation Care Providers     Provider Role Specialty Phone number    Ike Rodriguez MD Referring Cardiology [phone]          Clinic Interview:  No pertinent clinical findings have been reported.    INR History:  Anticoagulation Monitoring 2019   INR 3.10 2.50 2.70   INR Date 2019   INR Goal 2.0-3.0 2.0-3.0 2.0-3.0   Trend Up Same Same   Last Week Total 17.5 mg 17.5 mg 17.5 mg   Next Week Total 17.5 mg 17.5 mg 17.5 mg   Sun 2.5 mg 2.5 mg 2.5 mg   Mon 2.5 mg 2.5 mg 2.5 mg   Tue 2.5 mg 2.5 mg 2.5 mg   Wed 2.5 mg 2.5 mg 2.5 mg   Thu 2.5 mg 2.5 mg 2.5 mg   Fri 2.5 mg 2.5 mg 2.5 mg   Sat 2.5 mg 2.5 mg 2.5 mg   Visit Report - - -   Some recent data might be hidden       Plan:  1. INR is therapeutic today- see above in Anticoagulation Summary.    Tala DANIEL Bethea to continue their warfarin regimen- see above in Anticoagulation Summary.  2. Follow up in 2 weeks  3. Pt has agreed to only be called if INR out of range. They have been instructed to call if any changes in medications, doses, concerns, etc. Patient expresses understanding and has no further questions at this time.    Antonia Salcedo

## 2019-05-15 LAB — INR PPP: 2.5

## 2019-05-16 ENCOUNTER — ANTICOAGULATION VISIT (OUTPATIENT)
Dept: PHARMACY | Facility: HOSPITAL | Age: 84
End: 2019-05-16

## 2019-05-16 DIAGNOSIS — I48.19 PERSISTENT ATRIAL FIBRILLATION (HCC): ICD-10-CM

## 2019-05-16 NOTE — PROGRESS NOTES
Anticoagulation Clinic Progress Note    Anticoagulation Summary  As of 2019    INR goal:   2.0-3.0   TTR:   79.7 % (3.4 mo)   INR used for dosin.50 (5/15/2019)   Warfarin maintenance plan:   2.5 mg every day   Weekly warfarin total:   17.5 mg   No change documented:   Maria Victoria Garcia   Plan last modified:   Hugo Hines [facility] (2019)   Next INR check:   2019   Priority:   Maintenance   Target end date:   Indefinite    Indications    Persistent atrial fibrillation (CMS/HCC) [I48.1]             Anticoagulation Episode Summary     INR check location:       Preferred lab:       Send INR reminders to:   [facility]  POOL    Comments:   **[facility]**      Anticoagulation Care Providers     Provider Role Specialty Phone number    Ike Rodriguez MD Referring Cardiology [phone]          Clinic Interview:  No pertinent clinical findings have been reported.    INR History:  Anticoagulation Monitoring 2019   INR 2.50 2.70 2.50   INR Date 2019 2019 5/15/2019   INR Goal 2.0-3.0 2.0-3.0 2.0-3.0   Trend Same Same Same   Last Week Total 17.5 mg 17.5 mg 17.5 mg   Next Week Total 17.5 mg 17.5 mg 17.5 mg   Sun 2.5 mg 2.5 mg 2.5 mg   Mon 2.5 mg 2.5 mg 2.5 mg   Tue 2.5 mg 2.5 mg 2.5 mg   Wed 2.5 mg 2.5 mg 2.5 mg   Thu 2.5 mg 2.5 mg 2.5 mg   Fri 2.5 mg 2.5 mg 2.5 mg   Sat 2.5 mg 2.5 mg 2.5 mg   Visit Report - - -   Some recent data might be hidden       Plan:  1. INR is therapeutic today- see above in Anticoagulation Summary.    Tala DANIEL Bethea to continue their warfarin regimen- see above in Anticoagulation Summary.  2. Follow up in 2 weeks  3. Pt has agreed to only be called if INR out of range. They have been instructed to call if any changes in medications, doses, concerns, etc. Patient expresses understanding and has no further questions at this time.    Maria Victoria Garcia

## 2019-05-16 NOTE — PROGRESS NOTES
Anticoagulation Clinic Progress Note    Anticoagulation Summary  As of 2019    INR goal:   2.0-3.0   TTR:   79.7 % (3.4 mo)   INR used for dosin.50 (5/15/2019)   Warfarin maintenance plan:   2.5 mg every day   Weekly warfarin total:   17.5 mg   No change documented:   Maria Victoria Garcia   Plan last modified:   Hugo Hines [facility] (2019)   Next INR check:   2019   Priority:   Maintenance   Target end date:   Indefinite    Indications    Persistent atrial fibrillation (CMS/HCC) [I48.1]             Anticoagulation Episode Summary     INR check location:       Preferred lab:       Send INR reminders to:   [facility]  POOL    Comments:   **[facility]**      Anticoagulation Care Providers     Provider Role Specialty Phone number    Ike Rodriguez MD Referring Cardiology [phone]          Clinic Interview:  No pertinent clinical findings have been reported.    INR History:  Anticoagulation Monitoring 2019   INR 2.50 2.70 2.50   INR Date 2019 2019 5/15/2019   INR Goal 2.0-3.0 2.0-3.0 2.0-3.0   Trend Same Same Same   Last Week Total 17.5 mg 17.5 mg 17.5 mg   Next Week Total 17.5 mg 17.5 mg 17.5 mg   Sun 2.5 mg 2.5 mg 2.5 mg   Mon 2.5 mg 2.5 mg 2.5 mg   Tue 2.5 mg 2.5 mg 2.5 mg   Wed 2.5 mg 2.5 mg 2.5 mg   Thu 2.5 mg 2.5 mg 2.5 mg   Fri 2.5 mg 2.5 mg 2.5 mg   Sat 2.5 mg 2.5 mg 2.5 mg   Visit Report - - -   Some recent data might be hidden       Plan:  1. INR is therapeutic today- see above in Anticoagulation Summary.    Tala DANIEL Bethea to continue their warfarin regimen- see above in Anticoagulation Summary.  2. Follow up in 1 week  3. Pt has agreed to only be called if INR out of range. They have been instructed to call if any changes in medications, doses, concerns, etc. Patient expresses understanding and has no further questions at this time.    Maria Victoria Garcia

## 2019-05-29 LAB — INR PPP: 2

## 2019-05-30 ENCOUNTER — ANTICOAGULATION VISIT (OUTPATIENT)
Dept: PHARMACY | Facility: HOSPITAL | Age: 84
End: 2019-05-30

## 2019-05-30 DIAGNOSIS — I48.19 PERSISTENT ATRIAL FIBRILLATION (HCC): ICD-10-CM

## 2019-05-30 NOTE — PROGRESS NOTES
Anticoagulation Clinic Progress Note    Anticoagulation Summary  As of 2019    INR goal:   2.0-3.0   TTR:   82.2 % (3.8 mo)   INR used for dosin.00 (2019)   Warfarin maintenance plan:   2.5 mg every day   Weekly warfarin total:   17.5 mg   No change documented:   Maria Victoria Garcia   Plan last modified:   Hugo Hines [facility] (2019)   Next INR check:   2019   Priority:   Maintenance   Target end date:   Indefinite    Indications    Persistent atrial fibrillation (CMS/HCC) [I48.1]             Anticoagulation Episode Summary     INR check location:       Preferred lab:       Send INR reminders to:   [facility]  POOL    Comments:   **[facility]**      Anticoagulation Care Providers     Provider Role Specialty Phone number    Ike Rodriguez MD Referring Cardiology [phone]          Clinic Interview:  No pertinent clinical findings have been reported.    INR History:  Anticoagulation Monitoring 2019   INR 2.70 2.50 2.00   INR Date 2019 5/15/2019 2019   INR Goal 2.0-3.0 2.0-3.0 2.0-3.0   Trend Same Same Same   Last Week Total 17.5 mg 17.5 mg 17.5 mg   Next Week Total 17.5 mg 17.5 mg 17.5 mg   Sun 2.5 mg 2.5 mg 2.5 mg   Mon 2.5 mg 2.5 mg 2.5 mg   Tue 2.5 mg 2.5 mg 2.5 mg   Wed 2.5 mg 2.5 mg 2.5 mg   Thu 2.5 mg 2.5 mg 2.5 mg   Fri 2.5 mg 2.5 mg 2.5 mg   Sat 2.5 mg 2.5 mg 2.5 mg   Visit Report - - -   Some recent data might be hidden       Plan:  1. INR is therapeutic today- see above in Anticoagulation Summary.    Tala DANIEL Bethea to continue their warfarin regimen- see above in Anticoagulation Summary.  2. Follow up in 2 weeks  3. Pt has agreed to only be called if INR out of range. They have been instructed to call if any changes in medications, doses, concerns, etc. Patient expresses understanding and has no further questions at this time.    Maria Victoria Garcia

## 2019-06-13 LAB — INR PPP: 3

## 2019-06-17 ENCOUNTER — ANTICOAGULATION VISIT (OUTPATIENT)
Dept: PHARMACY | Facility: HOSPITAL | Age: 84
End: 2019-06-17

## 2019-06-17 DIAGNOSIS — I48.19 PERSISTENT ATRIAL FIBRILLATION (HCC): ICD-10-CM

## 2019-06-17 NOTE — PROGRESS NOTES
Anticoagulation Clinic Progress Note    Anticoagulation Summary  As of 6/17/2019    INR goal:   2.0-3.0   TTR:   84.3 % (4.3 mo)   INR used for dosing:   3.00 (6/13/2019)   Warfarin maintenance plan:   2.5 mg every day   Weekly warfarin total:   17.5 mg   No change documented:   Antonia Salcedo   Plan last modified:   Hugo Hines [facility] (4/17/2019)   Next INR check:   6/27/2019   Priority:   Maintenance   Target end date:   Indefinite    Indications    Persistent atrial fibrillation (CMS/HCC) [I48.1]             Anticoagulation Episode Summary     INR check location:       Preferred lab:       Send INR reminders to:    [facility]  POOL    Comments:   **[facility]      Anticoagulation Care Providers     Provider Role Specialty Phone number    Ike Rodriguez MD Referring Cardiology [phone]          Clinic Interview:  No pertinent clinical findings have been reported.    INR History:  Anticoagulation Monitoring 5/16/2019 5/30/2019 6/17/2019   INR 2.50 2.00 3.00   INR Date 5/15/2019 5/29/2019 6/13/2019   INR Goal 2.0-3.0 2.0-3.0 2.0-3.0   Trend Same Same Same   Last Week Total 17.5 mg 17.5 mg 17.5 mg   Next Week Total 17.5 mg 17.5 mg 17.5 mg   Sun 2.5 mg 2.5 mg 2.5 mg   Mon 2.5 mg 2.5 mg 2.5 mg   Tue 2.5 mg 2.5 mg 2.5 mg   Wed 2.5 mg 2.5 mg 2.5 mg   Thu 2.5 mg 2.5 mg 2.5 mg   Fri 2.5 mg 2.5 mg 2.5 mg   Sat 2.5 mg 2.5 mg 2.5 mg   Visit Report - - -   Some recent data might be hidden       Plan:  1. INR is therapeutic today- see above in Anticoagulation Summary.    Tala DANIEL Bethea to continue their warfarin regimen- see above in Anticoagulation Summary.  2. Follow up in 2 weeks  3. Pt has agreed to only be called if INR out of range. They have been instructed to call if any changes in medications, doses, concerns, etc. Patient expresses understanding and has no further questions at this time.    Antonia Salcedo

## 2019-06-25 ENCOUNTER — CLINICAL SUPPORT NO REQUIREMENTS (OUTPATIENT)
Dept: CARDIOLOGY | Facility: CLINIC | Age: 84
End: 2019-06-25

## 2019-06-25 DIAGNOSIS — I44.2 COMPLETE HEART BLOCK (HCC): Primary | ICD-10-CM

## 2019-06-25 PROCEDURE — 93294 REM INTERROG EVL PM/LDLS PM: CPT | Performed by: INTERNAL MEDICINE

## 2019-06-25 PROCEDURE — 93296 REM INTERROG EVL PM/IDS: CPT | Performed by: INTERNAL MEDICINE

## 2019-06-27 ENCOUNTER — ANTICOAGULATION VISIT (OUTPATIENT)
Dept: PHARMACY | Facility: HOSPITAL | Age: 84
End: 2019-06-27

## 2019-06-27 DIAGNOSIS — I48.19 PERSISTENT ATRIAL FIBRILLATION (HCC): ICD-10-CM

## 2019-06-27 LAB — INR PPP: 3.2

## 2019-06-27 NOTE — PROGRESS NOTES
Anticoagulation Clinic Progress Note    Anticoagulation Summary  As of 6/27/2019    INR goal:   2.0-3.0   TTR:   76.6 % (4.8 mo)   INR used for dosing:   3.20! (6/26/2019)   Warfarin maintenance plan:   2.5 mg every day   Weekly warfarin total:   17.5 mg   No change documented:   Cleo Mccord   Plan last modified:   Hugo Hines [facility] (4/17/2019)   Next INR check:   7/10/2019   Priority:   Maintenance   Target end date:   Indefinite    Indications    Persistent atrial fibrillation (CMS/HCC) [I48.1]             Anticoagulation Episode Summary     INR check location:       Preferred lab:       [facility]    Comments:   [facility]      Anticoagulation Care Providers     Provider Role Specialty Phone number    Ike Rodriguez MD Referring Cardiology [phone]            Clinic Interview:  Patient Findings     Positives:   Change in medications    Negatives:   Signs/symptoms of thrombosis, Signs/symptoms of bleeding,   Laboratory test error suspected, Change in health, Change in alcohol use,   Change in activity, Upcoming invasive procedure, Emergency department   visit, Upcoming dental procedure, Missed doses, Extra doses, Change in   diet/appetite, Hospital admission, Bruising, Other complaints    Comments:   Appears escitalopram and levothyroxine were recently   increased 5/28/19 per review of med list.      Clinical Outcomes     Negatives:   Major bleeding event, Thromboembolic event,   Anticoagulation-related hospital admission, Anticoagulation-related ED   visit, Anticoagulation-related fatality    Comments:   Appears escitalopram and levothyroxine were recently   increased 5/28/19 per review of med list.        INR History:  Anticoagulation Monitoring 5/30/2019 6/17/2019 6/27/2019   INR 2.00 3.00 3.20   INR Date 5/29/2019 6/13/2019 6/26/2019   INR Goal 2.0-3.0 2.0-3.0 2.0-3.0   Trend Same Same Same   Last Week Total 17.5 mg 17.5 mg 17.5 mg   Next Week  Total 17.5 mg 17.5 mg 17.5 mg   Sun 2.5 mg 2.5 mg 2.5 mg   Mon 2.5 mg 2.5 mg 2.5 mg   Tue 2.5 mg 2.5 mg 2.5 mg   Wed 2.5 mg 2.5 mg 2.5 mg   Thu 2.5 mg 2.5 mg 2.5 mg   Fri 2.5 mg 2.5 mg 2.5 mg   Sat 2.5 mg 2.5 mg 2.5 mg   Visit Report - - -   Some recent data might be hidden       Plan:  1. INR is Supratherapeutic today- see above in Anticoagulation Summary.   Will instruct Tala Bethea to Continue their warfarin regimen- see above in Anticoagulation Summary.  2. Follow up in 2 weeks  3. Pt has agreed to only be called if INR out of range. They have been instructed to call if any changes in medications, doses, concerns, etc. Patient expresses understanding and has no further questions at this time. Unable to reach. Left voicemail with instructions per previous permission; invited to contact clinic with any questions or concerns.     Hugo Hines RP

## 2019-07-10 RX ORDER — POTASSIUM CHLORIDE 1500 MG/1
TABLET, EXTENDED RELEASE ORAL
Qty: 30 TABLET | Refills: 5 | Status: SHIPPED | OUTPATIENT
Start: 2019-07-10 | End: 2020-01-07

## 2019-07-18 LAB — INR PPP: 2.4

## 2019-07-19 ENCOUNTER — ANTICOAGULATION VISIT (OUTPATIENT)
Dept: PHARMACY | Facility: HOSPITAL | Age: 84
End: 2019-07-19

## 2019-07-19 DIAGNOSIS — I48.19 PERSISTENT ATRIAL FIBRILLATION (HCC): ICD-10-CM

## 2019-07-19 NOTE — PROGRESS NOTES
Anticoagulation Clinic Progress Note    Anticoagulation Summary  As of 2019    INR goal:   2.0-3.0   TTR:   76.4 % (5.5 mo)   INR used for dosin.40 (2019)   Warfarin maintenance plan:   2.5 mg every day   Weekly warfarin total:   17.5 mg   No change documented:   Hugo Hines RPH   Plan last modified:   Hugo Hines RPH (2019)   Next INR check:   2019   Priority:   Maintenance   Target end date:   Indefinite    Indications    Persistent atrial fibrillation (CMS/HCC) [I48.1]             Anticoagulation Episode Summary     INR check location:       Preferred lab:       [facility]    Comments:   [facility]      Anticoagulation Care Providers     Provider Role Specialty Phone number    Ike Rodriguez MD Referring Cardiology [phone]          Clinic Interview:  No pertinent clinical findings have been reported.    INR History:  Anticoagulation Monitoring 2019   INR 3.00 3.20 2.40   INR Date 2019   INR Goal 2.0-3.0 2.0-3.0 2.0-3.0   Trend Same Same Same   Last Week Total 17.5 mg 17.5 mg 17.5 mg   Next Week Total 17.5 mg 17.5 mg 17.5 mg   Sun 2.5 mg 2.5 mg 2.5 mg   Mon 2.5 mg 2.5 mg 2.5 mg   Tue 2.5 mg 2.5 mg 2.5 mg   Wed 2.5 mg 2.5 mg 2.5 mg   Thu 2.5 mg 2.5 mg 2.5 mg   Fri 2.5 mg 2.5 mg 2.5 mg   Sat 2.5 mg 2.5 mg 2.5 mg   Visit Report - - -   Some recent data might be hidden       Plan:  1. INR is therapeutic today- see above in Anticoagulation Summary.    Tala DANIEL Bethea to continue their warfarin regimen- see above in Anticoagulation Summary.  2. Follow up in 2 weeks  3. Pt has agreed to only be called if INR out of range. They have been instructed to call if any changes in medications, doses, concerns, etc. Patient expresses understanding and has no further questions at this time.    Hugo Hines RPH

## 2019-07-25 LAB — INR PPP: 2.5

## 2019-07-26 ENCOUNTER — ANTICOAGULATION VISIT (OUTPATIENT)
Dept: PHARMACY | Facility: HOSPITAL | Age: 84
End: 2019-07-26

## 2019-07-26 DIAGNOSIS — I48.19 PERSISTENT ATRIAL FIBRILLATION (HCC): ICD-10-CM

## 2019-07-26 LAB — INR PPP: 2.5

## 2019-07-26 NOTE — PROGRESS NOTES
Anticoagulation Clinic Progress Note    Anticoagulation Summary  As of 2019    INR goal:   2.0-3.0   TTR:   77.3 % (5.7 mo)   INR used for dosin.50 (2019)   Warfarin maintenance plan:   2.5 mg every day   Weekly warfarin total:   17.5 mg   No change documented:   Cleo Mccord   Plan last modified:   Hugo Hines [facility] (2019)   Next INR check:   2019   Priority:   Maintenance   Target end date:   Indefinite    Indications    Persistent atrial fibrillation (CMS/HCC) [I48.1]             Anticoagulation Episode Summary     INR check location:       Preferred lab:       Send INR reminders to:   [facility]  POOL    Comments:   **[facility]      Anticoagulation Care Providers     Provider Role Specialty Phone number    Ike Rodriguez MD Referring Cardiology [phone]          Clinic Interview:  No pertinent clinical findings have been reported.    INR History:  Anticoagulation Monitoring 2019   INR 2.40 2.50 2.50   INR Date 2019   INR Goal 2.0-3.0 2.0-3.0 2.0-3.0   Trend Same Same Same   Last Week Total 17.5 mg 17.5 mg 17.5 mg   Next Week Total 17.5 mg 17.5 mg 17.5 mg   Sun 2.5 mg 2.5 mg 2.5 mg   Mon 2.5 mg 2.5 mg 2.5 mg   Tue 2.5 mg 2.5 mg 2.5 mg   Wed 2.5 mg 2.5 mg 2.5 mg   Thu 2.5 mg 2.5 mg 2.5 mg   Fri 2.5 mg 2.5 mg 2.5 mg   Sat 2.5 mg 2.5 mg 2.5 mg   Visit Report - - -   Some recent data might be hidden       Plan:  1. INR is therapeutic today- see above in Anticoagulation Summary.    Tala DANIEL Bethea to continue their warfarin regimen- see above in Anticoagulation Summary.  2. Follow up in 2 weeks  3. Pt has agreed to only be called if INR out of range. They have been instructed to call if any changes in medications, doses, concerns, etc. Patient expresses understanding and has no further questions at this time.    Cleo Mccord

## 2019-07-26 NOTE — PROGRESS NOTES
Anticoagulation Clinic Progress Note    Anticoagulation Summary  As of 2019    INR goal:   2.0-3.0   TTR:   77.3 % (5.7 mo)   INR used for dosin.50 (2019)   Warfarin maintenance plan:   2.5 mg every day   Weekly warfarin total:   17.5 mg   No change documented:   Cleo Mccord   Plan last modified:   Hugo Hines [facility] (2019)   Next INR check:   2019   Priority:   Maintenance   Target end date:   Indefinite    Indications    Persistent atrial fibrillation (CMS/HCC) [I48.1]             Anticoagulation Episode Summary     INR check location:       Preferred lab:       Send INR reminders to:   [facility]  POOL    Comments:   **[facility]      Anticoagulation Care Providers     Provider Role Specialty Phone number    Ike Rodriguez MD Referring Cardiology [phone]          Clinic Interview:  No pertinent clinical findings have been reported.    INR History:  Anticoagulation Monitoring 2019   INR 2.40 2.50 2.50   INR Date 2019   INR Goal 2.0-3.0 2.0-3.0 2.0-3.0   Trend Same Same Same   Last Week Total 17.5 mg 17.5 mg 17.5 mg   Next Week Total 17.5 mg 17.5 mg 17.5 mg   Sun 2.5 mg 2.5 mg 2.5 mg   Mon 2.5 mg 2.5 mg 2.5 mg   Tue 2.5 mg 2.5 mg 2.5 mg   Wed 2.5 mg 2.5 mg 2.5 mg   Thu 2.5 mg 2.5 mg 2.5 mg   Fri 2.5 mg 2.5 mg 2.5 mg   Sat 2.5 mg 2.5 mg 2.5 mg   Visit Report - - -   Some recent data might be hidden       Plan:  1. INR is therapeutic today- see above in Anticoagulation Summary.    Tala DANIEL Bethea to continue their warfarin regimen- see above in Anticoagulation Summary.  2. Follow up in 2 weeks  3. Pt has agreed to only be called if INR out of range. They have been instructed to call if any changes in medications, doses, concerns, etc. Patient expresses understanding and has no further questions at this time.    Cleo Mccord

## 2019-07-28 DIAGNOSIS — I50.42 CHRONIC COMBINED SYSTOLIC AND DIASTOLIC CONGESTIVE HEART FAILURE (HCC): ICD-10-CM

## 2019-07-29 RX ORDER — SIMVASTATIN 10 MG
TABLET ORAL
Qty: 90 TABLET | Refills: 3 | Status: SHIPPED | OUTPATIENT
Start: 2019-07-29

## 2019-07-29 RX ORDER — FUROSEMIDE 40 MG/1
TABLET ORAL
Qty: 135 TABLET | Refills: 3 | Status: SHIPPED | OUTPATIENT
Start: 2019-07-29

## 2019-09-19 ENCOUNTER — TELEPHONE (OUTPATIENT)
Dept: PHARMACY | Facility: HOSPITAL | Age: 84
End: 2019-09-19

## 2019-09-23 ENCOUNTER — TELEPHONE (OUTPATIENT)
Dept: PHARMACY | Facility: HOSPITAL | Age: 84
End: 2019-09-23

## 2019-10-08 RX ORDER — OMEPRAZOLE 20 MG/1
CAPSULE, DELAYED RELEASE ORAL
Qty: 180 CAPSULE | Refills: 4 | Status: SHIPPED | OUTPATIENT
Start: 2019-10-08

## 2019-10-08 RX ORDER — METOPROLOL SUCCINATE 100 MG
TABLET, EXTENDED RELEASE 24 HR ORAL
Qty: 90 TABLET | Refills: 4 | Status: SHIPPED | OUTPATIENT
Start: 2019-10-08

## 2019-10-15 PROBLEM — H61.23 IMPACTED CERUMEN OF BOTH EARS: Status: ACTIVE | Noted: 2017-10-05

## 2019-10-15 PROBLEM — I25.10 CAD IN NATIVE ARTERY: Status: ACTIVE | Noted: 2018-12-13

## 2019-10-15 PROBLEM — R60.9 EDEMA: Status: ACTIVE | Noted: 2017-10-05

## 2019-10-15 PROBLEM — R53.83 FATIGUE: Status: ACTIVE | Noted: 2017-10-05

## 2019-10-15 PROBLEM — R73.9 HYPERGLYCEMIA: Status: ACTIVE | Noted: 2017-10-05

## 2019-10-15 PROBLEM — Z86.010 HISTORY OF COLONIC POLYPS: Status: ACTIVE | Noted: 2019-10-15

## 2019-10-15 PROBLEM — D12.6 ADENOMATOUS POLYP OF COLON: Status: ACTIVE | Noted: 2017-10-05

## 2019-10-15 PROBLEM — M25.569 ACUTE KNEE PAIN: Status: ACTIVE | Noted: 2018-12-14

## 2019-10-15 PROBLEM — R04.2 HEMOPTYSIS: Status: ACTIVE | Noted: 2017-10-05

## 2019-10-15 PROBLEM — J20.9 BRONCHITIS WITH BRONCHOSPASM: Status: ACTIVE | Noted: 2017-11-30

## 2019-10-15 PROBLEM — R60.0 LOCALIZED EDEMA: Status: ACTIVE | Noted: 2017-08-07

## 2019-10-15 PROBLEM — C09.9 CARCINOMA OF TONSIL (HCC): Status: ACTIVE | Noted: 2019-10-15

## 2019-10-15 PROBLEM — Z95.0 PRESENCE OF CARDIAC PACEMAKER: Status: ACTIVE | Noted: 2017-06-26

## 2019-10-15 PROBLEM — H69.80 EUSTACHIAN TUBE DYSFUNCTION: Status: ACTIVE | Noted: 2017-10-05

## 2019-10-15 PROBLEM — I95.9 HYPOTENSION: Status: ACTIVE | Noted: 2017-10-05

## 2019-10-15 PROBLEM — I10 HYPERTENSION: Status: ACTIVE | Noted: 2019-10-15

## 2019-10-15 PROBLEM — F32.A DEPRESSION: Status: ACTIVE | Noted: 2017-10-05

## 2019-10-15 PROBLEM — M51.36 DISC DEGENERATION, LUMBAR: Status: ACTIVE | Noted: 2017-10-05

## 2019-10-15 PROBLEM — I51.89 DIASTOLIC DYSFUNCTION: Status: ACTIVE | Noted: 2017-10-05

## 2019-10-15 PROBLEM — M15.9 OSTEOARTHROSIS, GENERALIZED, MULTIPLE JOINTS: Status: ACTIVE | Noted: 2017-10-05

## 2019-10-15 PROBLEM — F41.9 ANXIETY: Status: ACTIVE | Noted: 2017-10-05

## 2019-10-15 PROBLEM — L98.9 BENIGN SKIN LESION: Status: ACTIVE | Noted: 2017-10-05

## 2019-10-15 PROBLEM — K44.9 HIATAL HERNIA: Status: ACTIVE | Noted: 2017-10-05

## 2019-10-15 PROBLEM — D47.3 ESSENTIAL THROMBOCYTOSIS (HCC): Status: ACTIVE | Noted: 2017-10-05

## 2019-10-15 PROBLEM — D64.9 ANEMIA: Status: ACTIVE | Noted: 2017-10-05

## 2019-10-15 PROBLEM — R73.01 IMPAIRED FASTING GLUCOSE: Status: ACTIVE | Noted: 2017-10-05

## 2019-10-15 PROBLEM — M19.90 ARTHRITIS: Status: ACTIVE | Noted: 2017-10-05

## 2019-10-15 PROBLEM — M10.9 ACUTE GOUT OF FOOT: Status: ACTIVE | Noted: 2018-08-10

## 2019-10-15 PROBLEM — I50.42 CHRONIC COMBINED SYSTOLIC AND DIASTOLIC CONGESTIVE HEART FAILURE (HCC): Status: ACTIVE | Noted: 2017-08-07

## 2019-10-15 PROBLEM — Z79.01 LONG TERM CURRENT USE OF ANTICOAGULANT THERAPY: Status: ACTIVE | Noted: 2017-06-15

## 2019-10-15 PROBLEM — R73.03 PREDIABETES: Status: ACTIVE | Noted: 2019-03-05

## 2019-10-15 PROBLEM — K64.9 HEMORRHOIDS: Status: ACTIVE | Noted: 2017-10-05

## 2019-10-24 ENCOUNTER — ANTICOAGULATION VISIT (OUTPATIENT)
Dept: PHARMACY | Facility: HOSPITAL | Age: 84
End: 2019-10-24

## 2019-10-24 DIAGNOSIS — I48.19 PERSISTENT ATRIAL FIBRILLATION (HCC): ICD-10-CM

## 2019-10-24 NOTE — PROGRESS NOTES
This visit is for documentation purposes only: Patient is now following with the [facility]. No longer following in the [facility]. It has been a pleasure being part of her care.

## 2020-01-07 RX ORDER — POTASSIUM CHLORIDE 20 MEQ/1
TABLET, EXTENDED RELEASE ORAL
Qty: 30 TABLET | Refills: 12 | Status: SHIPPED | OUTPATIENT
Start: 2020-01-07 | End: 2020-01-07

## 2020-01-17 ENCOUNTER — CLINICAL SUPPORT NO REQUIREMENTS (OUTPATIENT)
Dept: CARDIOLOGY | Facility: CLINIC | Age: 85
End: 2020-01-17

## 2020-01-17 DIAGNOSIS — I49.5 SICK SINUS SYNDROME (HCC): Primary | ICD-10-CM
